# Patient Record
Sex: FEMALE | Race: BLACK OR AFRICAN AMERICAN | NOT HISPANIC OR LATINO | Employment: FULL TIME | URBAN - METROPOLITAN AREA
[De-identification: names, ages, dates, MRNs, and addresses within clinical notes are randomized per-mention and may not be internally consistent; named-entity substitution may affect disease eponyms.]

---

## 2017-01-17 ENCOUNTER — PATIENT MESSAGE (OUTPATIENT)
Dept: PODIATRY | Facility: CLINIC | Age: 37
End: 2017-01-17

## 2017-01-18 ENCOUNTER — TELEPHONE (OUTPATIENT)
Dept: PODIATRY | Facility: CLINIC | Age: 37
End: 2017-01-18

## 2017-01-18 ENCOUNTER — PATIENT MESSAGE (OUTPATIENT)
Dept: PODIATRY | Facility: CLINIC | Age: 37
End: 2017-01-18

## 2017-02-13 ENCOUNTER — PATIENT MESSAGE (OUTPATIENT)
Dept: INTERNAL MEDICINE | Facility: CLINIC | Age: 37
End: 2017-02-13

## 2017-02-13 DIAGNOSIS — Z82.49 FAMILY HISTORY OF CORONARY ARTERY DISEASE: Primary | ICD-10-CM

## 2017-02-15 ENCOUNTER — HOSPITAL ENCOUNTER (OUTPATIENT)
Dept: CARDIOLOGY | Facility: CLINIC | Age: 37
Discharge: HOME | End: 2017-02-15
Payer: COMMERCIAL

## 2017-02-15 ENCOUNTER — PATIENT MESSAGE (OUTPATIENT)
Dept: CARDIOLOGY | Facility: CLINIC | Age: 37
End: 2017-02-15

## 2017-02-15 ENCOUNTER — OFFICE VISIT (OUTPATIENT)
Dept: CARDIOLOGY | Facility: CLINIC | Age: 37
End: 2017-02-15
Payer: COMMERCIAL

## 2017-02-15 VITALS
HEIGHT: 64 IN | BODY MASS INDEX: 33.12 KG/M2 | WEIGHT: 194 LBS | HEART RATE: 79 BPM | DIASTOLIC BLOOD PRESSURE: 60 MMHG | SYSTOLIC BLOOD PRESSURE: 115 MMHG

## 2017-02-15 DIAGNOSIS — Z82.49 FAMILY HISTORY OF CORONARY ARTERY DISEASE: ICD-10-CM

## 2017-02-15 DIAGNOSIS — R07.9 CHEST PAIN AT REST: Primary | ICD-10-CM

## 2017-02-15 DIAGNOSIS — R07.9 CHEST PAIN AT REST: ICD-10-CM

## 2017-02-15 LAB — DIASTOLIC DYSFUNCTION: NO

## 2017-02-15 PROCEDURE — 99204 OFFICE O/P NEW MOD 45 MIN: CPT | Mod: S$GLB,,, | Performed by: INTERNAL MEDICINE

## 2017-02-15 PROCEDURE — 93015 CV STRESS TEST SUPVJ I&R: CPT | Mod: S$GLB,,, | Performed by: INTERNAL MEDICINE

## 2017-02-15 PROCEDURE — 99999 PR PBB SHADOW E&M-EST. PATIENT-LVL III: CPT | Mod: PBBFAC,,, | Performed by: INTERNAL MEDICINE

## 2017-02-15 RX ORDER — VITAMIN E 268 MG
400 CAPSULE ORAL DAILY
COMMUNITY

## 2017-02-15 NOTE — MR AVS SNAPSHOT
[facility]  [address]  [facility] [address]  Phone: [phone]                  Yanna Kwong   2/15/2017 10:30 AM   Office Visit    Description:  Female : 1980   Provider:  Andrea Vega MD   Department:  [facility]           Reason for Visit     Family history of coronary artery disease     Chest Pain           Diagnoses this Visit        Comments    Chest pain at rest    -  Primary     Family history of coronary artery disease                To Do List           Future Appointments        Provider Department Dept Phone    2/15/2017 1:30 PM TREADMILL, NUCLEAR [facility] [phone]      Goals (5 Years of Data)     None      [facility]     [facility] On Call Nurse Care Line -  Assistance  Registered nurses in the [facility] On Call Center provide clinical advisement, health education, appointment booking, and other advisory services.  Call for this free service at [phone].             Medications           Message regarding Medications     Verify the changes and/or additions to your medication regime listed below are the same as discussed with your clinician today.  If any of these changes or additions are incorrect, please notify your healthcare provider.             Verify that the below list of medications is an accurate representation of the medications you are currently taking.  If none reported, the list may be blank. If incorrect, please contact your healthcare provider. Carry this list with you in case of emergency.           Current Medications     ascorbic acid (VITAMIN C) 1000 MG tablet Take 1,000 mg by mouth once daily.    b complex vitamins capsule Take 1 capsule by mouth once daily.    BIOTIN ORAL Take 1 tablet by mouth once daily.    fish oil-omega-3 fatty acids 300-1,000 mg capsule Take 1 g by mouth once daily.    folic acid (FOLVITE) 400 MCG tablet Take 400 mcg by mouth once daily.    ketorolac (TORADOL) 10 mg  "tablet Take 1 tablet (10 mg total) by mouth every 8 (eight) hours. Use only 3 days per month    meloxicam (MOBIC) 15 MG tablet Take 1 tablet (15 mg total) by mouth once daily.    multivitamin (ONE DAILY MULTIVITAMIN) per tablet Take 1 tablet by mouth once daily.    vitamin E 400 UNIT capsule Take 400 Units by mouth once daily.           Clinical Reference Information           Your Vitals Were     BP Pulse Height Weight BMI    115/60 (BP Location: Left arm, Patient Position: Sitting, BP Method: Automatic) 79 5' 4" (1.626 m) 88 kg (194 lb 0.1 oz) 33.3 kg/m2      Blood Pressure          Most Recent Value    Right Arm BP - Sitting  120/59    Left Arm BP - Sitting  115/60    BP  115/60      Allergies as of 2/15/2017     Vicodin [Hydrocodone-acetaminophen]    Erythromycin    Ilosone      Immunizations Administered on Date of Encounter - 2/15/2017     None      Orders Placed During Today's Visit     Future Labs/Procedures Expected by Expires    Cardiac treadmill stress test  As directed 2/15/2018      Instructions    I strongly encourage you to adopt a Plant Based, whole food (unprocessed) dietary strategy.  I recommend that you watch the movie "[employer] Over [employer]" as well as visit www.[employer] for additional resources.  Also, I advise you to read the book "Prevent and Reverse Heart Disease" by Dr. Escamilla for specific instructions and recipes.        Language Assistance Services     ATTENTION: Language assistance services are available, free of charge. Please call [phone].      ATENCIÓN: Si habla keyla, tiene a jacobo disposición servicios gratuitos de asistencia lingüística. Llame al [phone].     CHÚ Ý: N?u b?n nói Ti?ng Vi?t, có các d?ch v? h? tr? ngôn ng? mi?n phí dành cho b?n. G?i s? [phone].         [facility] complies with applicable Federal civil rights laws and does not discriminate on the basis of race, color, national origin, age, disability, or sex.        "

## 2017-02-15 NOTE — PROGRESS NOTES
"Subjective:   Patient ID:  Yanna Kwong is a 36 y.o. female who presents for follow-up of Family history of coronary artery disease and Chest Pain (discomfort x 2 days ago)    HPI:  Pt is here with c/o of an episode of chest discomfort that occurred a few days ago after she woke up.  Has trouble articulating the character of pain however it occurred at rest and ultimately went away on its own.  Recent had her 1st cousin with SCD.  Post mortem "100% blockage of an artery".  Cousin likely had HTN and possibly had mantle radiation for hodgkins many years ago.  Her mother had HPL and CAD.  She has no known risk factors.  No tobacco.    Vitals:    02/15/17 1034   BP: 115/60   BP Location: Left arm   Patient Position: Sitting   BP Method: Automatic   Pulse: 79   Weight: 88 kg (194 lb 0.1 oz)   Height: 5' 4" (1.626 m)     Body mass index is 33.3 kg/(m^2).  CrCl cannot be calculated (Patient has no serum creatinine result on file.).    Lab Results   Component Value Date     07/16/2016    K 4.4 07/16/2016     07/16/2016    CO2 22 (L) 07/16/2016    BUN 8 07/16/2016    CREATININE 0.9 07/16/2016    GLU 93 07/16/2016    AST 21 07/16/2016    ALT 19 07/16/2016    ALBUMIN 3.8 07/16/2016    PROT 7.3 07/16/2016    BILITOT 0.7 07/16/2016    WBC 5.76 07/16/2016    HGB 12.4 07/16/2016    HCT 36.4 (L) 07/16/2016    MCV 87 07/16/2016     (H) 07/16/2016    TSH 0.776 07/16/2016    CHOL 190 07/16/2016    HDL 58 07/16/2016    LDLCALC 123.4 07/16/2016    TRIG 43 07/16/2016       Current Outpatient Prescriptions   Medication Sig    ascorbic acid (VITAMIN C) 1000 MG tablet Take 1,000 mg by mouth once daily.    b complex vitamins capsule Take 1 capsule by mouth once daily.    BIOTIN ORAL Take 1 tablet by mouth once daily.    fish oil-omega-3 fatty acids 300-1,000 mg capsule Take 1 g by mouth once daily.    folic acid (FOLVITE) 400 MCG tablet Take 400 mcg by mouth once daily.    ketorolac (TORADOL) 10 mg tablet " Take 1 tablet (10 mg total) by mouth every 8 (eight) hours. Use only 3 days per month    meloxicam (MOBIC) 15 MG tablet Take 1 tablet (15 mg total) by mouth once daily.    multivitamin (ONE DAILY MULTIVITAMIN) per tablet Take 1 tablet by mouth once daily.    vitamin E 400 UNIT capsule Take 400 Units by mouth once daily.     No current facility-administered medications for this visit.      Review of Systems   Constitution: Negative for malaise/fatigue.   Eyes: Negative for visual disturbance.   Cardiovascular: Positive for chest pain. Negative for claudication, dyspnea on exertion, irregular heartbeat, near-syncope, orthopnea and palpitations.   Respiratory: Negative for shortness of breath and sleep disturbances due to breathing.    Musculoskeletal: Negative for muscle cramps, muscle weakness and myalgias.   Gastrointestinal: Negative for abdominal pain and heartburn.   Genitourinary: Negative for nocturia.   Neurological: Negative for difficulty with concentration, excessive daytime sleepiness, light-headedness, loss of balance, paresthesias and vertigo.       Objective:   Physical Exam   Constitutional: She is oriented to person, place, and time. She appears well-developed and well-nourished.   HENT:   Head: Normocephalic and atraumatic.   Cardiovascular: Normal rate, regular rhythm and normal heart sounds.  Exam reveals no gallop and no friction rub.    No murmur heard.  Pulmonary/Chest: Effort normal and breath sounds normal.   Neurological: She is alert and oriented to person, place, and time.   Psychiatric: She has a normal mood and affect. Her behavior is normal. Judgment and thought content normal.       Assessment:     1. Chest pain at rest    2. Family history of coronary artery disease        Plan:   Pt is low risk for cardiac events.  Recent sudden death of her cousin likely contributing to her sxs.  Plan of ECG treadmill for assurances.    We had a lengthy discussion about nutrition and diet (>30  minutes). I have suggested a Plant Based, whole food, low fat diet. I have recommended literature and media that explain in detail how one can incorporate this type of lifestyle and diet.

## 2017-02-15 NOTE — PATIENT INSTRUCTIONS
"I strongly encourage you to adopt a Plant Based, whole food (unprocessed) dietary strategy.  I recommend that you watch the movie "[employer] [place]" as well as visit [email or web address] for additional resources.  Also, I advise you to read the book "Prevent and Reverse Heart Disease" by Dr. Escamilla for specific instructions and recipes.   "

## 2017-02-15 NOTE — LETTER
February 15, 2017      Lexie Chisholm MD  [address]  [place] [address]           [facility] Cardiology  [address]  [facility] [address]  Phone: [phone]          Patient: Yanna Kwong   MR Number: [id number]   YOB: 1980   Date of Visit: 2/15/2017       Dear Dr. Lexie Chisholm:    Thank you for referring Yanna Kwong to me for evaluation. Attached you will find relevant portions of my assessment and plan of care.    If you have questions, please do not hesitate to call me. I look forward to following Yanna Kwong along with you.    Sincerely,    Andrea Vega MD    Enclosure  CC:  No Recipients    If you would like to receive this communication electronically, please contact [email or web address] or [phone] to request more information on OtherInbox Link access.    For providers and/or their staff who would like to refer a patient to [facility], please contact us through our one-stop-shop provider referral line, [facility], at [phone].    If you feel you have received this communication in error or would no longer like to receive these types of communications, please e-mail [email or web address]

## 2017-06-12 RX ORDER — KETOROLAC TROMETHAMINE 10 MG/1
TABLET, FILM COATED ORAL
Qty: 27 TABLET | Refills: 0 | OUTPATIENT
Start: 2017-06-12

## 2017-06-13 NOTE — TELEPHONE ENCOUNTER
Last routine GYN exam 5/26/2016, pap WNL.  Annual scheduled 7/17/2017.      Please advise on refill request for Toradol to be sent to [facility] in [place]

## 2017-06-13 NOTE — TELEPHONE ENCOUNTER
----- Message from Megha Moe sent at 6/13/2017  2:46 PM CDT -----  Contact: self  Pt needs a refill on ketorolac (TORADOL) 10 mg tablet.  She uses [facility] in [place].    Pt does have an appointment scheduled but needs a refill before the scheduled appointment. Pt is currently cramping and would like to get it filled today if possible.     Pt can be reached at [phone]

## 2017-06-14 RX ORDER — KETOROLAC TROMETHAMINE 10 MG/1
10 TABLET, FILM COATED ORAL EVERY 8 HOURS
Qty: 18 TABLET | Refills: 0 | Status: SHIPPED | OUTPATIENT
Start: 2017-06-14 | End: 2017-09-05 | Stop reason: SDUPTHER

## 2017-06-15 ENCOUNTER — OFFICE VISIT (OUTPATIENT)
Dept: OPTOMETRY | Facility: CLINIC | Age: 37
End: 2017-06-15
Payer: COMMERCIAL

## 2017-06-15 DIAGNOSIS — H52.223 REGULAR ASTIGMATISM OF BOTH EYES: Primary | ICD-10-CM

## 2017-06-15 PROCEDURE — 92014 COMPRE OPH EXAM EST PT 1/>: CPT | Mod: S$GLB,,, | Performed by: OPTOMETRIST

## 2017-06-15 PROCEDURE — 99999 PR PBB SHADOW E&M-EST. PATIENT-LVL I: CPT | Mod: PBBFAC,,, | Performed by: OPTOMETRIST

## 2017-06-15 PROCEDURE — 92015 DETERMINE REFRACTIVE STATE: CPT | Mod: S$GLB,,, | Performed by: OPTOMETRIST

## 2017-06-15 NOTE — PROGRESS NOTES
HPI     Yanna Kwong is a 37 y.o. Female who returns  for continued eye care.   She reports that she has not noticed any changes in her vision since her   last visit. 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 Yanna   reports that her eyes feel dry sometimes she has blurry vision especially   in the afternoon.     (--)blurred vision  (--)Headaches  (--)diplopia  (--)flashes  (+)floaters  (--)pain  (--)Itching  (--)tearing  (--)burning  (--)Dryness  (--) OTC Drops  (--)Photophobia    Last edited by Sergio Soto, OD on 6/15/2017  4:38 PM. (History)        ROS     Positive for: Allergic/Imm (drug)    Negative for: Constitutional, Gastrointestinal, Neurological, Skin,   Genitourinary, Musculoskeletal, HENT, Endocrine, Cardiovascular, Eyes,   Respiratory, Psychiatric, Heme/Lymph    Last edited by Sergio Soto, OD on 6/15/2017  4:38 PM. (History)        Assessment /Plan     For exam results, see Encounter Report.    1. Regular astigmatism of both eyes  - Spec Rx per final Rx below for use prn  Glasses Prescription (4/23/2015)        Sphere Cylinder Axis    Right +0.50 +0.50 124    Left +0.75 +0.25 075    Type:  [employer]    Expiration Date:  4/23/2016        2. Good ocular Health OU      Patient education; RTC in 1 year, sooner prn

## 2017-06-15 NOTE — PROGRESS NOTES
Subjective:      Patient ID: Yanna Kwong is a 37 y.o. female.    Chief Complaint: No chief complaint on file.    HPI    ROS      Objective:        General: Yanna is well-developed, well-nourished, appears stated age, in no acute distress, alert and oriented to time, place and person.     Ortho/SPM Exam            Assessment:       No diagnosis found.       Plan:                Follow-up: No Follow-up on file. If there are any questions prior to this, the patient was instructed to contact the office.

## 2017-06-16 ENCOUNTER — HOSPITAL ENCOUNTER (OUTPATIENT)
Dept: RADIOLOGY | Facility: OTHER | Age: 37
Discharge: HOME | End: 2017-06-16
Attending: PHYSICAL MEDICINE & REHABILITATION
Payer: COMMERCIAL

## 2017-06-16 ENCOUNTER — OFFICE VISIT (OUTPATIENT)
Dept: SPINE | Facility: CLINIC | Age: 37
End: 2017-06-16
Attending: PHYSICAL MEDICINE & REHABILITATION
Payer: COMMERCIAL

## 2017-06-16 VITALS
WEIGHT: 199 LBS | HEART RATE: 75 BPM | HEIGHT: 64 IN | SYSTOLIC BLOOD PRESSURE: 121 MMHG | DIASTOLIC BLOOD PRESSURE: 59 MMHG | BODY MASS INDEX: 33.97 KG/M2

## 2017-06-16 DIAGNOSIS — G57.01 PIRIFORMIS SYNDROME, RIGHT: ICD-10-CM

## 2017-06-16 DIAGNOSIS — M54.41 CHRONIC RIGHT-SIDED LOW BACK PAIN WITH RIGHT-SIDED SCIATICA: Primary | ICD-10-CM

## 2017-06-16 DIAGNOSIS — G89.29 CHRONIC RIGHT-SIDED LOW BACK PAIN WITH RIGHT-SIDED SCIATICA: Primary | ICD-10-CM

## 2017-06-16 DIAGNOSIS — G89.29 CHRONIC RIGHT-SIDED LOW BACK PAIN WITH RIGHT-SIDED SCIATICA: ICD-10-CM

## 2017-06-16 DIAGNOSIS — M54.41 CHRONIC RIGHT-SIDED LOW BACK PAIN WITH RIGHT-SIDED SCIATICA: ICD-10-CM

## 2017-06-16 PROCEDURE — 99204 OFFICE O/P NEW MOD 45 MIN: CPT | Mod: S$GLB,,, | Performed by: PHYSICAL MEDICINE & REHABILITATION

## 2017-06-16 PROCEDURE — 72114 X-RAY EXAM L-S SPINE BENDING: CPT | Mod: 26,,, | Performed by: RADIOLOGY

## 2017-06-16 PROCEDURE — 99999 PR PBB SHADOW E&M-EST. PATIENT-LVL III: CPT | Mod: PBBFAC,,, | Performed by: PHYSICAL MEDICINE & REHABILITATION

## 2017-06-16 PROCEDURE — 72114 X-RAY EXAM L-S SPINE BENDING: CPT | Mod: TC

## 2017-06-16 RX ORDER — METHOCARBAMOL 500 MG/1
500-1000 TABLET, FILM COATED ORAL NIGHTLY PRN
Qty: 60 TABLET | Refills: 2 | Status: SHIPPED | OUTPATIENT
Start: 2017-06-16 | End: 2018-10-01

## 2017-06-16 RX ORDER — NAPROXEN SODIUM 550 MG/1
550 TABLET ORAL 2 TIMES DAILY WITH MEALS
Qty: 60 TABLET | Refills: 2 | Status: SHIPPED | OUTPATIENT
Start: 2017-06-16 | End: 2017-09-05

## 2017-06-16 NOTE — PROGRESS NOTES
Subjective:      Patient ID: Yanna Kwong is a 37 y.o. female.    Chief Complaint: Low-back Pain (right leg)    Ms Kwong is a 36 yo female here for evaluation of low back pain.  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  She completed healthy back with relief of her neck pain completing therapy 1/2015.  She has been having back pain off and on for the past 2 years.  She feels like she has had increased stiffness for 2-3 weeks.  She has been having some sharp pain on the right outer hip and the outer calf.  The pain is worse in the back.  She feels like the pain is worse with transition.  Getting in and out of chair and the bed. The pain is not constant.  The pain is worse with sitting for too long, standing for too long, or lying down.  She cannot lie on the right side.  The pain is achy stiffness, the buttock is a sharp shooting in hip and the calf.  There is some tingling in the calf.  Pain is 1/10 now, worst 6/10 getting up from sitting, best 0/10 lying down.  She has not taken anything for the pain.  She will have popping of the neck that comes and goes    Past Medical History:  No date: Thyroid nodule    Past Surgical History:  No date: BREAST BIOPSY  No date: WISDOM TOOTH EXTRACTION    Review of patient's family history indicates:    Heart disease                  Mother                    Cataracts                      Father                    Macular degeneration           Father                    Hypertension                   Paternal Grandfather      Prostate cancer                Paternal Grandfather      Diabetes                       Paternal Grandmother      Heart disease                  Paternal Grandmother      Heart attack                   Paternal Grandmother      Hypertension                   Maternal Grandmother      Hypertension                   Maternal Grandfather      Heart failure                  Maternal Grandfather      Heart  attack                   Maternal Grandfather      Colon cancer                   Maternal Uncle            Stroke                         Maternal Uncle            Atrial fibrillation            Maternal Aunt             Hypertension                   Maternal Aunt             Heart attack                   Cousin                    Amblyopia                      Neg Hx                    Blindness                      Neg Hx                    Glaucoma                       Neg Hx                    Retinal detachment             Neg Hx                    Strabismus                     Neg Hx                    Thyroid disease                Neg Hx                    Breast cancer                  Neg Hx                    Ovarian cancer                 Neg Hx                      Social History    Marital status: Single              Spouse name:                       Years of education:                 Number of children:               Occupational History  Occupation          Employer            Comment                                   [employer]     Social History Main Topics    Smoking status: Never Smoker                                                                Smokeless tobacco: Never Used                        Alcohol use: No              Drug use: No              Sexual activity: No                      Comment: Never sexually active    Social History Narrative    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    Graduated from [employer]    Degree in graphic Design from [employer]        Current Outpatient Prescriptions:  ascorbic acid (VITAMIN C) 1000 MG tablet, Take 1,000 mg by mouth once daily., Disp: , Rfl:   b complex vitamins capsule, Take 1 capsule by mouth once daily., Disp: , Rfl:   BIOTIN ORAL, Take 1 tablet by mouth once daily., Disp: , Rfl:   fish oil-omega-3 fatty acids 300-1,000 mg capsule, Take 1 g by mouth once daily., Disp: , Rfl:   folic acid (FOLVITE) 400 MCG  tablet, Take 400 mcg by mouth once daily., Disp: , Rfl:   ketorolac (TORADOL) 10 mg tablet, Take 1 tablet (10 mg total) by mouth every 8 (eight) hours. Use only 3 days per month, Disp: 18 tablet, Rfl: 0  multivitamin (ONE DAILY MULTIVITAMIN) per tablet, Take 1 tablet by mouth once daily., Disp: , Rfl:   vitamin E 400 UNIT capsule, Take 400 Units by mouth once daily., Disp: , Rfl:     No current facility-administered medications for this visit.       Review of patient's allergies indicates:   -- Vicodin (hydrocodone-acetaminophen) -- Other (See Comments)    --  tongue   -- Erythromycin -- Itching and Rash   -- Ilosone -- Rash          Review of Systems   Constitution: Negative for weight gain and weight loss.   Cardiovascular: Negative for chest pain.   Respiratory: Negative for shortness of breath.    Musculoskeletal: Positive for back pain, joint pain and neck pain (comes and goes). Negative for joint swelling.   Gastrointestinal: Negative for abdominal pain and bowel incontinence.   Genitourinary: Negative for bladder incontinence.   Neurological: Negative for numbness.         Objective:        General: Yanna is well-developed, well-nourished, appears stated age, in no acute distress, alert and oriented to time, place and person.     General    Vitals reviewed.  Constitutional: She is oriented to person, place, and time. She appears well-developed and well-nourished.   HENT:   Head: Normocephalic and atraumatic.   Pulmonary/Chest: Effort normal.   Neurological: She is alert and oriented to person, place, and time.   Psychiatric: She has a normal mood and affect. Her behavior is normal. Judgment and thought content normal.     General Musculoskeletal Exam   Gait: normal     Right Ankle/Foot Exam     Tests   Heel Walk: able to perform  Tiptoe Walk: able to perform    Left Ankle/Foot Exam     Tests   Heel Walk: able to perform  Tiptoe Walk: able to perform  Back (L-Spine & T-Spine) / Neck (C-Spine) Exam      Tenderness Right paramedian tenderness of the Sacrum.     Back (L-Spine & T-Spine) Range of Motion   Extension: 20   Flexion: 70   Lateral Bend Right: 20   Lateral Bend Left: 20   Rotation Right: 40   Rotation Left: 40     Spinal Sensation   Right Side Sensation  C-Spine Level: normal   L-Spine Level: normal  S-Spine Level: normal  Left Side Sensation  C-Spine Level: normal  L-Spine Level: normal  S-Spine Level: normal    Back (L-Spine & T-Spine) Tests   Right Side Tests  Straight leg raise:      Sitting SLR: > 70 degrees      Left Side Tests  Straight leg raise:     Sitting SLR: > 70 degrees          Other She has no scoliosis .  Spinal Kyphosis:  Absent      Muscle Strength   Right Upper Extremity   Biceps: 5/5/5   Deltoid:  5/5  Triceps:  5/5  Wrist Extension: 5/5/5   Finger Flexors:  5/5  Left Upper Extremity  Biceps: 5/5/5   Deltoid:  5/5  Triceps:  5/5  Wrist Extension: 5/5/5   Finger Flexors:  5/5  Right Lower Extremity   Hip Flexion: 5/5   Quadriceps:  5/5   Anterior tibial:  5/5/5  EHL:  5/5  Left Lower Extremity   Hip Flexion: 5/5   Quadriceps:  5/5   Anterior tibial:  5/5/5   EHL:  5/5    Reflexes     Left Side  Biceps:  2+  Triceps:  2+  Brachioradialis:  2+  Quadriceps:  2+  Achilles:  2+  Left Mcduffie's Sign:  Absent  Babinski Sign:  absent    Right Side   Biceps:  2+  Triceps:  2+  Brachioradialis:  2+  Quadriceps:  2+  Achilles:  2+  Right Mcduffie's Sign:  absent  Babinski Sign:  absent    Vascular Exam     Right Pulses        Carotid:                  2+    Left Pulses        Carotid:                  2+              Assessment:       1. Chronic right-sided low back pain with right-sided sciatica    2. Piriformis syndrome, right           Plan:       Orders Placed This Encounter    X-Ray Lumbar Complete With Flex And Ext    Ambulatory Referral to Physical/Occupational Therapy    naproxen sodium (ANAPROX) 550 MG tablet    methocarbamol (ROBAXIN) 500 MG Tab     More than 50% of the total time  of 45 minutes was spent in counseling on diagnosis and treatment options. We discussed back pain and the nature of back pain.  We discussed that it will likely improve and that it is not one thing that causes the pain but an accumulation of multiple things that we do.  We discussed posture sitting and the importance of trying to sit better.  We discussed the benefits of therapy and exercise and continuing to move.  She has gone through healthy back for her angel and it was very helpful.  She has not continued the strengthening but does do the exercises.  She still does have some neck pain and popping, we discussed posture in work chair  1.  PT for progressive resistance exercises pattern 1 lumbar, she has already done the employee benefit so will regular insurance  2.  X-ray of the lumbar spine  3.  Naproxen 550mg po BID  4.  We discussed continuing to work on posture for the neck and the retractions  5.  Robaxin 500-1000 at night  6.  rtc 3 months      Follow-up: Return in about 3 months (around 9/16/2017). If there are any questions prior to this, the patient was instructed to contact the office.

## 2017-07-13 ENCOUNTER — CLINICAL SUPPORT (OUTPATIENT)
Dept: REHABILITATION | Facility: OTHER | Age: 37
End: 2017-07-13
Attending: PHYSICAL MEDICINE & REHABILITATION
Payer: COMMERCIAL

## 2017-07-13 DIAGNOSIS — M51.36 DDD (DEGENERATIVE DISC DISEASE), LUMBAR: Primary | ICD-10-CM

## 2017-07-13 PROCEDURE — 97110 THERAPEUTIC EXERCISES: CPT

## 2017-07-13 PROCEDURE — 97161 PT EVAL LOW COMPLEX 20 MIN: CPT

## 2017-07-13 NOTE — PROGRESS NOTES
[facility] - PHYSICAL THERAPY EVALUATION       Pt has completed previous Employee benefit, charge as commercial insurance.    Name: Yanna Kwong  Clinic Number: [id number]    Yanna is a 37 y.o. female evaluated on 07/13/2017.   Time In: 4:35  Time out: 6:00    Diagnosis:   Encounter Diagnosis   Name Primary?    DDD (degenerative disc disease), lumbar Yes     Physician: Shirin Krishna, *  Treatment Orders: PT Eval and Treat    Past Medical History:   Diagnosis Date    Thyroid nodule      Current Outpatient Prescriptions   Medication Sig    ascorbic acid (VITAMIN C) 1000 MG tablet Take 1,000 mg by mouth once daily.    b complex vitamins capsule Take 1 capsule by mouth once daily.    BIOTIN ORAL Take 1 tablet by mouth once daily.    fish oil-omega-3 fatty acids 300-1,000 mg capsule Take 1 g by mouth once daily.    folic acid (FOLVITE) 400 MCG tablet Take 400 mcg by mouth once daily.    ketorolac (TORADOL) 10 mg tablet Take 1 tablet (10 mg total) by mouth every 8 (eight) hours. Use only 3 days per month    methocarbamol (ROBAXIN) 500 MG Tab Take 1-2 tablets (500-1,000 mg total) by mouth nightly as needed.    multivitamin (ONE DAILY MULTIVITAMIN) per tablet Take 1 tablet by mouth once daily.    naproxen sodium (ANAPROX) 550 MG tablet Take 1 tablet (550 mg total) by mouth 2 (two) times daily with meals.    vitamin E 400 UNIT capsule Take 400 Units by mouth once daily.     No current facility-administered medications for this visit.      Review of patient's allergies indicates:   Allergen Reactions    Vicodin [hydrocodone-acetaminophen] Other (See Comments)     tongue    Erythromycin Itching and Rash    Ilosone Rash     Precautions: Standard     Visit # authorized: 20  Authorization period: 12/31/17  Plan of care Expiration: Sent 7/16/17      HISTORY   History of Present Illness: Right sided low back pain. Started as low back stiffness and worsened over the last several weeks.  "Describes as shooting stabbing in right glut and right calf. 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 Last flare up has unclear onset.   Has been through [facility] for neck which improved symptoms overall.     Diagnostic Tests: From [facility] Unremarkable.     Pain Scale: Yanna rates pain on a scale of 0-10 to be 9 at worst; 0 currently; 0 at best using VAS.   Pain location: Low back, right glut    Aggravating factors: Prolonged Sitting in chair at work (5-10 minutes), transitioning from sit<>stand, prolonged standing (5-10), prolonged laying position in right side, getting in and out of car, bending forward (such as brushing teeth), twisting   Easing Factors: Walking, motion in general, ice, massage, stretching   Disturbed Sleep: Yes, but has pain with prolonged laying. Usually sleeps in sidelying without pillow. Pt educated on using pillow between legs while sleeping to improve pelvic alignment and potentially reduce morning symptoms      Pattern of pain questions:  1.  Where is your pain the worst? Low back   2.  Is your pain constant or intermittent? Intermittent   3.  Does bending forward make your typical pain worse? Yes  4.  Since the start of your back pain, has there been a change in your bowel or bladder?  No   5.  What can't you do now that you use to be able to do? Not limited, but when irritated leaning down, simly sitting down an rising.     Prior Treatment: No previous treatment for current symptoms  Prior functional status: Independent  DME owned/used: none  Occupation:  Patient coordinator  (Trains staff at main campus, light duty, sitting, occasionally going up and down stairs)    Leisure: Bowl, walking for exercise, Does "[employer]"                      Pts goals:  Reduce pain, learn how to manage your symptom     Red Flag Screening:   Cough  Sneeze  Strain: Yes,   Bladder/ bowel: No  Falls: No  General health: Ok  Night " pain: No  Unexplained weight loss: No     OBJECTIVE     POSTURE  Posture Alignment :slouched posture  Postural examination/scapula alignment: Rounded shoulder, Head forward and Lateral weight shift of hips  Joint integrity: Hypermobile as seen through spring testing  Skin integrity: WNL   Edema: Not significant   Sitting: Poor  Standing: Poor, moderate lordosis   Correction of posture: Added slimline roll, Improved posture in sitting.     SLS >10 sec, Left sided trendelenburg +     MOVEMENT LOSS    ROM Loss   Flexion Minimal loss, fair curve reversal   Extension minimal loss   Side bending Right minimal loss   Side bending Left minimal loss   Rotation Right minimal loss   Rotation Left minimal loss     Hip Flexiblity:   Supine Flexion: Mild limitation bilaterally  Supine Internal rotation: Mild limitation bilaterally, increase hip pain with left IR  Hamstrings 90/90: WFL  Prone Quadriceps: Moderate limitation bilaterally, Bilateral femeral tension postive  Prone Extension: Moderate limitation bilaterally     Lower Extremity Strength  Right LE  Left LE    Hip flexion: 4/5 Hip flexion: 4/5   Hip extension:  4/5 Hip extension: 4/5   Hip abduction: 4/5 Hip abduction: 5/5   Hip adduction:  5/5 Hip adduction:  5/5   Hip Internal rotation   4/5 Hip Internal rotation 4/5   Knee Flexion 5/5 Knee Flexion 5/5   Knee Extension 5/5 Knee Extension 5/5   Ankle dorsiflexion: 5/5 Ankle dorsiflexion: 5/5   Ankle plantarflexion: 5/5 Ankle plantarflexion: 5/5       GAIT:  Assistive Device used: None  Level of Assistance: Independent  Patient displays the following gait deviations: Not significant      Special Tests:   Test Name  Test Result   Prone Instability Test (--)   SI Joint Provocation Test (+), (+) spring, all other negative   Straight Leg Raise (--)   Neural Tension Test (+), slump and prone femoral stress test   Crossed Straight Leg Raise (--)   Walking on toes (--)   Walking on heels  (--)       NEUROLOGICAL SCREENING      Sensory deficit: LE intact to light touch    Reflexes:    Left Right   Patella Tendon 2+ 2+   Achilles Tendon 2+ +   Babinski NT NT   Clonus - -     REPEATED TEST MOVEMENTS:  Repeated Flexion in Standing no effect   Repeated Extension in Standing worse, slightly, improved ROM   Repeated Flexion in lying better    Repeated Extension in lying  worse       STATIC TESTS   Sitting slouched  no effect   Sitting erect better   Standing slouched no effect   Standing erect  better   Lying prone in extension  no effect   Long sitting   no effect       Baseline Isometric Testing on Med X equipment: Testing administered by PT    Baseline IM Testing Results:   Date of testin2017  ROM 48-0 deg   Max Peak Torque 82    Min Peak Torque 34    Flex/Ext Ratio 2.4   % below normative data -69       FOTO: Focus on Therapeutic Outcomes   Category: lumbar   % Impaired: FOTO Not completed at time of eval  Current Score  = CJ = at least 20% but < 40% impaired, limited or restricted  Goal at Discharge Score = CJ = at least 20% but < 40% impaired, limited or restricted    Score interpretation is as follows:     TEST SCORE  Modifier  Impairment Limitation Restriction    0/50  CH  0 % impaired, limited or restricted   1-9/50  CI  @ least 1% but less than 20% impaired, limited or restricted   10-19/50  CJ  @ least 20%<40% impaired, limited or restricted   20-29/50  CK  @ least 40%<60% impaired, limited or restricted   30-39/50  CL  @ least 60% <80% impaired, limited or restricted   40-49/50  CM  @ least 80%<100% impaired limited or restricted   50/50  CN  100% impaired, limited or restricted       Treatment   Time In: 5:20  Time Out: 6:00    PT Evaluation Completed? Yes  Discussed Plan of Care with patient: Yes      Written Home Exercises Provided:   Handouts were given to the patient. Pt demo good understanding of the education provided. Yanna demonstrated good return demonstration of activities.     - Patient received education  regarding proper posture and body mechanics.    - Martin roll tried, recommended, and purchase information was provided.  - Pt also informed of availability to meet with  for recommendations for stress management, diet, and developing positive habit changes.    - Patient received a handout regarding anticipated muscular soreness following the isometric test and strategies for management were reviewed with patient including stretching, using ice and scheduled rest.     HEP as follows     Sidelying clamshells 15x, 3-4x weekly   Flexion in lying 10x, 3x/day  Supine pelvic tilts 5-10 s/h 5x, 2x daily   Z-lie 10-20 min, 2x daily    Pt was instructed in and performed the following:   Cardiovascular exercise and therapeutic exercise to improve posture, lumbar/cervical ROM, strength, and muscular endurance as follows:     Yanna received therapeutic exercises to develop/improve posture, lumbar/cervical ROM, strength and muscular endurance for 30 minutes including the following exercises: med-x lumbar machine testing      Yanna received the following manual therapy techniques: None  Assessment   This is a 37 y.o. female referred to [facility] and presents with a medical diagnosis of   Encounter Diagnosis   Name Primary?    DDD (degenerative disc disease), lumbar Yes    and demonstrates limitations as described below in the problem list. Pt rehab potential is Good. Pt presents with lumbar dysfunction, decreased lumbar strength and ROM compared to norms, decreased LE ROM, decreased LE strength, decreased hip flexibility, poor postural alignment, poor body mechanics, positive neural tension and hypermobility of lumbar spine.         Pain Pattern: 1 PEN       Patient received education on the [facility] program, purpose of the isometric test, progression of back strengthening as well as wellness approach and systemic strengthening.  Details of the program were discussed.  Reviewed that patient  should feel support/pressure from med ex restraints but no pain or discomfort and patient expressed understanding.    Based on the above history and physical examination an active physical therapy program is recommended.  Pt will continue to benefit from skilled outpatient physical therapy to address the deficits listed below in the chart, provide pt/family education and to maximize pt's level of independence in the home and community environment. .     No environmental, cultural, spiritual, developmental or education needs expressed or noted    Medical necessity is demonstrated by the following problem list.    Pt presents with the following impairments:   History  Co-morbidities and personal factors that may impact the plan of care  Examination  Body Structures and Functions, activity limitations and participation restrictions that may impact the plan of care Clinical Presentation   Decision Making/ Complexity Score   Co-morbidities:   Standard        Personal Factors:   lifestyle  character  Body Regions:   back  lower extremities    Body Systems:   gross symmetry  ROM  strength  gross coordinated movement  transitions    Activity limitations:   Learning and applying knowledge  no deficits    General Tasks and Commands  no deficits    Communication  no deficits    Mobility  lifting and carrying objects  walking  driving (bike, car, motorcycle)  Standing    Self care  washing oneself (bathing, drying, washing hands)  caring for body parts (brushing teeth, shaving, grooming)  dressing    Domestic Life  doing house work (cleaning house, washing dishes, laundry)    Interactions/Relationships  no deficits    Life Areas  employment    Community and Social Life  community life  recreation and leisure    Participation Restrictions:   Increased pain with prolonged sitting needed for work. Also limited ability to perform exercise.    stable and uncomplicated   Low         GOALS: Pt is in agreement with the following  "goals.    Short term goals:  6 weeks or 10 visits   1.  Pt will demonstrate increased lumbar ROM by at least 3 degrees from the initial ROM value with improvements noted in functional ROM and ability to perform ADLs  2.  Pt will demonstrate increased maximum isometric torque value by 10% when compared to the initial value resulting in improved ability to perform bending, lifting, and carrying activities safely, confidently.  3.  Patient report a reduction in worst pain score by 1-2 points for improved tolerance during work and recreational activities  4.  Pt able to perform HEP correctly with minimal cueing or supervision for therapist    Long term goals: 13 weeks or 20 visits   1. Pt will demonstrate increased lumbar ROM by at least 6 degrees from initial ROM value, resulting in improved ability to perform functional fwd bending while standing and sitting.   2. Pt will demonstrate increased maximum isometric torque value by 20% when compared to the initial value resulting in improved ability to perform bending, lifting, and carrying activities safely, confidently.  3. Pt to demonstrate ability to independently control and reduce their pain through posture positioning and mechanical movements throughout a typical day.  4.  Patient will demonstrate improved overall function per FOTO Survey to CJ = at least 20% but < 40% impaired, limited or restricted score or less.      Plan   Outpatient physical therapy 2x week for 13 weeks or 20 visits to include the following:   - Patient education  - Therapeutic exercise  - Manual therapy  - Performance testing   - Neuromuscular Re-education  - Therapeutic activity   - Modalities    Pt may be seen by PTA as part of the rehabilitation team.     Therapist: Vidhya Mcconnell, PT  7/13/2017    "I certify the need for these services furnished under this plan of treatment and while under my care."    ____________________________________  Physician/Referring " Practitioner    _______________  Date of Signature

## 2017-07-16 NOTE — PLAN OF CARE
[facility] - PHYSICAL THERAPY EVALUATION     Name: Yanna Kwong  Clinic Number: [id number]    Yanna is a 37 y.o. female evaluated on 07/13/2017.   Time In: 4:35  Time out: 6:00    Diagnosis:   Encounter Diagnosis   Name Primary?    DDD (degenerative disc disease), lumbar Yes     Physician: Shirin Krishna, *  Treatment Orders: PT Eval and Treat    Past Medical History:   Diagnosis Date    Thyroid nodule      Current Outpatient Prescriptions   Medication Sig    ascorbic acid (VITAMIN C) 1000 MG tablet Take 1,000 mg by mouth once daily.    b complex vitamins capsule Take 1 capsule by mouth once daily.    BIOTIN ORAL Take 1 tablet by mouth once daily.    fish oil-omega-3 fatty acids 300-1,000 mg capsule Take 1 g by mouth once daily.    folic acid (FOLVITE) 400 MCG tablet Take 400 mcg by mouth once daily.    ketorolac (TORADOL) 10 mg tablet Take 1 tablet (10 mg total) by mouth every 8 (eight) hours. Use only 3 days per month    methocarbamol (ROBAXIN) 500 MG Tab Take 1-2 tablets (500-1,000 mg total) by mouth nightly as needed.    multivitamin (ONE DAILY MULTIVITAMIN) per tablet Take 1 tablet by mouth once daily.    naproxen sodium (ANAPROX) 550 MG tablet Take 1 tablet (550 mg total) by mouth 2 (two) times daily with meals.    vitamin E 400 UNIT capsule Take 400 Units by mouth once daily.     No current facility-administered medications for this visit.      Review of patient's allergies indicates:   Allergen Reactions    Vicodin [hydrocodone-acetaminophen] Other (See Comments)     tongue    Erythromycin Itching and Rash    Ilosone Rash     Precautions: Standard     Visit # authorized: 20  Authorization period: 12/31/17  Plan of care Expiration: Sent 7/16/17      HISTORY   History of Present Illness: Right sided low back pain. Started as low back stiffness and worsened over the last several weeks. Describes as shooting stabbing in right glut and right calf. 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 Last flare up has unclear onset.   Has been through [facility] for neck which improved symptoms overall.     Diagnostic Tests: From EPIC X-Ray Unremarkable.     Pain Scale: Yanna rates pain on a scale of 0-10 to be 9 at worst; 0 currently; 0 at best using VAS.   Pain location: Low back, right glut    Aggravating factors: Prolonged Sitting in chair at work (5-10 minutes), transitioning from sit<>stand, prolonged standing (5-10), prolonged laying position in right side, getting in and out of car, bending forward (such as brushing teeth), twisting   Easing Factors: Walking, motion in general, ice, massage, stretching   Disturbed Sleep: Yes, but has pain with prolonged laying. Usually sleeps in sidelying without pillow. Pt educated on using pillow between legs while sleeping to improve pelvic alignment and potentially reduce morning symptoms      Pattern of pain questions:  1.  Where is your pain the worst? Low back   2.  Is your pain constant or intermittent? Intermittent   3.  Does bending forward make your typical pain worse? Yes  4.  Since the start of your back pain, has there been a change in your bowel or bladder?  No   5.  What can't you do now that you use to be able to do? Not limited, but when irritated leaning down, simly sitting down an rising.     Prior Treatment: No previous treatment for current symptoms  Prior functional status: Independent  DME owned/used: none  Occupation:  Patient coordinator  (Trains staff at main campus, light duty, sitting, occasionally going up and down stairs)    Leisure: Bowl, walking for exercise, Does "[employer]"                      Pts goals:  Reduce pain, learn how to manage your symptom     Red Flag Screening:   Cough  Sneeze  Strain: Yes,   Bladder/ bowel: No  Falls: No  General health: Ok  Night pain: No  Unexplained weight loss: No     OBJECTIVE     POSTURE  Posture Alignment " :slouched posture  Postural examination/scapula alignment: Rounded shoulder, Head forward and Lateral weight shift of hips  Joint integrity: Hypermobile as seen through spring testing  Skin integrity: WNL   Edema: Not significant   Sitting: Poor  Standing: Poor, moderate lordosis   Correction of posture: Added slimline roll, Improved posture in sitting.     SLS >10 sec, Left sided trendelenburg +     MOVEMENT LOSS    ROM Loss   Flexion Minimal loss, fair curve reversal   Extension minimal loss   Side bending Right minimal loss   Side bending Left minimal loss   Rotation Right minimal loss   Rotation Left minimal loss     Hip Flexiblity:   Supine Flexion: Mild limitation bilaterally  Supine Internal rotation: Mild limitation bilaterally, increase hip pain with left IR  Hamstrings 90/90: WFL  Prone Quadriceps: Moderate limitation bilaterally, Bilateral femeral tension postive  Prone Extension: Moderate limitation bilaterally     Lower Extremity Strength  Right LE  Left LE    Hip flexion: 4/5 Hip flexion: 4/5   Hip extension:  4/5 Hip extension: 4/5   Hip abduction: 4/5 Hip abduction: 5/5   Hip adduction:  5/5 Hip adduction:  5/5   Hip Internal rotation   4/5 Hip Internal rotation 4/5   Knee Flexion 5/5 Knee Flexion 5/5   Knee Extension 5/5 Knee Extension 5/5   Ankle dorsiflexion: 5/5 Ankle dorsiflexion: 5/5   Ankle plantarflexion: 5/5 Ankle plantarflexion: 5/5       GAIT:  Assistive Device used: None  Level of Assistance: Independent  Patient displays the following gait deviations: Not significant      Special Tests:   Test Name  Test Result   Prone Instability Test (--)   SI Joint Provocation Test (+), (+) spring, all other negative   Straight Leg Raise (--)   Neural Tension Test (+), slump and prone femoral stress test   Crossed Straight Leg Raise (--)   Walking on toes (--)   Walking on heels  (--)       NEUROLOGICAL SCREENING     Sensory deficit: LE intact to light touch    Reflexes:    Left Right   Patella Tendon  2+ 2+   Achilles Tendon 2+ +   Babinski NT NT   Clonus - -     REPEATED TEST MOVEMENTS:  Repeated Flexion in Standing no effect   Repeated Extension in Standing worse, slightly, improved ROM   Repeated Flexion in lying better    Repeated Extension in lying  worse       STATIC TESTS   Sitting slouched  no effect   Sitting erect better   Standing slouched no effect   Standing erect  better   Lying prone in extension  no effect   Long sitting   no effect       Baseline Isometric Testing on Med X equipment: Testing administered by PT    Baseline IM Testing Results:   Date of testin2017  ROM 48-0 deg   Max Peak Torque 82    Min Peak Torque 34    Flex/Ext Ratio 2.4   % below normative data -69       FOTO: Focus on Therapeutic Outcomes   Category: lumbar   % Impaired: FOTO Not completed at time of eval  Current Score  = CJ = at least 20% but < 40% impaired, limited or restricted  Goal at Discharge Score = CJ = at least 20% but < 40% impaired, limited or restricted    Score interpretation is as follows:     TEST SCORE  Modifier  Impairment Limitation Restriction    0/50  CH  0 % impaired, limited or restricted   1-9/50  CI  @ least 1% but less than 20% impaired, limited or restricted   10-19/50  CJ  @ least 20%<40% impaired, limited or restricted   20-29/50  CK  @ least 40%<60% impaired, limited or restricted   30-39/50  CL  @ least 60% <80% impaired, limited or restricted   40-49/50  CM  @ least 80%<100% impaired limited or restricted   50/50  CN  100% impaired, limited or restricted       Treatment   Time In: 5:20  Time Out: 6:00    PT Evaluation Completed? Yes  Discussed Plan of Care with patient: Yes      Written Home Exercises Provided:   Handouts were given to the patient. Pt demo good understanding of the education provided. Yanna demonstrated good return demonstration of activities.     - Patient received education regarding proper posture and body mechanics.    - Martin fontenot tried, recommended, and  purchase information was provided.  - Pt also informed of availability to meet with  for recommendations for stress management, diet, and developing positive habit changes.    - Patient received a handout regarding anticipated muscular soreness following the isometric test and strategies for management were reviewed with patient including stretching, using ice and scheduled rest.     HEP as follows     Sidelying clamshells 15x, 3-4x weekly   Flexion in lying 10x, 3x/day  Supine pelvic tilts 5-10 s/h 5x, 2x daily   Z-lie 10-20 min, 2x daily    Pt was instructed in and performed the following:   Cardiovascular exercise and therapeutic exercise to improve posture, lumbar/cervical ROM, strength, and muscular endurance as follows:     Yanna received therapeutic exercises to develop/improve posture, lumbar/cervical ROM, strength and muscular endurance for 30 minutes including the following exercises: med-x lumbar machine testing      Yanna received the following manual therapy techniques: None  Assessment   This is a 37 y.o. female referred to [facility] and presents with a medical diagnosis of   Encounter Diagnosis   Name Primary?    DDD (degenerative disc disease), lumbar Yes    and demonstrates limitations as described below in the problem list. Pt rehab potential is Good. Pt presents with lumbar dysfunction, decreased lumbar strength and ROM compared to norms, decreased LE ROM, decreased LE strength, decreased hip flexibility, poor postural alignment, poor body mechanics, positive neural tension and hypermobility of lumbar spine.         Pain Pattern: 1 PEN       Patient received education on the [facility] program, purpose of the isometric test, progression of back strengthening as well as wellness approach and systemic strengthening.  Details of the program were discussed.  Reviewed that patient should feel support/pressure from med ex restraints but no pain or discomfort and patient  expressed understanding.    Based on the above history and physical examination an active physical therapy program is recommended.  Pt will continue to benefit from skilled outpatient physical therapy to address the deficits listed below in the chart, provide pt/family education and to maximize pt's level of independence in the home and community environment. .     No environmental, cultural, spiritual, developmental or education needs expressed or noted    Medical necessity is demonstrated by the following problem list.    Pt presents with the following impairments:   History  Co-morbidities and personal factors that may impact the plan of care  Examination  Body Structures and Functions, activity limitations and participation restrictions that may impact the plan of care Clinical Presentation   Decision Making/ Complexity Score   Co-morbidities:   Standard        Personal Factors:   lifestyle  character  Body Regions:   back  lower extremities    Body Systems:   gross symmetry  ROM  strength  gross coordinated movement  transitions    Activity limitations:   Learning and applying knowledge  no deficits    General Tasks and Commands  no deficits    Communication  no deficits    Mobility  lifting and carrying objects  walking  driving (bike, car, motorcycle)  Standing    Self care  washing oneself (bathing, drying, washing hands)  caring for body parts (brushing teeth, shaving, grooming)  dressing    Domestic Life  doing house work (cleaning house, washing dishes, laundry)    Interactions/Relationships  no deficits    Life Areas  employment    Community and Social Life  community life  recreation and leisure    Participation Restrictions:   Increased pain with prolonged sitting needed for work. Also limited ability to perform exercise.    stable and uncomplicated   Low         GOALS: Pt is in agreement with the following goals.    Short term goals:  6 weeks or 10 visits   1.  Pt will demonstrate increased lumbar ROM  "by at least 3 degrees from the initial ROM value with improvements noted in functional ROM and ability to perform ADLs  2.  Pt will demonstrate increased maximum isometric torque value by 10% when compared to the initial value resulting in improved ability to perform bending, lifting, and carrying activities safely, confidently.  3.  Patient report a reduction in worst pain score by 1-2 points for improved tolerance during work and recreational activities  4.  Pt able to perform HEP correctly with minimal cueing or supervision for therapist    Long term goals: 13 weeks or 20 visits   1. Pt will demonstrate increased lumbar ROM by at least 6 degrees from initial ROM value, resulting in improved ability to perform functional fwd bending while standing and sitting.   2. Pt will demonstrate increased maximum isometric torque value by 20% when compared to the initial value resulting in improved ability to perform bending, lifting, and carrying activities safely, confidently.  3. Pt to demonstrate ability to independently control and reduce their pain through posture positioning and mechanical movements throughout a typical day.  4.  Patient will demonstrate improved overall function per FOTO Survey to CJ = at least 20% but < 40% impaired, limited or restricted score or less.      Plan   Outpatient physical therapy 2x week for 13 weeks or 20 visits to include the following:   - Patient education  - Therapeutic exercise  - Manual therapy  - Performance testing   - Neuromuscular Re-education  - Therapeutic activity   - Modalities    Pt may be seen by PTA as part of the rehabilitation team.     Therapist: Vidhya Mcconnell, PT  7/13/2017    "I certify the need for these services furnished under this plan of treatment and while under my care."    ____________________________________  Physician/Referring Practitioner    _______________  Date of Signature                "

## 2017-07-17 ENCOUNTER — OFFICE VISIT (OUTPATIENT)
Dept: OBSTETRICS AND GYNECOLOGY | Facility: CLINIC | Age: 37
End: 2017-07-17
Payer: COMMERCIAL

## 2017-07-17 ENCOUNTER — OFFICE VISIT (OUTPATIENT)
Dept: PODIATRY | Facility: CLINIC | Age: 37
End: 2017-07-17
Payer: COMMERCIAL

## 2017-07-17 ENCOUNTER — OFFICE VISIT (OUTPATIENT)
Dept: ENDOCRINOLOGY | Facility: CLINIC | Age: 37
End: 2017-07-17
Payer: COMMERCIAL

## 2017-07-17 ENCOUNTER — LAB VISIT (OUTPATIENT)
Dept: LAB | Facility: HOSPITAL | Age: 37
End: 2017-07-17
Payer: COMMERCIAL

## 2017-07-17 VITALS
DIASTOLIC BLOOD PRESSURE: 62 MMHG | SYSTOLIC BLOOD PRESSURE: 100 MMHG | WEIGHT: 197.56 LBS | BODY MASS INDEX: 35 KG/M2 | HEIGHT: 63 IN

## 2017-07-17 VITALS
SYSTOLIC BLOOD PRESSURE: 100 MMHG | DIASTOLIC BLOOD PRESSURE: 65 MMHG | HEIGHT: 63 IN | BODY MASS INDEX: 34.55 KG/M2 | HEART RATE: 62 BPM | WEIGHT: 195 LBS

## 2017-07-17 VITALS
DIASTOLIC BLOOD PRESSURE: 79 MMHG | HEIGHT: 63 IN | BODY MASS INDEX: 35 KG/M2 | RESPIRATION RATE: 16 BRPM | WEIGHT: 197.56 LBS | HEART RATE: 69 BPM | SYSTOLIC BLOOD PRESSURE: 112 MMHG

## 2017-07-17 DIAGNOSIS — M79.672 FOOT PAIN, LEFT: ICD-10-CM

## 2017-07-17 DIAGNOSIS — M77.52 CAPSULITIS OF TOE OF LEFT FOOT: Primary | ICD-10-CM

## 2017-07-17 DIAGNOSIS — E66.9 OBESITY (BMI 30-39.9): ICD-10-CM

## 2017-07-17 DIAGNOSIS — Z12.4 SCREENING FOR MALIGNANT NEOPLASM OF THE CERVIX: ICD-10-CM

## 2017-07-17 DIAGNOSIS — Z01.419 ROUTINE GYNECOLOGICAL EXAMINATION: Primary | ICD-10-CM

## 2017-07-17 DIAGNOSIS — E04.1 THYROID NODULE: ICD-10-CM

## 2017-07-17 LAB
THYROPEROXIDASE IGG SERPL-ACNC: <6 IU/ML
TSH SERPL DL<=0.005 MIU/L-ACNC: 1.51 UIU/ML

## 2017-07-17 PROCEDURE — 83036 HEMOGLOBIN GLYCOSYLATED A1C: CPT

## 2017-07-17 PROCEDURE — 99395 PREV VISIT EST AGE 18-39: CPT | Mod: S$GLB,,, | Performed by: OBSTETRICS & GYNECOLOGY

## 2017-07-17 PROCEDURE — 36415 COLL VENOUS BLD VENIPUNCTURE: CPT

## 2017-07-17 PROCEDURE — 99213 OFFICE O/P EST LOW 20 MIN: CPT | Mod: S$GLB,,, | Performed by: PODIATRIST

## 2017-07-17 PROCEDURE — 99999 PR PBB SHADOW E&M-EST. PATIENT-LVL II: CPT | Mod: PBBFAC,,, | Performed by: OBSTETRICS & GYNECOLOGY

## 2017-07-17 PROCEDURE — 99204 OFFICE O/P NEW MOD 45 MIN: CPT | Mod: S$GLB,,, | Performed by: INTERNAL MEDICINE

## 2017-07-17 PROCEDURE — 99999 PR PBB SHADOW E&M-EST. PATIENT-LVL III: CPT | Mod: PBBFAC,,, | Performed by: PODIATRIST

## 2017-07-17 PROCEDURE — 84443 ASSAY THYROID STIM HORMONE: CPT

## 2017-07-17 PROCEDURE — 88175 CYTOPATH C/V AUTO FLUID REDO: CPT

## 2017-07-17 PROCEDURE — 86376 MICROSOMAL ANTIBODY EACH: CPT

## 2017-07-17 PROCEDURE — 99999 PR PBB SHADOW E&M-EST. PATIENT-LVL III: CPT | Mod: PBBFAC,,, | Performed by: INTERNAL MEDICINE

## 2017-07-17 RX ORDER — METHYLPREDNISOLONE 4 MG/1
4 TABLET ORAL DAILY
Qty: 1 TABLET | Refills: 0 | Status: SHIPPED | OUTPATIENT
Start: 2017-07-17 | End: 2017-08-10

## 2017-07-17 NOTE — PROGRESS NOTES
Chief Complaint: Thyroid Nodule      HPI:  Yanna Kwong is 37 y.o. female here today for evaluation of MNG     The patient initially was found to have thyroid nodules on thyroid ultrasound in 2014    Repeat study in 2015 showed a 4mm nodule on the right and a 5mm nodule on the left     No recent study available for comparison     She endorses occasional dysphagia   Denies shortness of breath in the recumbent position   Denies voice changes or hoarseness of voice     The patient has noticed an increase in neck size   She endorses thyroid tenderness     Denies neck pain or pressure     She denies family history of thyroid disease or thyroid cancer     Denies personal history of radiation exposure to her head, face or neck     Denies amenorrhea   States LMP : 7/07/2017   Not currently taking OCP's     She endorses fatigue, heat intolerance, hair loss and anxiety     Denies dry skin, constipation , palpitations or tremors     Review of Systems   Constitutional: Positive for fatigue.   HENT: Positive for trouble swallowing (occasional ). Negative for sore throat and voice change.    Eyes: Negative for visual disturbance.   Respiratory: Negative for shortness of breath.    Cardiovascular: Negative for chest pain and palpitations.   Gastrointestinal: Negative for abdominal pain, constipation, diarrhea, nausea and vomiting.   Endocrine: Positive for heat intolerance.   Genitourinary: Negative for menstrual problem.   Musculoskeletal: Negative for myalgias.   Skin: Negative for rash.        Hair loss   Allergic/Immunologic: Negative for immunocompromised state.   Neurological: Negative for tremors and headaches.   Hematological: Does not bruise/bleed easily.   Psychiatric/Behavioral: Negative for confusion. The patient is nervous/anxious.        Physical Exam   Constitutional: She is oriented to person, place, and time. She appears well-developed. No distress.   HENT:   Right Ear: External ear normal.   Left Ear:  External ear normal.   Nose: Nose normal.   Hearing normal  Dentition good    Neck: Normal range of motion. Neck supple. No tracheal deviation present. Thyromegaly present.   Cardiovascular: Normal rate and regular rhythm.    No murmur heard.  Pulmonary/Chest: Effort normal and breath sounds normal.   Abdominal: Soft. There is no tenderness. No hernia.   Musculoskeletal: She exhibits no edema or deformity.   Gait normal  No clubbing or cyanosis noted   Lymphadenopathy:     She has no cervical adenopathy.   Neurological: She is alert and oriented to person, place, and time. She displays normal reflexes. No cranial nerve deficit.   Skin: Skin is warm and dry. No rash noted.   No nodules       Psychiatric: She has a normal mood and affect. Judgment normal.   Nursing note and vitals reviewed.      Labs:  Lab Results   Component Value Date    TSH 0.776 07/16/2016        Assessment and Plan:  1. Thyroid nodule  - reviewed management options    - recommend repeat thyroid ultrasound study   - discussed indications for FNA: size, suspicious characteristics, compressive symptoms   - will determine if FNA biopsy is warranted, based on Thyroid USS results   -     TSH; Future; Expected date: 07/17/2017  -     US Soft Tissue Head Neck Thyroid; Future; Expected date: 07/17/2017  -     Thyroid peroxidase antibody; Future; Expected date: 07/17/2017    2. Obesity (BMI 30-39.9)  - encouraged dietary and lifestyle modifications   - recommend periodic A1c   - encouraged to resume exercise as tolerated   -     Hemoglobin A1c; Future; Expected date: 07/17/2017

## 2017-07-17 NOTE — PROGRESS NOTES
"OBSTETRIC HISTORY:   OB History      Para Term  AB Living    0              SAB TAB Ectopic Multiple Live Births                      COMPREHENSIVE GYN HISTORY:  PAP History: Denies abnormal Paps.  Infection History: Denies STDs. Denies PID.  Benign History: Denies uterine fibroids. Denies ovarian cysts. Denies endometriosis.   Cancer History: Denies cervical cancer. Denies uterine cancer or hyperplasia. Denies ovarian cancer. Denies vulvar cancer or pre-cancer. Denies vaginal cancer or pre-cancer. Denies breast cancer. Denies colon cancer.  Sexual Activity History:   reports that she does not engage in sexual activity.   Menstrual History: Every 21-23 days, flows for 5 days. Moderate to heavy flow.  Dysmenorrhea History: Reports severe dysmenorrhea. (takes Excedrin)  Contraception: None         HPI:   37 y.o.  Patient's last menstrual period was 2017.   Patient is  here for her annual gynecologic exam.  She has no complaints. She denies bladder, breast and bowel complaints.    ROS:  GENERAL: Denies weight gain or weight loss. Feeling well overall.   SKIN: Denies rash or lesions.   HEAD: Denies headache.   NODES: Denies enlarged lymph nodes.   CHEST: Denies shortness of breath.   ABDOMEN: No abdominal pain, constipation, diarrhea, nausea, vomiting or rectal bleeding.   URINARY: No frequency, dysuria, hematuria, or burning on urination.  REPRODUCTIVE: See HPI.   BREASTS: The patient denies pain, lumps, or nipple discharge.   HEMATOLOGIC: No easy bruisability.   MUSCULOSKELETAL: Denies joint pain or back pain.   NEUROLOGIC: Denies weakness.   PSYCHIATRIC: Denies depression, anxiety or mood swings.    PE:   /62   Ht 5' 3" (1.6 m)   Wt 89.6 kg (197 lb 8.5 oz)   LMP 2017   BMI 34.99 kg/m²   APPEARANCE: Well nourished, well developed, in no acute distress.  NECK: Neck symmetric without  thyromegaly.  NODES: No inguinal, cervical lymph node enlargement.  CHEST: Lungs clear to " auscultation.  HEART: Regular rate and rhythm, no murmurs, rubs or gallops.  ABDOMEN: Soft. No tenderness or masses. No hernias.  BREASTS: Symmetrical, no skin changes or visible lesions. No palpable masses, nipple discharge or adenopathy bilaterally.  PELVIC:   VULVA: No lesions. Normal female genitalia.  URETHRAL MEATUS: Normal size and location, no lesions, no prolapse.  URETHRA: No masses, tenderness, prolapse or scarring.  VAGINA: Moist and well rugated, no discharge, no significant cystocele or rectocele.  CERVIX: No lesions and discharge.  UTERUS: Normal size, regular shape, mobile, non-tender, bladder base nontender.  ADNEXA: No masses or tenderness.    PROCEDURES:  Pap smear  - brush only with speculum    Assessment:  Normal Gynecologic Exam    Plan:  Mammogram and Colonoscopy if indicated by current recommendations.  Return to clinic in one year or for any problems or complaints.    Counseling:  Patient was counseled today on A.C.S. Pap guidelines and recommendations for yearly pelvic exams and monthly self breast exams; to see her PCP for other health maintenance. Regular exercise and healthy diet.

## 2017-07-17 NOTE — PROGRESS NOTES
"Subjective:      Patient ID: Yanna Kwong is a 37 y.o. female.    Chief Complaint: Foot Pain (ball of rthe foot of the left foot)    Yanna is a 37 y.o. female who presents to the podiatry clinic  with complaint of  left foot pain. Onset of the symptoms was several weeks ago. Precipitating event: none known. Current symptoms include: ability to bear weight, but with some pain. Aggravating factors: any weight bearing. Symptoms have waxed and waned but are better overall. Patient has had no prior foot problems. Evaluation to date: none. Treatment to date: prescription NSAIDS which are somewhat effective.       Review of Systems   Constitution: Negative for chills, decreased appetite and fever.   Cardiovascular: Negative for leg swelling.   Musculoskeletal: Negative for arthritis, joint pain, joint swelling and myalgias.   Gastrointestinal: Negative for nausea and vomiting.   Neurological: Negative for loss of balance, numbness and paresthesias.           Objective:       Vitals:    07/17/17 1342   BP: 100/65   Pulse: 62   Weight: 88.5 kg (195 lb)   Height: 5' 3" (1.6 m)   PainSc:   2        Physical Exam   Constitutional: She is oriented to person, place, and time. She appears well-developed and well-nourished.   Cardiovascular:   Pulses:       Dorsalis pedis pulses are 2+ on the right side, and 2+ on the left side.        Posterior tibial pulses are 2+ on the right side, and 2+ on the left side.   Musculoskeletal: She exhibits no edema or tenderness.        Right ankle: Normal.        Left ankle: Normal.        Right foot: There is no swelling, no crepitus and no deformity.        Left foot: There is no swelling, no crepitus and no deformity.   Adequate joint range of motion without pain, limitation, nor crepitation Bilateral feet and ankle joints. Muscle strength is 5/5 in all groups bilaterally.      Tenderness on palpation of the L 2-3 metatarsal phalangeal joint. Mild tenderness with palpation of the " adjacent intermetatarsal space with a negative diane's click      Lymphadenopathy:   No palpable lymph nodes   Neurological: She is alert and oriented to person, place, and time. She has normal strength.   Skin: Skin is warm, dry and intact. No rash noted. No erythema. Nails show no clubbing.   Psychiatric: She has a normal mood and affect. Her behavior is normal.             Assessment:       Encounter Diagnoses   Name Primary?    Capsulitis of toe of left foot Yes    Foot pain, left          Plan:       Yanna was seen today for foot pain.    Diagnoses and all orders for this visit:    Capsulitis of toe of left foot    Foot pain, left    Other orders  -     methylPREDNISolone (MEDROL DOSEPACK) 4 mg tablet; Take 1 tablet (4 mg total) by mouth once daily. Use as instructed on dose pack      I counseled the patient on her conditions, their implications and medical management.    Patient instructed on adequate icing techniques. Patient should ice the affected area at least once per day x 10 minutes for 10 days . I advised the  patient that extra icing would also be beneficial to ensure adequate anti inflammatory effect     Medrol dose [place] prescribed, advised patient to take meds as directed. Patient should complete medication. If problems occur notify the clinic

## 2017-07-18 LAB
ESTIMATED AVG GLUCOSE: 100 MG/DL
HBA1C MFR BLD HPLC: 5.1 %

## 2017-07-20 ENCOUNTER — HOSPITAL ENCOUNTER (OUTPATIENT)
Dept: ENDOCRINOLOGY | Facility: CLINIC | Age: 37
Discharge: HOME | End: 2017-07-20
Attending: INTERNAL MEDICINE
Payer: COMMERCIAL

## 2017-07-20 DIAGNOSIS — E04.1 THYROID NODULE: ICD-10-CM

## 2017-07-20 PROCEDURE — 76536 US EXAM OF HEAD AND NECK: CPT | Mod: S$GLB,,, | Performed by: INTERNAL MEDICINE

## 2017-08-10 ENCOUNTER — PATIENT MESSAGE (OUTPATIENT)
Dept: INTERNAL MEDICINE | Facility: CLINIC | Age: 37
End: 2017-08-10

## 2017-08-10 ENCOUNTER — LAB VISIT (OUTPATIENT)
Dept: LAB | Facility: HOSPITAL | Age: 37
End: 2017-08-10
Attending: INTERNAL MEDICINE
Payer: COMMERCIAL

## 2017-08-10 ENCOUNTER — OFFICE VISIT (OUTPATIENT)
Dept: INTERNAL MEDICINE | Facility: CLINIC | Age: 37
End: 2017-08-10
Payer: COMMERCIAL

## 2017-08-10 VITALS
BODY MASS INDEX: 33.27 KG/M2 | SYSTOLIC BLOOD PRESSURE: 104 MMHG | WEIGHT: 194.88 LBS | HEART RATE: 78 BPM | DIASTOLIC BLOOD PRESSURE: 78 MMHG | HEIGHT: 64 IN

## 2017-08-10 DIAGNOSIS — E66.9 OBESITY, UNSPECIFIED OBESITY SEVERITY, UNSPECIFIED OBESITY TYPE: ICD-10-CM

## 2017-08-10 DIAGNOSIS — Z00.00 ROUTINE GENERAL MEDICAL EXAMINATION AT A HEALTH CARE FACILITY: Primary | ICD-10-CM

## 2017-08-10 DIAGNOSIS — Z00.00 ROUTINE GENERAL MEDICAL EXAMINATION AT A HEALTH CARE FACILITY: ICD-10-CM

## 2017-08-10 LAB
ALBUMIN SERPL BCP-MCNC: 3.7 G/DL
ALP SERPL-CCNC: 45 U/L
ALT SERPL W/O P-5'-P-CCNC: 17 U/L
ANION GAP SERPL CALC-SCNC: 8 MMOL/L
AST SERPL-CCNC: 19 U/L
BASOPHILS # BLD AUTO: 0.04 K/UL
BASOPHILS NFR BLD: 0.7 %
BILIRUB SERPL-MCNC: 0.6 MG/DL
BUN SERPL-MCNC: 10 MG/DL
CALCIUM SERPL-MCNC: 9.2 MG/DL
CHLORIDE SERPL-SCNC: 106 MMOL/L
CHOLEST/HDLC SERPL: 3.3 {RATIO}
CO2 SERPL-SCNC: 26 MMOL/L
CREAT SERPL-MCNC: 0.9 MG/DL
DIFFERENTIAL METHOD: ABNORMAL
EOSINOPHIL # BLD AUTO: 0 K/UL
EOSINOPHIL NFR BLD: 0.7 %
ERYTHROCYTE [DISTWIDTH] IN BLOOD BY AUTOMATED COUNT: 13.6 %
EST. GFR  (AFRICAN AMERICAN): >60 ML/MIN/1.73 M^2
EST. GFR  (NON AFRICAN AMERICAN): >60 ML/MIN/1.73 M^2
GLUCOSE SERPL-MCNC: 87 MG/DL
HCT VFR BLD AUTO: 35.8 %
HDL/CHOLESTEROL RATIO: 29.9 %
HDLC SERPL-MCNC: 174 MG/DL
HDLC SERPL-MCNC: 52 MG/DL
HGB BLD-MCNC: 12.3 G/DL
LDLC SERPL CALC-MCNC: 99 MG/DL
LYMPHOCYTES # BLD AUTO: 1.9 K/UL
LYMPHOCYTES NFR BLD: 32.6 %
MCH RBC QN AUTO: 29.5 PG
MCHC RBC AUTO-ENTMCNC: 34.4 G/DL
MCV RBC AUTO: 86 FL
MONOCYTES # BLD AUTO: 0.3 K/UL
MONOCYTES NFR BLD: 4.5 %
NEUTROPHILS # BLD AUTO: 3.7 K/UL
NEUTROPHILS NFR BLD: 61.5 %
NONHDLC SERPL-MCNC: 122 MG/DL
PLATELET # BLD AUTO: 337 K/UL
PMV BLD AUTO: 10.3 FL
POTASSIUM SERPL-SCNC: 4.3 MMOL/L
PROT SERPL-MCNC: 7.3 G/DL
RBC # BLD AUTO: 4.17 M/UL
SODIUM SERPL-SCNC: 140 MMOL/L
TRIGL SERPL-MCNC: 115 MG/DL
WBC # BLD AUTO: 5.95 K/UL

## 2017-08-10 PROCEDURE — 80053 COMPREHEN METABOLIC PANEL: CPT

## 2017-08-10 PROCEDURE — 85025 COMPLETE CBC W/AUTO DIFF WBC: CPT

## 2017-08-10 PROCEDURE — 99999 PR PBB SHADOW E&M-EST. PATIENT-LVL III: CPT | Mod: PBBFAC,,, | Performed by: INTERNAL MEDICINE

## 2017-08-10 PROCEDURE — 80061 LIPID PANEL: CPT

## 2017-08-10 PROCEDURE — 99395 PREV VISIT EST AGE 18-39: CPT | Mod: S$GLB,,, | Performed by: INTERNAL MEDICINE

## 2017-08-10 PROCEDURE — 36415 COLL VENOUS BLD VENIPUNCTURE: CPT

## 2017-08-10 NOTE — PROGRESS NOTES
"Chief Complaint: Annual exam     HPI: this is a 37 year old woman who presents for an annual exam. She generally feels well.     She had a benign breast biopsy 6/2016 on the right the breast. Repeat MMG 12/16 fine    Sinus congestion comes and goes. NO fever, chills, ear pain, cough, shortness of breath, nausea, vomiting, constipation, diarreha. She takes Fioricet as needed for severe headache which helps.     SHe is in the healthy back program and her back is better.              Past Medical History     Diagnosis   Date        Thyroid nodule                  Past Surgical History     Procedure   Date        [place] tooth extraction          Meds and allergies: updated on EPIC     Social History and Family History:updated on epic     Review of Systems:  General: No fever, chills, night sweats, weight loss, fatigue  HEENT: No visual changes, diplopia, hearing loss, sinus congestion, sore throat, post nasal drip  Resp: No cough, shortness of breath, dyspnea on exertion  Cardiovascular: No chest pain, palpitations, orthopnea, PND, edema  GI: No nausea, vomiting, constipation, diarrhea, melana, bloody stools, heartburn  : No dysuria, hematuria, incontinence, incomplete void, dribbling, nocturia.  Musculoskeletal: No joint pain, muscle pain  Neuro: No headaches, seizures, numbness  Endocrine: No polydipsia, polyuria, hot or cold intolerance.  Heme: No anemia, easy bruising.  Skin: No rashes, hairloss.  Psych: No anxiety, depression, or insomnia  Breasts: No abnormalities  GYN: Menstrual cramping is better with toradol. Lasts 5 days. Once a month     Physical exam:   /78 (BP Location: Right arm, Patient Position: Sitting, BP Method: Large (Manual))   Pulse 78   Ht 5' 4" (1.626 m)   Wt 88.4 kg (194 lb 14.2 oz)   LMP 07/07/2017   BMI 33.45 kg/m²     General: alert, oriented x 3, no apparent distress. Affect normal  HEENT: Conjunctivae: anicteric, PERRL, EOMI, TM red fluid, Oralpharynx clear  Neck: supple, no " thyroid enlargement, no cervical lymphadenopathy  Resp: effort normal, lungs clear bilaterally  CV: Regular rate and rhythm without murmurs, gallops or rubs, no lower extremity edema  ABDOMEN: soft, non distended, non tender, bowel sounds present, no hepatosplenomgaly       GYN 5/16  Eye exam - 4/2015     Assessment:/plan  Annual exam  Migraine - continue Fioricet as needed  Thyroid nodule - ultrasound in 2017 unrmarkable  Continue healthy back  Fasting labs   Safety and diet discussed  MEdical fitness referral  Return in one year for physical, sooner if issues.     Answers for HPI/ROS submitted by the patient on 8/7/2017   activity change: No  unexpected weight change: No  neck pain: No  hearing loss: No  rhinorrhea: No  trouble swallowing: No  eye discharge: No  visual disturbance: No  chest tightness: No  wheezing: No  chest pain: No  palpitations: No  blood in stool: No  constipation: No  vomiting: No  diarrhea: No  polydipsia: No  polyuria: No  difficulty urinating: No  hematuria: No  menstrual problem: No  dysuria: No  joint swelling: No  arthralgias: No  headaches: No  weakness: No  confusion: No  dysphoric mood: No

## 2017-08-23 ENCOUNTER — CLINICAL SUPPORT (OUTPATIENT)
Dept: REHABILITATION | Facility: OTHER | Age: 37
End: 2017-08-23
Attending: PHYSICAL MEDICINE & REHABILITATION
Payer: COMMERCIAL

## 2017-08-23 DIAGNOSIS — M51.36 DDD (DEGENERATIVE DISC DISEASE), LUMBAR: Primary | ICD-10-CM

## 2017-08-23 PROCEDURE — 97110 THERAPEUTIC EXERCISES: CPT

## 2017-08-23 NOTE — PROGRESS NOTES
"[facility]      Name: Yanna Kwong  Clinic Number: [id number]  Date of Treatment: 2017   Diagnosis:   Encounter Diagnosis   Name Primary?    DDD (degenerative disc disease), lumbar Yes     Physician: Shirin Krishna, *    Pain pattern determined: 1 PEN  Plan of care signed: 2017   Time in: 5:23 (patient late)  Time Out: 6:00  Total Treatment time: 28  Precautions: Standard  Visit #: 2    POC due:10/14/2017  Reassessment due:2017, done 2017    Subjective   Yanna reports improvement of symptoms.  She feels much better than she did last visit.  She has not been doing her exercises regularly but states that she is trying to do them more often.     Patient reports their pain to be 1/10 on a 0-10 scale with 0 being no pain and 10 being the worst pain imaginable.    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, light duty, sitting, occasionally going up and down stairs)    Leisure: Bowl, walking for exercise, Does "[employer]"                      Pts goals:  Reduce pain, learn how to manage your symptom     Objective     Baseline IM Testing Results:   Date of testin2017  ROM 48-0 deg   Max Peak Torque 82    Min Peak Torque 34    Flex/Ext Ratio 2.4   % below normative data -69      OUTCOMES:   From Initial Evaluation  FOTO: Focus on Therapeutic Outcomes   Category: lumbar   % Impaired: FOTO Not completed at time of eval  Current Score  = CJ = at least 20% but < 40% impaired, limited or restricted  Goal at Discharge Score = CJ = at least 20% but < 40% impaired, limited or restricted    Score interpretation is as follows:    TEST SCORE  Modifier  Impairment Limitation Restriction    0/50  CH  0 % impaired, limited or restricted   1-  CI  @ least 1% but less than 20% impaired, limited or restricted   10-  CJ  @ least 20%<40% impaired, limited or restricted "   20-29/50  CK  @ least 40%<60% impaired, limited or restricted   30-39/50  CL  @ least 60% <80% impaired, limited or restricted   40-49/50  CM  @ least 80%<100% impaired limited or restricted   50/50  CN  100% impaired, limited or restricted       Treatment    Pt was instructed in and performed the following:     Yanna received therapeutic exercises to develop/improved posture, cardiovascular endurance, muscular endurance, lumbar/cervical ROM, strength and muscular endurance for 28 minutes including the following exercises:     [facility] 8/23/2017   Visit Number 2   VAS Pain Rating 1   Flexion in Lying 10   Lumbar Weight 41   Repetitions 15   Rating of Perceived Exertion 3   Ice - Z Lie (in min.) 10       Peripheral muscle strengthening which included 1 set of 15-20 repetitions at a slow, controlled 7 second per rep pace focused on strengthening supporting musculature for improved body mechanics and functional mobility.  Pt and therapist focused on proper form during treatment to ensure optimal strengthening of each targeted muscle group.  Machines were utilized including torso rotation, leg extension, leg curl, chest press, upright row. Tricep extension, bicep curl, leg press, and hip abduction added on third visit.     Home Exercise Program as follows:   Sidelying clamshells 15x, 3-4x weekly   Flexion in lying 10x, 3x/day  Supine pelvic tilts 5-10 s/h 5x, 2x daily   Z-lie 10-20 min, 2x daily  Modified Piriformis stretch 10s/h 3x/day  Handouts were given to the patient. Pt demo good understanding of the education provided. Yanna demonstrated good return demonstration of activities.     Lumbar roll use compliance: no  Additional exercises taught this treatment session: Modified Piriformis stretch     Assessment     Patient tolerated tx well with no increases in symptoms throughout.  Patient was able to tolerate new weight on lumbar med-x machine for 15 reps with an RPE of 3/10.  Review HEP with  patient.  Patient can perform same weight next visit depending on pain level.  Patient was able to perform exercises with moderate vc's for recall.  Patient performed all peripheral resistance machines with no increases in symptoms.  Patient finished with ice in z-lie position.  Patient reported no symptoms upon leaving.     Patient is making good progress towards established goals.  Pt will continue to benefit from skilled outpatient physical therapy to address the deficits stated in the impairment chart, provide pt/family education and to maximize pt's level of independence in the home and community environment.       Pt's spiritual, cultural and educational needs considered and pt agreeable to plan of care and goals as stated below:     Medical necessity is demonstrated by the following IMPAIRMENTS/PROBLEMS:    Pt presents with the following impairments:   History  Co-morbidities and personal factors that may impact the plan of care   Examination  Body Structures and Functions, activity limitations and participation restrictions that may impact the plan of care Clinical Presentation Decision Making/ Complexity Score   Co-morbidities:   Standard           Personal Factors:   lifestyle  character   Body Regions:   back  lower extremities     Body Systems:   gross symmetry  ROM  strength  gross coordinated movement  transitions     Activity limitations:   Learning and applying knowledge  no deficits     General Tasks and Commands  no deficits     Communication  no deficits     Mobility  lifting and carrying objects  walking  driving (bike, car, motorcycle)  Standing     Self care  washing oneself (bathing, drying, washing hands)  caring for body parts (brushing teeth, shaving, grooming)  dressing     Domestic Life  doing house work (cleaning house, washing dishes, laundry)     Interactions/Relationships  no deficits     Life Areas  employment     Community and Social Life  community life  recreation and  leisure     Participation Restrictions:   Increased pain with prolonged sitting needed for work. Also limited ability to perform exercise.     stable and uncomplicated    Low      Goals:   Short term goals:  6 weeks or 10 visits   1.  Pt will demonstrate increased lumbar ROM by at least 3 degrees from the initial ROM value with improvements noted in functional ROM and ability to perform ADLs. (progressing, not met)  2.  Pt will demonstrate increased maximum isometric torque value by 10% when compared to the initial value resulting in improved ability to perform bending, lifting, and carrying activities safely, confidently.  (progressing, not met)  3.  Patient report a reduction in worst pain score by 1-2 points for improved tolerance during work and recreational activities.  (progressing, not met)  4.  Pt able to perform HEP correctly with minimal cueing or supervision for therapist.  (progressing, not met)     Long term goals: 13 weeks or 20 visits   1. Pt will demonstrate increased lumbar ROM by at least 6 degrees from initial ROM value, resulting in improved ability to perform functional fwd bending while standing and sitting.  (progressing, not met)  2. Pt will demonstrate increased maximum isometric torque value by 20% when compared to the initial value resulting in improved ability to perform bending, lifting, and carrying activities safely, confidently.  (progressing, not met)  3. Pt to demonstrate ability to independently control and reduce their pain through posture positioning and mechanical movements throughout a typical day.  (progressing, not met)  4.  Patient will demonstrate improved overall function per FOTO Survey to CJ = at least 20% but < 40% impaired, limited or restricted score or less.  (progressing, not met)         Plan   Continue with established Plan of Care towards established PT goals.

## 2017-08-29 ENCOUNTER — CLINICAL SUPPORT (OUTPATIENT)
Dept: REHABILITATION | Facility: OTHER | Age: 37
End: 2017-08-29
Attending: PHYSICAL MEDICINE & REHABILITATION
Payer: COMMERCIAL

## 2017-08-29 DIAGNOSIS — M54.41 CHRONIC RIGHT-SIDED LOW BACK PAIN WITH RIGHT-SIDED SCIATICA: ICD-10-CM

## 2017-08-29 DIAGNOSIS — G89.29 CHRONIC RIGHT-SIDED LOW BACK PAIN WITH RIGHT-SIDED SCIATICA: ICD-10-CM

## 2017-08-29 PROBLEM — M54.9 BACK PAIN: Status: ACTIVE | Noted: 2017-08-29

## 2017-08-29 PROCEDURE — 97110 THERAPEUTIC EXERCISES: CPT

## 2017-08-29 NOTE — PROGRESS NOTES
"[facility] Treatment      Name: Yanna Kwong  Clinic Number: [id number]  Date of Treatment: 2017   Diagnosis:   Encounter Diagnosis   Name Primary?    Chronic right-sided low back pain with right-sided sciatica      Physician: Shirin Krishna, *    Pain pattern determined: 1 PEN  Plan of care signed: 2017   Time in: 5:00  Time Out: 6:00  Total Treatment time: 45  Precautions: Standard  Visit #: 3    POC due:10/14/2017  Reassessment due:2017, done 2017  Next reassessment due: 17    Pt has completed previous PT with [facility] and is now charged as commercial insurance.       Subjective   Yanna reports improvement of symptoms.  She feels much better than she did last visit.  She has not been doing her exercises regularly but states that she is trying to do them more often. She notes mild increase in "twinging" of low back after last session. She doesn't report any significant change in symptoms throughout session.     Patient reports their pain to be 1/10 on a 0-10 scale with 0 being no pain and 10 being the worst pain imaginable.    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, light duty, sitting, occasionally going up and down stairs)    Leisure: Bowl, walking for exercise, Does "[employer]"                      Pts goals:  Reduce pain, learn how to manage your symptom     Objective     Baseline IM Testing Results:   Date of testin2017  ROM 48-0 deg   Max Peak Torque 82    Min Peak Torque 34    Flex/Ext Ratio 2.4   % below normative data -69      OUTCOMES:   From Initial Evaluation  FOTO: Focus on Therapeutic Outcomes   Category: lumbar   % Impaired: FOTO Not completed at time of eval  Current Score  = CJ = at least 20% but < 40% impaired, limited or restricted  Goal at Discharge Score = CJ = at least 20% but < 40% impaired, limited or restricted    Score " interpretation is as follows:    TEST SCORE  Modifier  Impairment Limitation Restriction    0/50  CH  0 % impaired, limited or restricted   1-9/50  CI  @ least 1% but less than 20% impaired, limited or restricted   10-19/50  CJ  @ least 20%<40% impaired, limited or restricted   20-29/50  CK  @ least 40%<60% impaired, limited or restricted   30-39/50  CL  @ least 60% <80% impaired, limited or restricted   40-49/50  CM  @ least 80%<100% impaired limited or restricted   50/50  CN  100% impaired, limited or restricted       Treatment    Pt was instructed in and performed the following:     Yanna received therapeutic exercises to develop/improved posture, cardiovascular endurance, muscular endurance, lumbar/cervical ROM, strength and muscular endurance for 28 minutes including the following exercises:     [facility] 8/29/2017   Visit Number 3   VAS Pain Rating 1   Treadmill Time (in min.) 5   Speed 2.5   Flexion in Lying 10   Lumbar Weight 41   Repetitions 20   Rating of Perceived Exertion 5   Ice - Z Lie (in min.) 10         Peripheral muscle strengthening which included 1 set of 15-20 repetitions at a slow, controlled 7 second per rep pace focused on strengthening supporting musculature for improved body mechanics and functional mobility.  Pt and therapist focused on proper form during treatment to ensure optimal strengthening of each targeted muscle group.  Machines were utilized including torso rotation, leg extension, leg curl, chest press, upright row. Tricep extension, bicep curl, leg press, and hip abduction added on third visit.     Home Exercise Program as follows:   Sidelying clamshells 15x, 3-4x weekly   Flexion in lying 10x, 3x/day  Supine pelvic tilts 5-10 s/h 5x, 2x daily   Z-lie 10-20 min, 2x daily  Modified Piriformis stretch 10s/h 3x/day  Handouts were given to the patient. Pt demo good understanding of the education provided. Yanna demonstrated good return demonstration of activities.      Lumbar roll use compliance: no  Additional exercises taught this treatment session:  HEP review     Assessment     Patient tolerated tx well with no increases in symptoms throughout.  Patient was able to tolerate same weight on lumbar med-x machine with an RPE of 5/10. Plan to increased weight 5% as tolerated. Reviewed HEP with patient. Patient was able to perform exercises with mild vc's for recall. She required mild v/c to hold contraction of PPT for up to 10 seconds. Patient performed all peripheral resistance machines with no increases in symptoms.  Patient finished with ice in z-lie position.  Patient reported no change in symptoms upon leaving.     Patient is making good progress towards established goals.  Pt will continue to benefit from skilled outpatient physical therapy to address the deficits stated in the impairment chart, provide pt/family education and to maximize pt's level of independence in the home and community environment.       Pt's spiritual, cultural and educational needs considered and pt agreeable to plan of care and goals as stated below:     Medical necessity is demonstrated by the following IMPAIRMENTS/PROBLEMS:    Pt presents with the following impairments:   History  Co-morbidities and personal factors that may impact the plan of care   Examination  Body Structures and Functions, activity limitations and participation restrictions that may impact the plan of care Clinical Presentation Decision Making/ Complexity Score   Co-morbidities:   Standard           Personal Factors:   lifestyle  character   Body Regions:   back  lower extremities     Body Systems:   gross symmetry  ROM  strength  gross coordinated movement  transitions     Activity limitations:   Learning and applying knowledge  no deficits     General Tasks and Commands  no deficits     Communication  no deficits     Mobility  lifting and carrying objects  walking  driving (bike, car, motorcycle)  Standing     Self  care  washing oneself (bathing, drying, washing hands)  caring for body parts (brushing teeth, shaving, grooming)  dressing     Domestic Life  doing house work (cleaning house, washing dishes, laundry)     Interactions/Relationships  no deficits     Life Areas  employment     Community and Social Life  community life  recreation and leisure     Participation Restrictions:   Increased pain with prolonged sitting needed for work. Also limited ability to perform exercise.     stable and uncomplicated    Low      Goals:   Short term goals:  6 weeks or 10 visits   1.  Pt will demonstrate increased lumbar ROM by at least 3 degrees from the initial ROM value with improvements noted in functional ROM and ability to perform ADLs. (progressing, not met)  2.  Pt will demonstrate increased maximum isometric torque value by 10% when compared to the initial value resulting in improved ability to perform bending, lifting, and carrying activities safely, confidently.  (progressing, not met)  3.  Patient report a reduction in worst pain score by 1-2 points for improved tolerance during work and recreational activities.  (progressing, not met)  4.  Pt able to perform HEP correctly with minimal cueing or supervision for therapist.  (progressing, not met)     Long term goals: 13 weeks or 20 visits   1. Pt will demonstrate increased lumbar ROM by at least 6 degrees from initial ROM value, resulting in improved ability to perform functional fwd bending while standing and sitting.  (progressing, not met)  2. Pt will demonstrate increased maximum isometric torque value by 20% when compared to the initial value resulting in improved ability to perform bending, lifting, and carrying activities safely, confidently.  (progressing, not met)  3. Pt to demonstrate ability to independently control and reduce their pain through posture positioning and mechanical movements throughout a typical day.  (progressing, not met)  4.  Patient will  demonstrate improved overall function per FOTO Survey to CJ = at least 20% but < 40% impaired, limited or restricted score or less.  (progressing, not met)         Plan   Continue with established Plan of Care towards established PT goals.

## 2017-08-31 ENCOUNTER — CLINICAL SUPPORT (OUTPATIENT)
Dept: REHABILITATION | Facility: OTHER | Age: 37
End: 2017-08-31
Attending: PHYSICAL MEDICINE & REHABILITATION
Payer: COMMERCIAL

## 2017-08-31 DIAGNOSIS — M50.30 DDD (DEGENERATIVE DISC DISEASE), CERVICAL: Primary | ICD-10-CM

## 2017-08-31 NOTE — PROGRESS NOTES
"[facility] Treatment      Name: Yanna Kwong  Clinic Number: [id number]  Date of Treatment: 2017   Diagnosis:   No diagnosis found.  Physician: Shirin Krishna, *    Pain pattern determined: 1 PEN  Plan of care signed: 2017   Time in: 5:00  Time Out: 6:00  Total Treatment time: 45  Precautions: Standard  Visit #: 3    POC due:10/14/2017  Reassessment due:2017, done 2017  Next reassessment due: 17    Pt has completed previous PT with [facility] and is now charged as commercial insurance.       Subjective   Yanna reports improvement of symptoms.  She feels much better than she did last visit.  She has not been doing her exercises regularly but states that she is trying to do them more often. She notes mild increase in "twinging" of low back after last session. She doesn't report any significant change in symptoms throughout session.     Patient reports their pain to be 1/10 on a 0-10 scale with 0 being no pain and 10 being the worst pain imaginable.    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, light duty, sitting, occasionally going up and down stairs)    Leisure: Bowl, walking for exercise, Does "[employer]"                      Pts goals:  Reduce pain, learn how to manage your symptom     Objective     Baseline IM Testing Results:   Date of testin2017  ROM 48-0 deg   Max Peak Torque 82    Min Peak Torque 34    Flex/Ext Ratio 2.4   % below normative data -69      OUTCOMES:   From Initial Evaluation  FOTO: Focus on Therapeutic Outcomes   Category: lumbar   % Impaired: FOTO Not completed at time of eval  Current Score  = CJ = at least 20% but < 40% impaired, limited or restricted  Goal at Discharge Score = CJ = at least 20% but < 40% impaired, limited or restricted    Score interpretation is as follows:    TEST SCORE  Modifier  Impairment Limitation Restriction "    0/50  CH  0 % impaired, limited or restricted   1-9/50  CI  @ least 1% but less than 20% impaired, limited or restricted   10-19/50  CJ  @ least 20%<40% impaired, limited or restricted   20-29/50  CK  @ least 40%<60% impaired, limited or restricted   30-39/50  CL  @ least 60% <80% impaired, limited or restricted   40-49/50  CM  @ least 80%<100% impaired limited or restricted   50/50  CN  100% impaired, limited or restricted       Treatment    Pt was instructed in and performed the following:     Yanna received therapeutic exercises to develop/improved posture, cardiovascular endurance, muscular endurance, lumbar/cervical ROM, strength and muscular endurance for 28 minutes including the following exercises:     [facility] 8/31/2017   Visit Number 4   VAS Pain Rating 0.5   Treadmill Time (in min.) 5   Speed 2.5   Flexion in Lying 10   Lumbar Weight 45   Repetitions 20   Rating of Perceived Exertion 3.5   Ice - Z Lie (in min.) 10       Peripheral muscle strengthening which included 1 set of 15-20 repetitions at a slow, controlled 7 second per rep pace focused on strengthening supporting musculature for improved body mechanics and functional mobility.  Pt and therapist focused on proper form during treatment to ensure optimal strengthening of each targeted muscle group.  Machines were utilized including torso rotation, leg extension, leg curl, chest press, upright row. Tricep extension, bicep curl, leg press, and hip abduction added on third visit.     Home Exercise Program as follows:   Sidelying clamshells 15x, 3-4x weekly   Flexion in lying 10x, 3x/day  Supine pelvic tilts 5-10 s/h 5x, 2x daily   Z-lie 10-20 min, 2x daily  Modified Piriformis stretch 10s/h 3x/day  Handouts were given to the patient. Pt demo good understanding of the education provided. Yanna demonstrated good return demonstration of activities.     Lumbar roll use compliance: no  Additional exercises taught this treatment  session:  HEP review     Assessment     Patient tolerated tx well with no increases in symptoms throughout.  Patient was able to tolerate increased weight on lumbar med-x machine with an RPE of 3.5/10. Plan to increased weight 5% as tolerated. Reviewed HEP with patient. Patient was able to perform exercises with mild vc's for recall. She required mild v/c to hold contraction of PPT for up to 10 seconds. Patient performed all peripheral resistance machines with no increases in symptoms.  Patient finished with ice in z-lie position.  Patient reported no change in symptoms upon leaving.     Patient is making good progress towards established goals.  Pt will continue to benefit from skilled outpatient physical therapy to address the deficits stated in the impairment chart, provide pt/family education and to maximize pt's level of independence in the home and community environment.       Pt's spiritual, cultural and educational needs considered and pt agreeable to plan of care and goals as stated below:     Medical necessity is demonstrated by the following IMPAIRMENTS/PROBLEMS:    Pt presents with the following impairments:   History  Co-morbidities and personal factors that may impact the plan of care   Examination  Body Structures and Functions, activity limitations and participation restrictions that may impact the plan of care Clinical Presentation Decision Making/ Complexity Score   Co-morbidities:   Standard           Personal Factors:   lifestyle  character   Body Regions:   back  lower extremities     Body Systems:   gross symmetry  ROM  strength  gross coordinated movement  transitions     Activity limitations:   Learning and applying knowledge  no deficits     General Tasks and Commands  no deficits     Communication  no deficits     Mobility  lifting and carrying objects  walking  driving (bike, car, motorcycle)  Standing     Self care  washing oneself (bathing, drying, washing hands)  caring for body parts  (brushing teeth, shaving, grooming)  dressing     Domestic Life  doing house work (cleaning house, washing dishes, laundry)     Interactions/Relationships  no deficits     Life Areas  employment     Community and Social Life  community life  recreation and leisure     Participation Restrictions:   Increased pain with prolonged sitting needed for work. Also limited ability to perform exercise.     stable and uncomplicated    Low      Goals:   Short term goals:  6 weeks or 10 visits   1.  Pt will demonstrate increased lumbar ROM by at least 3 degrees from the initial ROM value with improvements noted in functional ROM and ability to perform ADLs. (progressing, not met)  2.  Pt will demonstrate increased maximum isometric torque value by 10% when compared to the initial value resulting in improved ability to perform bending, lifting, and carrying activities safely, confidently.  (progressing, not met)  3.  Patient report a reduction in worst pain score by 1-2 points for improved tolerance during work and recreational activities.  (progressing, not met)  4.  Pt able to perform HEP correctly with minimal cueing or supervision for therapist.  (progressing, not met)     Long term goals: 13 weeks or 20 visits   1. Pt will demonstrate increased lumbar ROM by at least 6 degrees from initial ROM value, resulting in improved ability to perform functional fwd bending while standing and sitting.  (progressing, not met)  2. Pt will demonstrate increased maximum isometric torque value by 20% when compared to the initial value resulting in improved ability to perform bending, lifting, and carrying activities safely, confidently.  (progressing, not met)  3. Pt to demonstrate ability to independently control and reduce their pain through posture positioning and mechanical movements throughout a typical day.  (progressing, not met)  4.  Patient will demonstrate improved overall function per FOTO Survey to CJ = at least 20% but < 40%  impaired, limited or restricted score or less.  (progressing, not met)         Plan   Continue with established Plan of Care towards established PT goals.

## 2017-09-05 ENCOUNTER — TELEPHONE (OUTPATIENT)
Dept: OBSTETRICS AND GYNECOLOGY | Facility: CLINIC | Age: 37
End: 2017-09-05

## 2017-09-05 RX ORDER — KETOROLAC TROMETHAMINE 10 MG/1
10 TABLET, FILM COATED ORAL EVERY 8 HOURS
Qty: 18 TABLET | Refills: 2 | Status: SHIPPED | OUTPATIENT
Start: 2017-09-05 | End: 2018-07-17 | Stop reason: SDUPTHER

## 2017-09-05 NOTE — TELEPHONE ENCOUNTER
Patient notified refill sent to the pharmacy and she states she is aware she is not to take the Toradol and Anaprox during the same time

## 2017-09-05 NOTE — TELEPHONE ENCOUNTER
----- Message from Brittanie Wright sent at 9/5/2017 12:47 PM CDT -----  Contact: [phone]  Patient is returning your call, she will will call the pharm. To  meds

## 2017-09-08 ENCOUNTER — OFFICE VISIT (OUTPATIENT)
Dept: SPINE | Facility: CLINIC | Age: 37
End: 2017-09-08
Attending: PHYSICAL MEDICINE & REHABILITATION
Payer: COMMERCIAL

## 2017-09-08 VITALS
BODY MASS INDEX: 32.61 KG/M2 | WEIGHT: 191 LBS | HEIGHT: 64 IN | HEART RATE: 66 BPM | DIASTOLIC BLOOD PRESSURE: 61 MMHG | SYSTOLIC BLOOD PRESSURE: 108 MMHG

## 2017-09-08 DIAGNOSIS — M54.41 CHRONIC RIGHT-SIDED LOW BACK PAIN WITH RIGHT-SIDED SCIATICA: Primary | ICD-10-CM

## 2017-09-08 DIAGNOSIS — G57.01 PIRIFORMIS SYNDROME, RIGHT: ICD-10-CM

## 2017-09-08 DIAGNOSIS — G89.29 CHRONIC RIGHT-SIDED LOW BACK PAIN WITH RIGHT-SIDED SCIATICA: Primary | ICD-10-CM

## 2017-09-08 PROCEDURE — 3008F BODY MASS INDEX DOCD: CPT | Mod: S$GLB,,, | Performed by: PHYSICAL MEDICINE & REHABILITATION

## 2017-09-08 PROCEDURE — 99999 PR PBB SHADOW E&M-EST. PATIENT-LVL III: CPT | Mod: PBBFAC,,, | Performed by: PHYSICAL MEDICINE & REHABILITATION

## 2017-09-08 PROCEDURE — 99213 OFFICE O/P EST LOW 20 MIN: CPT | Mod: S$GLB,,, | Performed by: PHYSICAL MEDICINE & REHABILITATION

## 2017-09-08 NOTE — PROGRESS NOTES
Subjective:      Patient ID: Yanna Kwong is a 37 y.o. female.    Chief Complaint: Low-back Pain (right down leg)    Ms Kwong is a 38 yo female here for follow up of her low back pain.  She has had back pain since early June.  She was last seen by me on 6/16/2017 and [facility] and has been to 4 visits.  She completed [facility] with relief of her neck pain completing therapy 1/2015.  Today, she is doing well.  She feels like the medicine and therapy has been helpful.  She feels like it is doing better.  She is going to therapy, has only been to a few visits.  She is much better than before.  She has been able to garden.  She can get out of bed and out of the chair.  The pain is not constant.  It ranges from 0-2.5/10.  The pain is on the right side.      Past Medical History:  No date: Thyroid nodule    Past Surgical History:  No date: BREAST BIOPSY  No date: WISDOM TOOTH EXTRACTION    Review of patient's family history indicates:    Heart disease                  Mother                    Cataracts                      Father                    Macular degeneration           Father                    Hypertension                   Paternal Grandfather      Prostate cancer                Paternal Grandfather      Diabetes                       Paternal Grandmother      Heart disease                  Paternal Grandmother      Heart attack                   Paternal Grandmother      Hypertension                   Maternal Grandmother      Hypertension                   Maternal Grandfather      Heart failure                  Maternal Grandfather      Heart attack                   Maternal Grandfather      Colon cancer                   Maternal Uncle            Stroke                         Maternal Uncle            Atrial fibrillation            Maternal Aunt             Hypertension                   Maternal Aunt             Heart attack                   Cousin                     Amblyopia                      Neg Hx                    Blindness                      Neg Hx                    Glaucoma                       Neg Hx                    Retinal detachment             Neg Hx                    Strabismus                     Neg Hx                    Thyroid disease                Neg Hx                    Breast cancer                  Neg Hx                    Ovarian cancer                 Neg Hx                      Social History    Marital status: Single              Spouse name:                       Years of education:                 Number of children:               Occupational History  Occupation          Employer            Comment                                   [employer]     Social History Main Topics    Smoking status: Never Smoker                                                                Smokeless tobacco: Never Used                        Alcohol use: No              Drug use: No              Sexual activity: No                      Comment: Never sexually active    Social History Narrative    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     Graduated from [employer] ([place])    Degree in graphic Design from [employer]        Current Outpatient Prescriptions:  ascorbic acid (VITAMIN C) 1000 MG tablet, Take 1,000 mg by mouth once daily., Disp: , Rfl:   b complex vitamins capsule, Take 1 capsule by mouth once daily., Disp: , Rfl:   BIOTIN ORAL, Take 1 tablet by mouth once daily., Disp: , Rfl:   fish oil-omega-3 fatty acids 300-1,000 mg capsule, Take 1 g by mouth once daily., Disp: , Rfl:   folic acid (FOLVITE) 400 MCG tablet, Take 400 mcg by mouth once daily., Disp: , Rfl:   ketorolac (TORADOL) 10 mg tablet, Take 1 tablet (10 mg total) by mouth every 8 (eight) hours. Use only 3 days per month, Disp: 18 tablet, Rfl: 2  methocarbamol (ROBAXIN) 500 MG Tab, Take 1-2 tablets (500-1,000 mg total) by mouth nightly as needed., Disp: 60 tablet, Rfl: 2  multivitamin  (ONE DAILY MULTIVITAMIN) per tablet, Take 1 tablet by mouth once daily., Disp: , Rfl:   vitamin E 400 UNIT capsule, Take 400 Units by mouth once daily., Disp: , Rfl:     No current facility-administered medications for this visit.       Review of patient's allergies indicates:   -- Vicodin (hydrocodone-acetaminophen) -- Other (See Comments)    --  tongue   -- Erythromycin -- Itching and Rash   -- Ilosone -- Rash          Review of Systems   Constitution: Negative for weight gain and weight loss.   Cardiovascular: Negative for chest pain.   Respiratory: Negative for shortness of breath.    Musculoskeletal: Positive for back pain, joint pain and neck pain (comes and goes). Negative for joint swelling.   Gastrointestinal: Negative for abdominal pain and bowel incontinence.   Genitourinary: Negative for bladder incontinence.   Neurological: Negative for numbness.         Objective:        General: Yanna is well-developed, well-nourished, appears stated age, in no acute distress, alert and oriented to time, place and person.     General    Vitals reviewed.  Constitutional: She is oriented to person, place, and time. She appears well-developed and well-nourished.   HENT:   Head: Normocephalic and atraumatic.   Pulmonary/Chest: Effort normal.   Neurological: She is alert and oriented to person, place, and time.   Psychiatric: She has a normal mood and affect. Her behavior is normal. Judgment and thought content normal.     General Musculoskeletal Exam   Gait: normal     Right Ankle/Foot Exam     Tests   Heel Walk: able to perform  Tiptoe Walk: able to perform    Left Ankle/Foot Exam     Tests   Heel Walk: able to perform  Tiptoe Walk: able to perform  Back (L-Spine & T-Spine) / Neck (C-Spine) Exam     Back (L-Spine & T-Spine) Range of Motion   Extension: 30   Flexion: 90   Lateral Bend Right: 20   Lateral Bend Left: 20   Rotation Right: 40   Rotation Left: 40     Spinal Sensation   Right Side Sensation  C-Spine Level:  normal   L-Spine Level: normal  S-Spine Level: normal  Left Side Sensation  C-Spine Level: normal  L-Spine Level: normal  S-Spine Level: normal    Back (L-Spine & T-Spine) Tests   Right Side Tests  Straight leg raise:      Sitting SLR: > 70 degrees      Left Side Tests  Straight leg raise:     Sitting SLR: > 70 degrees          Other She has no scoliosis .  Spinal Kyphosis:  Absent      Muscle Strength   Right Lower Extremity   Hip Flexion: 5/5   Quadriceps:  5/5   Anterior tibial:  5/5/5  EHL:  5/5  Left Lower Extremity   Hip Flexion: 5/5   Quadriceps:  5/5   Anterior tibial:  5/5/5   EHL:  5/5    Reflexes     Left Side  Biceps:  2+  Triceps:  2+  Brachioradialis:  2+  Quadriceps:  2+  Achilles:  2+  Left Mcduffie's Sign:  Absent  Babinski Sign:  absent    Right Side   Biceps:  2+  Triceps:  2+  Brachioradialis:  2+  Quadriceps:  2+  Achilles:  2+  Right Mcduffie's Sign:  absent  Babinski Sign:  absent    Vascular Exam     Right Pulses        Carotid:                  2+    Left Pulses        Carotid:                  2+              Assessment:       1. Chronic right-sided low back pain with right-sided sciatica    2. Piriformis syndrome, right           Plan:          1.  Continue PT for progressive resistance exercises pattern 1 lumbar  2.  Continue activity, and stretches for neck and back  3.  Naproxen 550mg as needed  4.   Robaxin 500-1000 at night as needed:  She does not need the medicine often  6.  rtc 6 months      Follow-up: Return in about 6 months (around 3/8/2018). If there are any questions prior to this, the patient was instructed to contact the office.

## 2017-09-11 ENCOUNTER — CLINICAL SUPPORT (OUTPATIENT)
Dept: REHABILITATION | Facility: OTHER | Age: 37
End: 2017-09-11
Attending: PHYSICAL MEDICINE & REHABILITATION
Payer: COMMERCIAL

## 2017-09-11 DIAGNOSIS — M54.41 CHRONIC RIGHT-SIDED LOW BACK PAIN WITH RIGHT-SIDED SCIATICA: ICD-10-CM

## 2017-09-11 DIAGNOSIS — G89.29 CHRONIC RIGHT-SIDED LOW BACK PAIN WITH RIGHT-SIDED SCIATICA: ICD-10-CM

## 2017-09-11 PROCEDURE — 97110 THERAPEUTIC EXERCISES: CPT | Performed by: PHYSICAL MEDICINE & REHABILITATION

## 2017-09-11 NOTE — PROGRESS NOTES
"[facility] Treatment      Name: Yanna Kwong  Clinic Number: [id number]  Date of Treatment: 2017   Diagnosis:   Encounter Diagnosis   Name Primary?    Chronic right-sided low back pain with right-sided sciatica      Physician: Shirin Krishna, *    Pain pattern determined: 1 PEN  Plan of care signed: 2017   Time in: 4:20 pm  Time Out: 5:20 pm  Total Treatment time: 45  Precautions: Standard  Visit #: 5    POC due:10/14/2017  Reassessment due:2017, done 2017  Next reassessment due: 17    Pt has completed previous PT with [facility] and is now charged as commercial insurance.       Subjective   Yanna reports improvement of symptoms.  She feels much better than she did last visit.  She has not been doing her exercises regularly but states that she is trying to do them more often.  Patient reports their pain to be 1/10 on a 0-10 scale with 0 being no pain and 10 being the worst pain imaginable.  No pain after visit.    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, light duty, sitting, occasionally going up and down stairs)    Leisure: Bowl, walking for exercise, Does "[employer]"                      Pts goals:  Reduce pain, learn how to manage your symptom     Objective     Baseline IM Testing Results:   Date of testin2017  ROM 48-0 deg   Max Peak Torque 82    Min Peak Torque 34    Flex/Ext Ratio 2.4   % below normative data -69      OUTCOMES:   From Initial Evaluation  FOTO: Focus on Therapeutic Outcomes   Category: lumbar   % Impaired: FOTO Not completed at time of eval  Current Score  = CJ = at least 20% but < 40% impaired, limited or restricted  Goal at Discharge Score = CJ = at least 20% but < 40% impaired, limited or restricted    Score interpretation is as follows:    TEST SCORE  Modifier  Impairment Limitation Restriction    0/50  CH  0 % impaired, limited " or restricted   1-9/50  CI  @ least 1% but less than 20% impaired, limited or restricted   10-19/50  CJ  @ least 20%<40% impaired, limited or restricted   20-29/50  CK  @ least 40%<60% impaired, limited or restricted   30-39/50  CL  @ least 60% <80% impaired, limited or restricted   40-49/50  CM  @ least 80%<100% impaired limited or restricted   50/50  CN  100% impaired, limited or restricted       Treatment    Pt was instructed in and performed the following:     Yanna received therapeutic exercises to develop/improved posture, cardiovascular endurance, muscular endurance, lumbar/cervical ROM, strength and muscular endurance for 28 minutes including the following exercises:     Sidelying clamshells 15x cuing to watch pelvic and engage core  Flexion in lying 10x,   Supine pelvic tilts 10   Modified Piriformis stretch 10 cuing for positioning hip    [facility] 9/11/2017   Visit Number 5   VAS Pain Rating 1   Treadmill Time (in min.) 10   Speed 2.6   Flexion in Lying 10   Lumbar Weight 49   Repetitions 15   Rating of Perceived Exertion 3   Ice - Z Lie (in min.) 10             Peripheral muscle strengthening which included 1 set of 15-20 repetitions at a slow, controlled 7 second per rep pace focused on strengthening supporting musculature for improved body mechanics and functional mobility.  Pt and therapist focused on proper form during treatment to ensure optimal strengthening of each targeted muscle group.  Machines were utilized including torso rotation, leg extension, leg curl, chest press, upright row. Tricep extension, bicep curl, leg press, and hip abduction added on third visit.     Home Exercise Program as follows:   Sidelying clamshells 15x, 3-4x weekly   Flexion in lying 10x, 3x/day  Supine pelvic tilts 5-10 s/h 5x, 2x daily   Z-lie 10-20 min, 2x daily  Modified Piriformis stretch 10s/h 3x/day  Handouts were given to the patient. Pt demo good understanding of the education provided. Yanna  demonstrated good return demonstration of activities.     Lumbar roll use compliance: no  Additional exercises taught this treatment session:  HEP review     Assessment     Patient tolerated tx well with no increases in symptoms throughout. Mild symptoms pre visit and 0/10 post visit.     Tolerating stretches well with min cuing needed.   Reviewed HEP with patient. Patient was able to perform exercises with mild vc's for recall. She required mild v/c to hold contraction of PPT for up to 10 seconds. Patient performed lumbar med ex and all peripheral resistance machines with no increases in symptoms.  Patient finished with ice in z-lie position.  Patient reported no symptoms upon leaving.     Patient is making good progress towards established goals.  Pt will continue to benefit from skilled outpatient physical therapy to address the deficits stated in the impairment chart, provide pt/family education and to maximize pt's level of independence in the home and community environment.       Pt's spiritual, cultural and educational needs considered and pt agreeable to plan of care and goals as stated below:     Medical necessity is demonstrated by the following IMPAIRMENTS/PROBLEMS:    Pt presents with the following impairments:   History  Co-morbidities and personal factors that may impact the plan of care   Examination  Body Structures and Functions, activity limitations and participation restrictions that may impact the plan of care Clinical Presentation Decision Making/ Complexity Score   Co-morbidities:   Standard           Personal Factors:   lifestyle  character   Body Regions:   back  lower extremities     Body Systems:   gross symmetry  ROM  strength  gross coordinated movement  transitions     Activity limitations:   Learning and applying knowledge  no deficits     General Tasks and Commands  no deficits     Communication  no deficits     Mobility  lifting and carrying objects  walking  driving (bike, car,  motorcycle)  Standing     Self care  washing oneself (bathing, drying, washing hands)  caring for body parts (brushing teeth, shaving, grooming)  dressing     Domestic Life  doing house work (cleaning house, washing dishes, laundry)     Interactions/Relationships  no deficits     Life Areas  employment     Community and Social Life  community life  recreation and leisure     Participation Restrictions:   Increased pain with prolonged sitting needed for work. Also limited ability to perform exercise.     stable and uncomplicated    Low      Goals:   Short term goals:  6 weeks or 10 visits   1.  Pt will demonstrate increased lumbar ROM by at least 3 degrees from the initial ROM value with improvements noted in functional ROM and ability to perform ADLs. (progressing, not met)  2.  Pt will demonstrate increased maximum isometric torque value by 10% when compared to the initial value resulting in improved ability to perform bending, lifting, and carrying activities safely, confidently.  (progressing, not met)  3.  Patient report a reduction in worst pain score by 1-2 points for improved tolerance during work and recreational activities.  (progressing, not met)  4.  Pt able to perform HEP correctly with minimal cueing or supervision for therapist.  (progressing, not met)     Long term goals: 13 weeks or 20 visits   1. Pt will demonstrate increased lumbar ROM by at least 6 degrees from initial ROM value, resulting in improved ability to perform functional fwd bending while standing and sitting.  (progressing, not met)  2. Pt will demonstrate increased maximum isometric torque value by 20% when compared to the initial value resulting in improved ability to perform bending, lifting, and carrying activities safely, confidently.  (progressing, not met)  3. Pt to demonstrate ability to independently control and reduce their pain through posture positioning and mechanical movements throughout a typical day.  (progressing, not  met)  4.  Patient will demonstrate improved overall function per FOTO Survey to CJ = at least 20% but < 40% impaired, limited or restricted score or less.  (progressing, not met)         Plan   Continue with established Plan of Care towards established PT goals.

## 2017-09-12 ENCOUNTER — PATIENT MESSAGE (OUTPATIENT)
Dept: OBSTETRICS AND GYNECOLOGY | Facility: CLINIC | Age: 37
End: 2017-09-12

## 2017-09-13 ENCOUNTER — PATIENT MESSAGE (OUTPATIENT)
Dept: OBSTETRICS AND GYNECOLOGY | Facility: CLINIC | Age: 37
End: 2017-09-13

## 2017-09-13 ENCOUNTER — OFFICE VISIT (OUTPATIENT)
Dept: OBSTETRICS AND GYNECOLOGY | Facility: CLINIC | Age: 37
End: 2017-09-13
Payer: COMMERCIAL

## 2017-09-13 ENCOUNTER — LAB VISIT (OUTPATIENT)
Dept: LAB | Facility: HOSPITAL | Age: 37
End: 2017-09-13
Attending: OBSTETRICS & GYNECOLOGY
Payer: COMMERCIAL

## 2017-09-13 VITALS
SYSTOLIC BLOOD PRESSURE: 126 MMHG | BODY MASS INDEX: 32.33 KG/M2 | DIASTOLIC BLOOD PRESSURE: 72 MMHG | WEIGHT: 189.38 LBS | HEIGHT: 64 IN

## 2017-09-13 DIAGNOSIS — Z11.3 SCREENING EXAMINATION FOR STD (SEXUALLY TRANSMITTED DISEASE): Primary | ICD-10-CM

## 2017-09-13 DIAGNOSIS — Z11.3 SCREENING EXAMINATION FOR STD (SEXUALLY TRANSMITTED DISEASE): ICD-10-CM

## 2017-09-13 PROCEDURE — 87480 CANDIDA DNA DIR PROBE: CPT

## 2017-09-13 PROCEDURE — 86803 HEPATITIS C AB TEST: CPT

## 2017-09-13 PROCEDURE — 86592 SYPHILIS TEST NON-TREP QUAL: CPT

## 2017-09-13 PROCEDURE — 86703 HIV-1/HIV-2 1 RESULT ANTBDY: CPT

## 2017-09-13 PROCEDURE — 87660 TRICHOMONAS VAGIN DIR PROBE: CPT

## 2017-09-13 PROCEDURE — 99213 OFFICE O/P EST LOW 20 MIN: CPT | Mod: S$GLB,,, | Performed by: OBSTETRICS & GYNECOLOGY

## 2017-09-13 PROCEDURE — 86695 HERPES SIMPLEX TYPE 1 TEST: CPT

## 2017-09-13 PROCEDURE — 87591 N.GONORRHOEAE DNA AMP PROB: CPT

## 2017-09-13 PROCEDURE — 87340 HEPATITIS B SURFACE AG IA: CPT

## 2017-09-13 PROCEDURE — 36415 COLL VENOUS BLD VENIPUNCTURE: CPT

## 2017-09-13 PROCEDURE — 99999 PR PBB SHADOW E&M-EST. PATIENT-LVL III: CPT | Mod: PBBFAC,,, | Performed by: OBSTETRICS & GYNECOLOGY

## 2017-09-13 PROCEDURE — 3008F BODY MASS INDEX DOCD: CPT | Mod: S$GLB,,, | Performed by: OBSTETRICS & GYNECOLOGY
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
## 2017-09-14 LAB
C TRACH DNA SPEC QL NAA+PROBE: NOT DETECTED
CANDIDA RRNA VAG QL PROBE: NEGATIVE
G VAGINALIS RRNA GENITAL QL PROBE: NEGATIVE
HBV SURFACE AG SERPL QL IA: NEGATIVE
HCV AB SERPL QL IA: NEGATIVE
HIV 1+2 AB+HIV1 P24 AG SERPL QL IA: NEGATIVE
N GONORRHOEA DNA SPEC QL NAA+PROBE: NOT DETECTED
RPR SER QL: NORMAL
T VAGINALIS RRNA GENITAL QL PROBE: NEGATIVE

## 2017-09-15 ENCOUNTER — CLINICAL SUPPORT (OUTPATIENT)
Dept: REHABILITATION | Facility: OTHER | Age: 37
End: 2017-09-15
Attending: PHYSICAL MEDICINE & REHABILITATION
Payer: COMMERCIAL

## 2017-09-15 DIAGNOSIS — G89.29 CHRONIC RIGHT-SIDED LOW BACK PAIN WITH RIGHT-SIDED SCIATICA: ICD-10-CM

## 2017-09-15 DIAGNOSIS — M54.41 CHRONIC RIGHT-SIDED LOW BACK PAIN WITH RIGHT-SIDED SCIATICA: ICD-10-CM

## 2017-09-15 LAB
HSV1 IGG SERPL QL IA: NEGATIVE
HSV2 IGG SERPL QL IA: NEGATIVE

## 2017-09-15 PROCEDURE — 97110 THERAPEUTIC EXERCISES: CPT

## 2017-09-15 NOTE — PROGRESS NOTES
"[facility]      Name: Yanna Kwong  Clinic Number: [id number]  Date of Treatment: 09/15/2017   Diagnosis:   Encounter Diagnosis   Name Primary?    Chronic right-sided low back pain with right-sided sciatica      Physician: Shirin Krishna, *    Pain pattern determined: 1 PEN  Plan of care signed: 2017   Time in: 8:00  Time Out:9:00  Total Treatment time: 45  Precautions: Standard  Visit #: 6    POC due:10/14/2017  Reassessment due:2017, done 2017  Next reassessment due: 17    Pt has completed previous PT with [facility] and is now charged as commercial insurance.       Subjective   Yanna reports improvement of symptoms.  She states that she has no pain today. She is very pleased with her progress so far.  She has been doing her exercises more regularly.     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, light duty, sitting, occasionally going up and down stairs)    Leisure: Bowl, walking for exercise, Does "[employer]"                      Pts goals:  Reduce pain, learn how to manage your symptom     Objective     Baseline IM Testing Results:   Date of testin2017  ROM 48-0 deg   Max Peak Torque 82    Min Peak Torque 34    Flex/Ext Ratio 2.4   % below normative data -69      OUTCOMES:   From Initial Evaluation  FOTO: Focus on Therapeutic Outcomes   Category: lumbar   % Impaired: FOTO Not completed at time of eval  Current Score  = CJ = at least 20% but < 40% impaired, limited or restricted  Goal at Discharge Score = CJ = at least 20% but < 40% impaired, limited or restricted    Score interpretation is as follows:    TEST SCORE  Modifier  Impairment Limitation Restriction    050  CH  0 % impaired, limited or restricted   1-  CI  @ least 1% but less than 20% impaired, limited or restricted   10-  CJ  @ least 20%<40% impaired, limited or restricted "   20-29/50  CK  @ least 40%<60% impaired, limited or restricted   30-39/50  CL  @ least 60% <80% impaired, limited or restricted   40-49/50  CM  @ least 80%<100% impaired limited or restricted   50/50  CN  100% impaired, limited or restricted       Treatment    Pt was instructed in and performed the following:     Yanna received therapeutic exercises to develop/improved posture, cardiovascular endurance, muscular endurance, lumbar/cervical ROM, strength and muscular endurance for 45 minutes including the following exercises:     Sidelying clamshells 15x cuing to watch pelvic and engage core  Flexion in lying 10x,   Supine pelvic tilts 10   Modified Piriformis stretch 10 cuing for positioning hip  Bridges 10x  [facility] 9/15/2017   Visit Number 6   VAS Pain Rating 0   Treadmill Time (in min.) 10   Speed 2.6   Extension in Standing 10   Flexion in Lying 10   Lumbar Weight 49   Repetitions 20   Rating of Perceived Exertion 2   Ice - Z Lie (in min.) 10       Peripheral muscle strengthening which included 1 set of 15-20 repetitions at a slow, controlled 7 second per rep pace focused on strengthening supporting musculature for improved body mechanics and functional mobility.  Pt and therapist focused on proper form during treatment to ensure optimal strengthening of each targeted muscle group.  Machines were utilized including torso rotation, leg extension, leg curl, chest press, upright row. Tricep extension, bicep curl, leg press, and hip abduction added on third visit.     Home Exercise Program as follows:   Sidelying clamshells 15x, 3-4x weekly   Flexion in lying 10x, 3x/day  Supine pelvic tilts 5-10 s/h 5x, 2x daily   Z-lie 10-20 min, 2x daily  Modified Piriformis stretch 10s/h 3x/day  Bridges 10x, 3x/day  Standing extension 10x, 3x/day    Handouts were given to the patient. Pt demo good understanding of the education provided. Yanna demonstrated good return demonstration of activities.     Lumbar  roll use compliance: no  Additional exercises taught this treatment session:  Standing extensions and bridges.     Assessment     Patient tolerated tx well with no increases in symptoms throughout.Introduced standing extension and bridges with good response. Tolerating stretches well with min cuing needed.   Reviewed HEP with patient. Patient performed lumbar med ex and all peripheral resistance machines with no increases in symptoms.  She reported low RPE on med-x lumbar machine so resistance can be increased 10% next visit. Patient finished with ice in z-lie position.  Patient reported no symptoms upon leaving.     Patient is making good progress towards established goals.  Pt will continue to benefit from skilled outpatient physical therapy to address the deficits stated in the impairment chart, provide pt/family education and to maximize pt's level of independence in the home and community environment.       Pt's spiritual, cultural and educational needs considered and pt agreeable to plan of care and goals as stated below:     Medical necessity is demonstrated by the following IMPAIRMENTS/PROBLEMS:    Pt presents with the following impairments:   History  Co-morbidities and personal factors that may impact the plan of care   Examination  Body Structures and Functions, activity limitations and participation restrictions that may impact the plan of care Clinical Presentation Decision Making/ Complexity Score   Co-morbidities:   Standard           Personal Factors:   lifestyle  character   Body Regions:   back  lower extremities     Body Systems:   gross symmetry  ROM  strength  gross coordinated movement  transitions     Activity limitations:   Learning and applying knowledge  no deficits     General Tasks and Commands  no deficits     Communication  no deficits     Mobility  lifting and carrying objects  walking  driving (bike, car, motorcycle)  Standing     Self care  washing oneself (bathing, drying, washing  hands)  caring for body parts (brushing teeth, shaving, grooming)  dressing     Domestic Life  doing house work (cleaning house, washing dishes, laundry)     Interactions/Relationships  no deficits     Life Areas  employment     Community and Social Life  community life  recreation and leisure     Participation Restrictions:   Increased pain with prolonged sitting needed for work. Also limited ability to perform exercise.     stable and uncomplicated    Low      Goals:   Short term goals:  6 weeks or 10 visits   1.  Pt will demonstrate increased lumbar ROM by at least 3 degrees from the initial ROM value with improvements noted in functional ROM and ability to perform ADLs. (progressing, not met)  2.  Pt will demonstrate increased maximum isometric torque value by 10% when compared to the initial value resulting in improved ability to perform bending, lifting, and carrying activities safely, confidently.  (progressing, not met)  3.  Patient report a reduction in worst pain score by 1-2 points for improved tolerance during work and recreational activities.  (progressing, not met)  4.  Pt able to perform HEP correctly with minimal cueing or supervision for therapist.  (progressing, not met)     Long term goals: 13 weeks or 20 visits   1. Pt will demonstrate increased lumbar ROM by at least 6 degrees from initial ROM value, resulting in improved ability to perform functional fwd bending while standing and sitting.  (progressing, not met)  2. Pt will demonstrate increased maximum isometric torque value by 20% when compared to the initial value resulting in improved ability to perform bending, lifting, and carrying activities safely, confidently.  (progressing, not met)  3. Pt to demonstrate ability to independently control and reduce their pain through posture positioning and mechanical movements throughout a typical day.  (progressing, not met)  4.  Patient will demonstrate improved overall function per FOTO Survey to  CJ = at least 20% but < 40% impaired, limited or restricted score or less.  (progressing, not met)         Plan   Continue with established Plan of Care towards established PT goals.

## 2017-09-19 ENCOUNTER — CLINICAL SUPPORT (OUTPATIENT)
Dept: REHABILITATION | Facility: OTHER | Age: 37
End: 2017-09-19
Attending: PHYSICAL MEDICINE & REHABILITATION
Payer: COMMERCIAL

## 2017-09-19 DIAGNOSIS — M51.36 DDD (DEGENERATIVE DISC DISEASE), LUMBAR: Primary | ICD-10-CM

## 2017-09-19 NOTE — PROGRESS NOTES
"[facility]      Name: Yanna Kwong  Clinic Number: [id number]  Date of Treatment: 2017   Diagnosis:   Encounter Diagnosis   Name Primary?    DDD (degenerative disc disease), lumbar Yes     Physician: Shirin Krishna, *    Pain pattern determined: 1 PEN  Plan of care signed: 2017   Time in: 3:10  Time Out:4:10  Total Treatment time: 45  Precautions: Standard  Visit #: 7    POC due:10/14/2017  Reassessment due:2017, done 2017  Next reassessment due: 17    Pt has completed previous PT with [facility] and is now charged as commercial insurance.   Face to Face discussion of patient was done between PT and PTA.       Subjective   Yanna reports improvement of symptoms.  She states that she has no pain LB today, but a little discomfort.  She is very pleased with her progress so far.  She has been doing her exercises more regularly.     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, light duty, sitting, occasionally going up and down stairs)    Leisure: Bowl, walking for exercise, Does "[employer]"                      Pts goals:  Reduce pain, learn how to manage your symptom     Objective     Baseline IM Testing Results:   Date of testin2017  ROM 48-0 deg   Max Peak Torque 82    Min Peak Torque 34    Flex/Ext Ratio 2.4   % below normative data -69      OUTCOMES:   From Initial Evaluation  FOTO: Focus on Therapeutic Outcomes   Category: lumbar   % Impaired: FOTO Not completed at time of eval  Current Score  = CJ = at least 20% but < 40% impaired, limited or restricted  Goal at Discharge Score = CJ = at least 20% but < 40% impaired, limited or restricted    Score interpretation is as follows:    TEST SCORE  Modifier  Impairment Limitation Restriction    0/50  CH  0 % impaired, limited or restricted   1-9/50  CI  @ least 1% but less than 20% impaired, limited or " restricted   10-19/50  CJ  @ least 20%<40% impaired, limited or restricted   20-29/50  CK  @ least 40%<60% impaired, limited or restricted   30-39/50  CL  @ least 60% <80% impaired, limited or restricted   40-49/50  CM  @ least 80%<100% impaired limited or restricted   50/50  CN  100% impaired, limited or restricted       Treatment    Pt was instructed in and performed the following:     Yanna received therapeutic exercises to develop/improved posture, cardiovascular endurance, muscular endurance, lumbar/cervical ROM, strength and muscular endurance for 45 minutes including the following exercises:   [facility] 9/19/2017   Visit Number 7   VAS Pain Rating 0   Treadmill Time (in min.) -No warmup due to pt late   Speed -   Extension in Standing 10   Flexion in Lying 10   Lumbar Weight 52   Repetitions 20   Rating of Perceived Exertion 3   Ice - Z Lie (in min.) 10       Sidelying clamshells 15x cuing to watch pelvic and engage core  Flexion in lying 10x,   Supine pelvic tilts 10   Modified Piriformis stretch 10 cuing for positioning hip  Bridges 10x    Peripheral muscle strengthening which included 1 set of 15-20 repetitions at a slow, controlled 7 second per rep pace focused on strengthening supporting musculature for improved body mechanics and functional mobility.  Pt and therapist focused on proper form during treatment to ensure optimal strengthening of each targeted muscle group.  Machines were utilized including torso rotation, leg extension, leg curl, chest press, upright row. Tricep extension, bicep curl, leg press, and hip abduction added on third visit.     Home Exercise Program as follows:   Sidelying clamshells 15x, 3-4x weekly   Flexion in lying 10x, 3x/day  Supine pelvic tilts 5-10 s/h 5x, 2x daily   Z-lie 10-20 min, 2x daily  Modified Piriformis stretch 10s/h 3x/day  Bridges 10x, 3x/day  Standing extension 10x, 3x/day    Handouts were given to the patient. Pt demo good understanding of the  education provided. Yanna demonstrated good return demonstration of activities.     Lumbar roll use compliance: no  Additional exercises taught this treatment session:  Standing extensions and bridges.     Assessment     Patient tolerated tx well with no increases in symptoms throughout.Tolerating stretches well with min cuing needed.   Reviewed HEP with patient. Patient performed lumbar med ex and all peripheral resistance machines with no increases in symptoms.   Patient finished with ice in z-lie position.  Patient reported no symptoms upon leaving.     Patient is making good progress towards established goals.  Pt will continue to benefit from skilled outpatient physical therapy to address the deficits stated in the impairment chart, provide pt/family education and to maximize pt's level of independence in the home and community environment.       Pt's spiritual, cultural and educational needs considered and pt agreeable to plan of care and goals as stated below:     Medical necessity is demonstrated by the following IMPAIRMENTS/PROBLEMS:    Pt presents with the following impairments:   History  Co-morbidities and personal factors that may impact the plan of care   Examination  Body Structures and Functions, activity limitations and participation restrictions that may impact the plan of care Clinical Presentation Decision Making/ Complexity Score   Co-morbidities:   Standard           Personal Factors:   lifestyle  character   Body Regions:   back  lower extremities     Body Systems:   gross symmetry  ROM  strength  gross coordinated movement  transitions     Activity limitations:   Learning and applying knowledge  no deficits     General Tasks and Commands  no deficits     Communication  no deficits     Mobility  lifting and carrying objects  walking  driving (bike, car, motorcycle)  Standing     Self care  washing oneself (bathing, drying, washing hands)  caring for body parts (brushing teeth, shaving,  grooming)  dressing     Domestic Life  doing house work (cleaning house, washing dishes, laundry)     Interactions/Relationships  no deficits     Life Areas  employment     Community and Social Life  community life  recreation and leisure     Participation Restrictions:   Increased pain with prolonged sitting needed for work. Also limited ability to perform exercise.     stable and uncomplicated    Low      Goals:   Short term goals:  6 weeks or 10 visits   1.  Pt will demonstrate increased lumbar ROM by at least 3 degrees from the initial ROM value with improvements noted in functional ROM and ability to perform ADLs. (progressing, not met)  2.  Pt will demonstrate increased maximum isometric torque value by 10% when compared to the initial value resulting in improved ability to perform bending, lifting, and carrying activities safely, confidently.  (progressing, not met)  3.  Patient report a reduction in worst pain score by 1-2 points for improved tolerance during work and recreational activities.  (progressing, not met)  4.  Pt able to perform HEP correctly with minimal cueing or supervision for therapist.  (progressing, not met)     Long term goals: 13 weeks or 20 visits   1. Pt will demonstrate increased lumbar ROM by at least 6 degrees from initial ROM value, resulting in improved ability to perform functional fwd bending while standing and sitting.  (progressing, not met)  2. Pt will demonstrate increased maximum isometric torque value by 20% when compared to the initial value resulting in improved ability to perform bending, lifting, and carrying activities safely, confidently.  (progressing, not met)  3. Pt to demonstrate ability to independently control and reduce their pain through posture positioning and mechanical movements throughout a typical day.  (progressing, not met)  4.  Patient will demonstrate improved overall function per FOTO Survey to CJ = at least 20% but < 40% impaired, limited or  restricted score or less.  (progressing, not met)         Plan   Continue with established Plan of Care towards established PT goals.

## 2017-09-27 ENCOUNTER — CLINICAL SUPPORT (OUTPATIENT)
Dept: REHABILITATION | Facility: OTHER | Age: 37
End: 2017-09-27
Attending: PHYSICAL MEDICINE & REHABILITATION
Payer: COMMERCIAL

## 2017-09-27 DIAGNOSIS — M54.41 CHRONIC RIGHT-SIDED LOW BACK PAIN WITH RIGHT-SIDED SCIATICA: ICD-10-CM

## 2017-09-27 DIAGNOSIS — G89.29 CHRONIC RIGHT-SIDED LOW BACK PAIN WITH RIGHT-SIDED SCIATICA: ICD-10-CM

## 2017-09-27 PROCEDURE — 97110 THERAPEUTIC EXERCISES: CPT | Performed by: PHYSICAL THERAPIST

## 2017-09-27 NOTE — PROGRESS NOTES
"[facility] Treatment      Name: Yanna Kwong  Clinic Number: [id number]  Date of Treatment: 2017   Diagnosis:   Encounter Diagnosis   Name Primary?    Chronic right-sided low back pain with right-sided sciatica      Physician: Shirin Krishna, *    Pain pattern determined: 1 PEN  Plan of care signed: 2017   Time in: 420 (pt  Late 5 min TEAGUE only)  Time Out:515  Total Treatment time: 45  Precautions: Standard  Visit #: 8    POC due:10/14/2017  Reassessment due:2017, done 2017  Next reassessment due: 17, done 17  Next 10/27/17    Pt has completed previous PT with [facility] and is now charged as commercial insurance.   Face to Face discussion of patient was done between PT and PTA.       Subjective   Yanna reports improvement of symptoms.  Lower back pain at 1/10 today.  She states that she has no pain LB today, but a little discomfort.  She is very pleased with her progress so far.  She has been doing her exercises more regularly.     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, light duty, sitting, occasionally going up and down stairs)    Leisure: Bowl, walking for exercise, Does "[employer]"                      Pts goals:  Reduce pain, learn how to manage your symptom     Objective     Baseline IM Testing Results:   Date of testin2017  ROM 48-0 deg   Max Peak Torque 82    Min Peak Torque 34    Flex/Ext Ratio 2.4   % below normative data -69      OUTCOMES:   From Initial Evaluation  FOTO: Focus on Therapeutic Outcomes   Category: lumbar   % Impaired: FOTO Not completed at time of eval  Current Score  = CJ = at least 20% but < 40% impaired, limited or restricted  Goal at Discharge Score = CJ = at least 20% but < 40% impaired, limited or restricted    Score interpretation is as follows:    TEST SCORE  Modifier  Impairment Limitation Restriction    0/50  CH  " 0 % impaired, limited or restricted   1-9/50  CI  @ least 1% but less than 20% impaired, limited or restricted   10-19/50  CJ  @ least 20%<40% impaired, limited or restricted   20-29/50  CK  @ least 40%<60% impaired, limited or restricted   30-39/50  CL  @ least 60% <80% impaired, limited or restricted   40-49/50  CM  @ least 80%<100% impaired limited or restricted   50/50  CN  100% impaired, limited or restricted       Treatment    Pt was instructed in and performed the following:     Yanna received therapeutic exercises to develop/improved posture, cardiovascular endurance, muscular endurance, lumbar ROM, strength and muscular endurance for 45 minutes including the following exercises:       [facility] 9/27/2017   Visit Number 8   VAS Pain Rating 1   Treadmill Time (in min.) 5   Speed 2.6   Extension in Standing 10   Flexion in Lying 10   Lumbar Weight 55   Repetitions 18   Rating of Perceived Exertion 3   Ice - Z Lie (in min.) 10           Sidelying clamshells 15x cuing to watch pelvic and engage core  Flexion in lying 10x,   Supine pelvic tilts 10   Modified Piriformis stretch 10 cuing for positioning hip  Bridges 10x    Peripheral muscle strengthening which included 1 set of 15-20 repetitions at a slow, controlled 7 second per rep pace focused on strengthening supporting musculature for improved body mechanics and functional mobility.  Pt and therapist focused on proper form during treatment to ensure optimal strengthening of each targeted muscle group.  Machines were utilized including torso rotation, leg extension, leg curl, chest press, upright row. Tricep extension, bicep curl, leg press, and hip abduction.    Home Exercise Program as follows:   Sidelying clamshells 15x, 3-4x weekly   Flexion in lying 10x, 3x/day  Supine pelvic tilts 5-10 s/h 5x, 2x daily   Z-lie 10-20 min, 2x daily  Modified Piriformis stretch 10s/h 3x/day  Bridges 10x, 3x/day  Standing extension 10x, 3x/day    Handouts were  given to the patient. Pt demo good understanding of the education provided. Yanna demonstrated good return demonstration of activities.     Lumbar roll use compliance: no  Additional exercises taught this treatment session:  Standing extensions and bridges.     Assessment     Patient tolerated tx well with no increases in symptoms throughout.Tolerating stretches well with min cuing needed. Reviewed alternative positions for piriformis stretching and flexion in sitting if unable to do lying down.   Reviewed HEP with patient. Patient performed lumbar med ex and all peripheral resistance machines with no increases in symptoms.   Patient finished with ice in z-lie position.  Patient reported no symptoms upon leaving.     Patient is making good progress towards established goals.  Pt will continue to benefit from skilled outpatient physical therapy to address the deficits stated in the impairment chart, provide pt/family education and to maximize pt's level of independence in the home and community environment.       Pt's spiritual, cultural and educational needs considered and pt agreeable to plan of care and goals as stated below:     Medical necessity is demonstrated by the following IMPAIRMENTS/PROBLEMS:    Pt presents with the following impairments:   History  Co-morbidities and personal factors that may impact the plan of care   Examination  Body Structures and Functions, activity limitations and participation restrictions that may impact the plan of care Clinical Presentation Decision Making/ Complexity Score   Co-morbidities:   Standard           Personal Factors:   lifestyle  character   Body Regions:   back  lower extremities     Body Systems:   gross symmetry  ROM  strength  gross coordinated movement  transitions     Activity limitations:   Learning and applying knowledge  no deficits     General Tasks and Commands  no deficits     Communication  no deficits     Mobility  lifting and carrying  objects  walking  driving (bike, car, motorcycle)  Standing     Self care  washing oneself (bathing, drying, washing hands)  caring for body parts (brushing teeth, shaving, grooming)  dressing     Domestic Life  doing house work (cleaning house, washing dishes, laundry)     Interactions/Relationships  no deficits     Life Areas  employment     Community and Social Life  community life  recreation and leisure     Participation Restrictions:   Increased pain with prolonged sitting needed for work. Also limited ability to perform exercise.     stable and uncomplicated    Low      Goals:   Short term goals:  6 weeks or 10 visits   1.  Pt will demonstrate increased lumbar ROM by at least 3 degrees from the initial ROM value with improvements noted in functional ROM and ability to perform ADLs. (progressing, not met)  2.  Pt will demonstrate increased maximum isometric torque value by 10% when compared to the initial value resulting in improved ability to perform bending, lifting, and carrying activities safely, confidently.  (progressing, not met)  3.  Patient report a reduction in worst pain score by 1-2 points for improved tolerance during work and recreational activities.  (progressing, not met)  4.  Pt able to perform HEP correctly with minimal cueing or supervision for therapist.  (progressing, not met)     Long term goals: 13 weeks or 20 visits   1. Pt will demonstrate increased lumbar ROM by at least 6 degrees from initial ROM value, resulting in improved ability to perform functional fwd bending while standing and sitting.  (progressing, not met)  2. Pt will demonstrate increased maximum isometric torque value by 20% when compared to the initial value resulting in improved ability to perform bending, lifting, and carrying activities safely, confidently.  (progressing, not met)  3. Pt to demonstrate ability to independently control and reduce their pain through posture positioning and mechanical movements  throughout a typical day.  (progressing, not met)  4.  Patient will demonstrate improved overall function per FOTO Survey to CJ = at least 20% but < 40% impaired, limited or restricted score or less.  (progressing, not met)         Plan   Continue with established Plan of Care towards established PT goals.

## 2017-10-27 ENCOUNTER — PATIENT MESSAGE (OUTPATIENT)
Dept: OBSTETRICS AND GYNECOLOGY | Facility: CLINIC | Age: 37
End: 2017-10-27

## 2017-10-30 NOTE — TELEPHONE ENCOUNTER
From: Obi Kwong     Sent: 10/27/2017  7:02 PM CDT       To: Elli Irwin MD  Subject: [facility]gent [facility]    Good evening Dr. Irwin,    I recently noticed that I was having a little pain when wiping/ cleaning my vagina.  I looked a frw days ago, thinking maybe I pulled a hair and that was causing the pain. I didn't see anything.     I looked again a moment ago and it looks like there's an ulcer to the left of my clitoris. There's no pain when urinating, but there is some irritation when sitting/standing now and of couse when wiping/cleaning myself.    I'm not sure if I scratched myself while cleaning or bathing,  but it's slightly uncomfortable now.     I'm not sure what to do about this.  Any advice? Do I need to come in?     Please advise as soon as possible.    Thanks so much.    From: Med Assistant Low  Sent: 10/30/17 11:11 AM  To: Obi Kwong  Subject: RE: [facility]gent Medical     is out of the office today for a family  and will be off tomorrow.  Is there any way you can send an attached picture that I would be able to forward to her?    To: NIRMAL WREN STAFF      From: Yanna Kwong      Created: 10/30/2017 11:26 AM        Sure... I'll see if i can get that over asap.

## 2017-10-31 NOTE — TELEPHONE ENCOUNTER
Looks like just a little inflamed area probably from rubbing etc. Put Bacitracin on it x 3-7 days and if not better needs appt

## 2017-12-11 ENCOUNTER — PATIENT MESSAGE (OUTPATIENT)
Dept: OBSTETRICS AND GYNECOLOGY | Facility: CLINIC | Age: 37
End: 2017-12-11

## 2017-12-11 ENCOUNTER — PATIENT MESSAGE (OUTPATIENT)
Dept: INTERNAL MEDICINE | Facility: CLINIC | Age: 37
End: 2017-12-11

## 2017-12-20 ENCOUNTER — DOCUMENTATION ONLY (OUTPATIENT)
Dept: REHABILITATION | Facility: OTHER | Age: 37
End: 2017-12-20

## 2017-12-20 NOTE — PROGRESS NOTES
DC NOTE FOR [facility] PT    Pt is an [facility] Employee Pt who completed 8 visits from 7/13/17 to 9/27/17.  Treatments consisted of stretching and strengthenign exercises for the lower back.  Goals of PT not met.  Pt did not return for any further visits or care.  DC from [facility] PT as pt did not return for remaining 12 visits.

## 2017-12-20 NOTE — PROGRESS NOTES
Pt arrived over 15' late to 3:30p appointment to [facility] for reassessment. Pt has not been to the program since late September and because there was an appointment coming in at 4:00p that is not enough time to complete a proper through reassessment. Pt was informed that she would not be able to be seen today and will be seen at her next scheduled appointment by a PT.

## 2017-12-27 ENCOUNTER — CLINICAL SUPPORT (OUTPATIENT)
Dept: REHABILITATION | Facility: OTHER | Age: 37
End: 2017-12-27
Attending: PHYSICAL MEDICINE & REHABILITATION
Payer: COMMERCIAL

## 2017-12-27 DIAGNOSIS — G89.29 CHRONIC RIGHT-SIDED LOW BACK PAIN WITH RIGHT-SIDED SCIATICA: ICD-10-CM

## 2017-12-27 DIAGNOSIS — M54.41 CHRONIC RIGHT-SIDED LOW BACK PAIN WITH RIGHT-SIDED SCIATICA: ICD-10-CM

## 2017-12-27 PROCEDURE — 97110 THERAPEUTIC EXERCISES: CPT

## 2017-12-27 NOTE — PLAN OF CARE
"[facility]      Name: Yanna Kwong  Clinic Number: [id number]  Date of Treatment: 2017   Diagnosis:   Encounter Diagnosis   Name Primary?    Chronic right-sided low back pain with right-sided sciatica      Physician: Shirin Krishna, *    Pain pattern determined: 1 PEN  Plan of care signed: 2017   Time in: 3:05 (pt  Late 5 min TEAGUE only)  Time Out: 4:00  Total Treatment time: 55  Precautions: Standard  Visit #: 9    POC due: 2018  Reassessment due: 2018    Pt has completed previous PT with [facility] and is now charged as commercial insurance.       Subjective   Yanna has not been to [facility] in several months. States that at this time she has R glute and R calf pain but it does not feel like it is radiating, it feels like it is in distinct spots. She has been keeping up her exercises and feels that her biggest setback is the chair she sits in is uncomfortable even with the lumbar roll.     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[employer], light duty, sitting, occasionally going up and down stairs)    Leisure: Bowl, walking for exercise, Does "[employer]"                      Pts goals:  Reduce pain, learn how to manage your symptom     Objective     MOVEMENT LOSS 17    ROM Loss   Flexion Moderate loss, compensates by bending knees   Extension minimal loss   Side bending Right WNL   Side bending Left WNL   Rotation Right minimal loss   Rotation Left minimal loss        Baseline IM Testing Results:   Date of testin2017  ROM 48-0 deg   Max Peak Torque 82    Min Peak Torque 34    Flex/Ext Ratio 2.4   % below normative data -69      OUTCOMES:   From Initial Evaluation  FOTO: Focus on Therapeutic Outcomes   Category: lumbar   % Impaired: FOTO Not completed at time of eval  Current Score  = CJ = at least 20% but < 40% impaired, limited or restricted  Goal at " "Discharge Score = CJ = at least 20% but < 40% impaired, limited or restricted    Treatment    Pt was instructed in and performed the following:     Yanna received therapeutic exercises to develop/improved posture, cardiovascular endurance, muscular endurance, lumbar ROM, strength and muscular endurance for 45 minutes including the following exercises:       [facility] 12/27/2017   Visit Number 9   VAS Pain Rating 1   Treadmill Time (in min.) 5   Speed 2   Extension in Standing 10   Flexion in Lying 10   Manual Therapy 5   Lumbar Flexion 60   Lumbar Extension 0   Lumbar Weight 55   Repetitions 15   Rating of Perceived Exertion 4   Ice - Z Lie (in min.) 10         Sidelying clamshells 15x cuing to watch pelvic and engage core  Flexion in lying 10x,   Supine pelvic tilts 10   Modified Piriformis stretch 10 cuing for positioning hip  Bridges 10x  Piriformis Foam Roll 1'    Manual therapy 5" lumbar gapping bilateral with Grade IV with cavitation in L side lying    Peripheral muscle strengthening which included 1 set of 15-20 repetitions at a slow, controlled 7 second per rep pace focused on strengthening supporting musculature for improved body mechanics and functional mobility.  Pt and therapist focused on proper form during treatment to ensure optimal strengthening of each targeted muscle group.  Machines were utilized including torso rotation, leg extension, leg curl, chest press, upright row. Tricep extension, bicep curl, leg press, and hip abduction.    Home Exercise Program as follows:   Sidelying clamshells 15x, 3-4x weekly   Flexion in lying 10x, 3x/day  Supine pelvic tilts 5-10 s/h 5x, 2x daily   Z-lie 10-20 min, 2x daily  Modified Piriformis stretch 10s/h 3x/day  Bridges 10x, 3x/day  Standing extension 10x, 3x/day    Handouts were given to the patient. Pt demo good understanding of the education provided. Yanna demonstrated good return demonstration of activities.     Lumbar roll use compliance: " no  Additional exercises taught this treatment session:  Standing extensions and bridges.     Assessment     Patient tolerated tx well with no increases in symptoms throughout. Pt reports some relief in glute discomfort with foal roll to glute/piriformis however she continue to c/o calf pain. Pt was able to remain at the same weight on the Lumbar MedX and able to increase Rom. Pt will be tested next visit.    Patient is making good progress towards established goals.  Pt will continue to benefit from skilled outpatient physical therapy to address the deficits stated in the impairment chart, provide pt/family education and to maximize pt's level of independence in the home and community environment.       Pt's spiritual, cultural and educational needs considered and pt agreeable to plan of care and goals as stated below:     Medical necessity is demonstrated by the following IMPAIRMENTS/PROBLEMS:    Pt presents with the following impairments:   History  Co-morbidities and personal factors that may impact the plan of care   Examination  Body Structures and Functions, activity limitations and participation restrictions that may impact the plan of care Clinical Presentation Decision Making/ Complexity Score   Co-morbidities:   Standard           Personal Factors:   lifestyle  character   Body Regions:   back  lower extremities     Body Systems:   gross symmetry  ROM  strength  gross coordinated movement  transitions     Activity limitations:   Learning and applying knowledge  no deficits     General Tasks and Commands  no deficits     Communication  no deficits     Mobility  lifting and carrying objects  walking  driving (bike, car, motorcycle)  Standing     Self care  washing oneself (bathing, drying, washing hands)  caring for body parts (brushing teeth, shaving, grooming)  dressing     Domestic Life  doing house work (cleaning house, washing dishes, laundry)     Interactions/Relationships  no deficits     Life  Areas  employment     Community and Social Life  community life  recreation and leisure     Participation Restrictions:   Increased pain with prolonged sitting needed for work. Also limited ability to perform exercise.     stable and uncomplicated    Low      Goals:   Short term goals:  6 weeks or 10 visits   1.  Pt will demonstrate increased lumbar ROM by at least 3 degrees from the initial ROM value with improvements noted in functional ROM and ability to perform ADLs. (progressing, not met)  2.  Pt will demonstrate increased maximum isometric torque value by 10% when compared to the initial value resulting in improved ability to perform bending, lifting, and carrying activities safely, confidently.  (progressing, not met)  3.  Patient report a reduction in worst pain score by 1-2 points for improved tolerance during work and recreational activities.  (progressing, not met)  4.  Pt able to perform HEP correctly with minimal cueing or supervision for therapist.  (progressing, not met)     Long term goals: 13 weeks or 20 visits   1. Pt will demonstrate increased lumbar ROM by at least 6 degrees from initial ROM value, resulting in improved ability to perform functional fwd bending while standing and sitting.  (progressing, not met)  2. Pt will demonstrate increased maximum isometric torque value by 20% when compared to the initial value resulting in improved ability to perform bending, lifting, and carrying activities safely, confidently.  (progressing, not met)  3. Pt to demonstrate ability to independently control and reduce their pain through posture positioning and mechanical movements throughout a typical day.  (progressing, not met)  4.  Patient will demonstrate improved overall function per FOTO Survey to CJ = at least 20% but < 40% impaired, limited or restricted score or less.  (progressing, not met)         Plan   Continue with established Plan of Care towards established PT goals.

## 2018-01-04 ENCOUNTER — CLINICAL SUPPORT (OUTPATIENT)
Dept: REHABILITATION | Facility: OTHER | Age: 38
End: 2018-01-04
Attending: PHYSICAL MEDICINE & REHABILITATION
Payer: COMMERCIAL

## 2018-01-04 DIAGNOSIS — M54.41 CHRONIC RIGHT-SIDED LOW BACK PAIN WITH RIGHT-SIDED SCIATICA: ICD-10-CM

## 2018-01-04 DIAGNOSIS — G89.29 CHRONIC RIGHT-SIDED LOW BACK PAIN WITH RIGHT-SIDED SCIATICA: ICD-10-CM

## 2018-01-04 PROCEDURE — 97110 THERAPEUTIC EXERCISES: CPT

## 2018-01-04 NOTE — PROGRESS NOTES
"[facility] Treatment      Name: Yanna Kwong  Clinic Number: [id number]  Date of Treatment: 2018   Diagnosis:   Encounter Diagnosis   Name Primary?    Chronic right-sided low back pain with right-sided sciatica      Physician: Shirin Krishna, *    Pain pattern determined: 1 PEN  Plan of care signed: 2017   Time in: 8:10am  Time Out:920am  Total Treatment time: 70 min  Precautions: Standard  Visit #: 10    POC due:10/14/2017  Reassessment due:2017, done 2017  Next reassessment due: 17, done 17, done 17(POC sent to MD)  Next 18    Pt has completed previous PT with [facility] and is now charged as commercial insurance.   Face to Face discussion of patient was done between PT and PTA.       Subjective   Yanna reports improvement of symptoms.  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, light duty, sitting, occasionally going up and down stairs)    Leisure: Bowl, walking for exercise, Does "[employer]"                      Pts goals:  Reduce pain, learn how to manage your symptom     Objective     Baseline IM Testing Results:   Date of testin2017  ROM 48-0 deg   Max Peak Torque 82    Min Peak Torque 34    Flex/Ext Ratio 2.4   % below normative data -69      Date of testin2017  ROM 60-0 deg   Max Peak Torque 125   Min Peak Torque 56   Flex/Ext Ratio 2.3   % below normative data --46      OUTCOMES:   From Initial Evaluation  FOTO: Focus on Therapeutic Outcomes   Category: lumbar   % Impaired: FOTO Not completed at time of eval  Current Score  = CJ = at least 20% but < 40% impaired, limited or restricted  Goal at Discharge Score = CJ = at least 20% but < 40% impaired, limited or restricted    Score interpretation is as follows:    TEST SCORE  Modifier  Impairment " Limitation Restriction    0/50  CH  0 % impaired, limited or restricted   1-9/50  CI  @ least 1% but less than 20% impaired, limited or restricted   10-19/50  CJ  @ least 20%<40% impaired, limited or restricted   20-29/50  CK  @ least 40%<60% impaired, limited or restricted   30-39/50  CL  @ least 60% <80% impaired, limited or restricted   40-49/50  CM  @ least 80%<100% impaired limited or restricted   50/50  CN  100% impaired, limited or restricted       Treatment    Pt was instructed in and performed the following:     Yanna received therapeutic exercises to develop/improved posture, cardiovascular endurance, muscular endurance, lumbar ROM, strength and muscular endurance for 50 minutes including the following exercises:   [facility] 1/4/2018   Visit Number 10   VAS Pain Rating 2   Treadmill Time (in min.) 10   Speed 2   Extension in Standing 10   Flexion in Lying 10   Manual Therapy 5   Lumbar Flexion 60   Lumbar Extension 0   Lumbar Peak Torque 125   Min Torque 56   Lumbar Weight 55   Repetitions 20   Rating of Perceived Exertion 3.5   Ice - Z Lie (in min.) 10       Manual: prone piriformis release 5min    Sidelying clamshells 15x cuing to watch pelvic and engage core  Flexion in lying 10x,   Supine pelvic tilts 10   Modified Piriformis stretch 10 cuing for positioning hip  Bridges 10x    Peripheral muscle strengthening which included 1 set of 15-20 repetitions at a slow, controlled 7 second per rep pace focused on strengthening supporting musculature for improved body mechanics and functional mobility.  Pt and therapist focused on proper form during treatment to ensure optimal strengthening of each targeted muscle group.  Machines were utilized including torso rotation, leg extension, leg curl, chest press, upright row. Tricep extension, bicep curl, leg press, and hip abduction.    Home Exercise Program as follows:   Sidelying clamshells 15x, 3-4x weekly   Flexion in lying 10x, 3x/day  Supine pelvic  tilts 5-10 s/h 5x, 2x daily   Z-lie 10-20 min, 2x daily  Modified Piriformis stretch 10s/h 3x/day  Bridges 10x, 3x/day  Standing extension 10x, 3x/day    Handouts were given to the patient. Pt demo good understanding of the education provided. Yanna demonstrated good return demonstration of activities.     Lumbar roll use compliance: no  Additional exercises taught this treatment session:  Added figure four stretches    Assessment   Patient has attended 10 visits at [facility] which included MD evaluation, PT evaluation with isometric testing, and physical therapy treatment including HEP instruction, education, aerobic work, dynamic strengthening on med ex equipment for the spine, and whole body strengthening on med ex equipment with increasing weight loads.  Patient  is demonstrating increased ability to reduce symptoms, improved posture, improved   ROM, and improved   strength on med ex test by 34%   Average. Max peak torque increased from  82 to 125 ft/lbs and pt improved to -46% below norm. Pt is making good progress with treatment.    Pt will continue to benefit from skilled outpatient physical therapy to address the deficits stated in the impairment chart, provide pt/family education and to maximize pt's level of independence in the home and community environment.       Pt's spiritual, cultural and educational needs considered and pt agreeable to plan of care and goals as stated below:     Medical necessity is demonstrated by the following IMPAIRMENTS/PROBLEMS:    Pt presents with the following impairments:   History  Co-morbidities and personal factors that may impact the plan of care   Examination  Body Structures and Functions, activity limitations and participation restrictions that may impact the plan of care Clinical Presentation Decision Making/ Complexity Score   Co-morbidities:   Standard           Personal Factors:   lifestyle  character   Body Regions:   back  lower extremities     Body  Systems:   gross symmetry  ROM  strength  gross coordinated movement  transitions     Activity limitations:   Learning and applying knowledge  no deficits     General Tasks and Commands  no deficits     Communication  no deficits     Mobility  lifting and carrying objects  walking  driving (bike, car, motorcycle)  Standing     Self care  washing oneself (bathing, drying, washing hands)  caring for body parts (brushing teeth, shaving, grooming)  dressing     Domestic Life  doing house work (cleaning house, washing dishes, laundry)     Interactions/Relationships  no deficits     Life Areas  employment     Community and Social Life  community life  recreation and leisure     Participation Restrictions:   Increased pain with prolonged sitting needed for work. Also limited ability to perform exercise.     stable and uncomplicated    Low      Goals:   Short term goals:  6 weeks or 10 visits   1.  Pt will demonstrate increased lumbar ROM by at least 3 degrees from the initial ROM value with improvements noted in functional ROM and ability to perform ADLs.MET  2.  Pt will demonstrate increased maximum isometric torque value by 10% when compared to the initial value resulting in improved ability to perform bending, lifting, and carrying activities safely, confidently.  (MET)  3.  Patient report a reduction in worst pain score by 1-2 points for improved tolerance during work and recreational activities.  (progressing, not met)  4.  Pt able to perform HEP correctly with minimal cueing or supervision for therapist.  (MET)     Long term goals: 13 weeks or 20 visits   1. Pt will demonstrate increased lumbar ROM by at least 6 degrees from initial ROM value, resulting in improved ability to perform functional fwd bending while standing and sitting.  (progressing, not met)  2. Pt will demonstrate increased maximum isometric torque value by 20% when compared to the initial value resulting in improved ability to perform bending,  lifting, and carrying activities safely, confidently.  (progressing, not met)  3. Pt to demonstrate ability to independently control and reduce their pain through posture positioning and mechanical movements throughout a typical day.  (progressing, not met)  4.  Patient will demonstrate improved overall function per FOTO Survey to CJ = at least 20% but < 40% impaired, limited or restricted score or less.  (progressing, not met)         Plan   Continue with POC

## 2018-01-08 ENCOUNTER — CLINICAL SUPPORT (OUTPATIENT)
Dept: REHABILITATION | Facility: OTHER | Age: 38
End: 2018-01-08
Attending: PHYSICAL MEDICINE & REHABILITATION
Payer: COMMERCIAL

## 2018-01-08 DIAGNOSIS — M54.41 CHRONIC RIGHT-SIDED LOW BACK PAIN WITH RIGHT-SIDED SCIATICA: ICD-10-CM

## 2018-01-08 DIAGNOSIS — G89.29 CHRONIC RIGHT-SIDED LOW BACK PAIN WITH RIGHT-SIDED SCIATICA: ICD-10-CM

## 2018-01-08 PROCEDURE — 97110 THERAPEUTIC EXERCISES: CPT

## 2018-01-08 NOTE — PROGRESS NOTES
"[facility] Treatment      Name: Yanna Kwong  Clinic Number: [id number]  Date of Treatment: 2018   Diagnosis:   Encounter Diagnosis   Name Primary?    Chronic right-sided low back pain with right-sided sciatica      Physician: Shirin Krishna, *    Pain pattern determined: 1 PEN  Plan of care signed: 2017   Time in: 8:10am  Time Out:920am  Total Treatment time: 70 min  Precautions: Standard  Visit #: 12    POC due: 18  Reassessment due: 18    Pt has completed previous PT with [facility] and is now charged as commercial insurance.   Face to Face discussion of patient was done between PT and PTA.       Subjective   Yanna reports that there has been an increase in pain over the weekend to the point where she is having difficulty walking, initiating walking, and weight bearing on the R side after standing.    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, light duty, sitting, occasionally going up and down stairs)    Leisure: Bowl, walking for exercise, Does "[employer]"                      Pts goals:  Reduce pain, learn how to manage your symptom     Objective     Baseline IM Testing Results:   Date of testin2017  ROM 48-0 deg   Max Peak Torque 82    Min Peak Torque 34    Flex/Ext Ratio 2.4   % below normative data -69      Date of testin2017  ROM 60-0 deg   Max Peak Torque 125   Min Peak Torque 56   Flex/Ext Ratio 2.3   % below normative data --46      OUTCOMES:   From Initial Evaluation  FOTO: Focus on Therapeutic Outcomes   Category: lumbar   % Impaired: FOTO Not completed at time of eval  Current Score  = CJ = at least 20% but < 40% impaired, limited or restricted  Goal at Discharge Score = CJ = at least 20% but < 40% impaired, limited or restricted    Treatment    Pt was instructed in and performed the following:     Yanna received " therapeutic exercises to develop/improved posture, cardiovascular endurance, muscular endurance, lumbar ROM, strength and muscular endurance for 50 minutes including the following exercises:       [facility] 1/8/2018   Visit Number 11   VAS Pain Rating 5   Treadmill Time (in min.) 10   Speed 2   Extension in Standing -   Flexion in Lying -   Manual Therapy 15   Lumbar Flexion -   Lumbar Extension -   Lumbar Peak Torque -   Min Torque -   Lumbar Weight 58   Repetitions 20   Rating of Perceived Exertion 4.5   Ice - Z Lie (in min.) 10         Manual: prone piriformis release, R hip distraction, SI METS 15min    Sidelying clamshells 15x cuing to watch pelvic and engage core  Flexion in lying 10x,   Supine pelvic tilts 10   Modified Piriformis stretch 10 cuing for positioning hip  Bridges 10x    Peripheral muscle strengthening which included 1 set of 15-20 repetitions at a slow, controlled 7 second per rep pace focused on strengthening supporting musculature for improved body mechanics and functional mobility.  Pt and therapist focused on proper form during treatment to ensure optimal strengthening of each targeted muscle group.  Machines were utilized including torso rotation, leg extension, leg curl, chest press, upright row. Tricep extension, bicep curl, leg press, and hip abduction.    Home Exercise Program as follows:   Sidelying clamshells 15x, 3-4x weekly   Flexion in lying 10x, 3x/day  Supine pelvic tilts 5-10 s/h 5x, 2x daily   Z-lie 10-20 min, 2x daily  Modified Piriformis stretch 10s/h 3x/day  Bridges 10x, 3x/day  Standing extension 10x, 3x/day    Handouts were given to the patient. Pt demo good understanding of the education provided. Yanna demonstrated good return demonstration of activities.     Lumbar roll use compliance: no  Additional exercises taught this treatment session:  Added figure four stretches    Assessment     Patient was able to complete therapy with a decrease in R buttock pain.  Pt felt better post manuals and only reported minimal numbness feeling but she was able to walk and weight bear without increased pain. Pt was able to increase weight on lumbar med ex and required minimal cues for pacing. Will increase 5% next visit.  Pt is making good progress with treatment.    Pt will continue to benefit from skilled outpatient physical therapy to address the deficits stated in the impairment chart, provide pt/family education and to maximize pt's level of independence in the home and community environment.       Pt's spiritual, cultural and educational needs considered and pt agreeable to plan of care and goals as stated below:     Medical necessity is demonstrated by the following IMPAIRMENTS/PROBLEMS:    Pt presents with the following impairments:   History  Co-morbidities and personal factors that may impact the plan of care   Examination  Body Structures and Functions, activity limitations and participation restrictions that may impact the plan of care Clinical Presentation Decision Making/ Complexity Score   Co-morbidities:   Standard           Personal Factors:   lifestyle  character   Body Regions:   back  lower extremities     Body Systems:   gross symmetry  ROM  strength  gross coordinated movement  transitions     Activity limitations:   Learning and applying knowledge  no deficits     General Tasks and Commands  no deficits     Communication  no deficits     Mobility  lifting and carrying objects  walking  driving (bike, car, motorcycle)  Standing     Self care  washing oneself (bathing, drying, washing hands)  caring for body parts (brushing teeth, shaving, grooming)  dressing     Domestic Life  doing house work (cleaning house, washing dishes, laundry)     Interactions/Relationships  no deficits     Life Areas  employment     Community and Social Life  community life  recreation and leisure     Participation Restrictions:   Increased pain with prolonged sitting needed for work. Also  limited ability to perform exercise.     stable and uncomplicated    Low      Goals:   Short term goals:  6 weeks or 10 visits   1.  Pt will demonstrate increased lumbar ROM by at least 3 degrees from the initial ROM value with improvements noted in functional ROM and ability to perform ADLs.MET  2.  Pt will demonstrate increased maximum isometric torque value by 10% when compared to the initial value resulting in improved ability to perform bending, lifting, and carrying activities safely, confidently.  (MET)  3.  Patient report a reduction in worst pain score by 1-2 points for improved tolerance during work and recreational activities.  (progressing, not met)  4.  Pt able to perform HEP correctly with minimal cueing or supervision for therapist.  (MET)     Long term goals: 13 weeks or 20 visits   1. Pt will demonstrate increased lumbar ROM by at least 6 degrees from initial ROM value, resulting in improved ability to perform functional fwd bending while standing and sitting.  (progressing, not met)  2. Pt will demonstrate increased maximum isometric torque value by 20% when compared to the initial value resulting in improved ability to perform bending, lifting, and carrying activities safely, confidently.  (progressing, not met)  3. Pt to demonstrate ability to independently control and reduce their pain through posture positioning and mechanical movements throughout a typical day.  (progressing, not met)  4.  Patient will demonstrate improved overall function per FOTO Survey to CJ = at least 20% but < 40% impaired, limited or restricted score or less.  (progressing, not met)         Plan   Continue with POC

## 2018-01-16 ENCOUNTER — CLINICAL SUPPORT (OUTPATIENT)
Dept: REHABILITATION | Facility: OTHER | Age: 38
End: 2018-01-16
Attending: PHYSICAL MEDICINE & REHABILITATION
Payer: COMMERCIAL

## 2018-01-16 DIAGNOSIS — G89.29 CHRONIC RIGHT-SIDED LOW BACK PAIN WITH RIGHT-SIDED SCIATICA: ICD-10-CM

## 2018-01-16 DIAGNOSIS — M54.41 CHRONIC RIGHT-SIDED LOW BACK PAIN WITH RIGHT-SIDED SCIATICA: ICD-10-CM

## 2018-01-16 NOTE — PROGRESS NOTES
"[facility] Treatment      Name: Yanna Kwong  Clinic Number: [id number]  Date of Treatment: 2018   Diagnosis:   Encounter Diagnosis   Name Primary?    Chronic right-sided low back pain with right-sided sciatica      Physician: Shirin Krishna, *    Pain pattern determined: 1 PEN  Plan of care signed: 2017   Time in: 5:00  Time Out:6:00  Total Treatment time: 70 min  Precautions: Standard  Visit #: 13    POC due: 18  Reassessment due: 18    Pt has completed previous PT with [facility] and is now charged as commercial insurance.   Face to Face discussion of patient was done between PT and PTA.       Subjective   Yanna reports feeling better today compared to last session. She is compliant with her HEP. She does feel better from the program, but her pain does come back at times.     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, light duty, sitting, occasionally going up and down stairs)    Leisure: Bowl, walking for exercise, Does "[employer]"                      Pts goals:  Reduce pain, learn how to manage your symptom     Objective     Baseline IM Testing Results:   Date of testin2017  ROM 48-0 deg   Max Peak Torque 82    Min Peak Torque 34    Flex/Ext Ratio 2.4   % below normative data -69      Date of testin2017  ROM 60-0 deg   Max Peak Torque 125   Min Peak Torque 56   Flex/Ext Ratio 2.3   % below normative data --46      OUTCOMES:   From Initial Evaluation  FOTO: Focus on Therapeutic Outcomes   Category: lumbar   % Impaired: FOTO Not completed at time of eval  Current Score  = CJ = at least 20% but < 40% impaired, limited or restricted  Goal at Discharge Score = CJ = at least 20% but < 40% impaired, limited or restricted    Treatment    Pt was instructed in and performed the following:     Yanna received therapeutic exercises to " develop/improved posture, cardiovascular endurance, muscular endurance, lumbar ROM, strength and muscular endurance for 50 minutes including the following exercises:             Manual: none  [facility] 1/16/2018   Visit Number 12   VAS Pain Rating -   Treadmill Time (in min.) 10   Speed 2   Extension in Lying 10   Extension in Standing -   Flexion in Lying 10   Manual Therapy -   Lumbar Flexion -   Lumbar Extension -   Lumbar Peak Torque -   Min Torque -   Lumbar Weight 61   Repetitions 15   Rating of Perceived Exertion 4   Ice - Z Lie (in min.) 10       Sidelying clamshells 15x cuing to watch pelvic and engage core  Flexion in lying 10x,   Supine pelvic tilts 10   Modified Piriformis stretch 10 cuing for positioning hip  Bridges 10x    Peripheral muscle strengthening which included 1 set of 15-20 repetitions at a slow, controlled 7 second per rep pace focused on strengthening supporting musculature for improved body mechanics and functional mobility.  Pt and therapist focused on proper form during treatment to ensure optimal strengthening of each targeted muscle group.  Machines were utilized including torso rotation, leg extension, leg curl, chest press, upright row. Tricep extension, bicep curl, leg press, and hip abduction.    Home Exercise Program as follows:   Sidelying clamshells 15x, 3-4x weekly   Flexion in lying 10x, 3x/day  Supine pelvic tilts 5-10 s/h 5x, 2x daily   Z-lie 10-20 min, 2x daily  Modified Piriformis stretch 10s/h 3x/day  Bridges 10x, 3x/day  Standing extension 10x, 3x/day    Handouts were given to the patient. Pt demo good understanding of the education provided. Yanna demonstrated good return demonstration of activities.     Lumbar roll use compliance: no  Additional exercises taught this treatment session:  Added figure four stretches    Assessment     Patient was able to complete therapy with a decrease in R buttock pain. She tolerated lumbar machine With a 5% increase well  with no c/o LBP. Pt tolerated all the peripheral machines with no c/o pain. Demo HEP well with min vc for slow down.   Pt is making good progress with treatment.    Pt will continue to benefit from skilled outpatient physical therapy to address the deficits stated in the impairment chart, provide pt/family education and to maximize pt's level of independence in the home and community environment.       Pt's spiritual, cultural and educational needs considered and pt agreeable to plan of care and goals as stated below:     Medical necessity is demonstrated by the following IMPAIRMENTS/PROBLEMS:    Pt presents with the following impairments:   History  Co-morbidities and personal factors that may impact the plan of care   Examination  Body Structures and Functions, activity limitations and participation restrictions that may impact the plan of care Clinical Presentation Decision Making/ Complexity Score   Co-morbidities:   Standard           Personal Factors:   lifestyle  character   Body Regions:   back  lower extremities     Body Systems:   gross symmetry  ROM  strength  gross coordinated movement  transitions     Activity limitations:   Learning and applying knowledge  no deficits     General Tasks and Commands  no deficits     Communication  no deficits     Mobility  lifting and carrying objects  walking  driving (bike, car, motorcycle)  Standing     Self care  washing oneself (bathing, drying, washing hands)  caring for body parts (brushing teeth, shaving, grooming)  dressing     Domestic Life  doing house work (cleaning house, washing dishes, laundry)     Interactions/Relationships  no deficits     Life Areas  employment     Community and Social Life  community life  recreation and leisure     Participation Restrictions:   Increased pain with prolonged sitting needed for work. Also limited ability to perform exercise.     stable and uncomplicated    Low      Goals:   Short term goals:  6 weeks or 10 visits   1.   Pt will demonstrate increased lumbar ROM by at least 3 degrees from the initial ROM value with improvements noted in functional ROM and ability to perform ADLs.MET  2.  Pt will demonstrate increased maximum isometric torque value by 10% when compared to the initial value resulting in improved ability to perform bending, lifting, and carrying activities safely, confidently.  (MET)  3.  Patient report a reduction in worst pain score by 1-2 points for improved tolerance during work and recreational activities.  (progressing, not met)  4.  Pt able to perform HEP correctly with minimal cueing or supervision for therapist.  (MET)     Long term goals: 13 weeks or 20 visits   1. Pt will demonstrate increased lumbar ROM by at least 6 degrees from initial ROM value, resulting in improved ability to perform functional fwd bending while standing and sitting.  (progressing, not met)  2. Pt will demonstrate increased maximum isometric torque value by 20% when compared to the initial value resulting in improved ability to perform bending, lifting, and carrying activities safely, confidently.  (progressing, not met)  3. Pt to demonstrate ability to independently control and reduce their pain through posture positioning and mechanical movements throughout a typical day.  (progressing, not met)  4.  Patient will demonstrate improved overall function per FOTO Survey to CJ = at least 20% but < 40% impaired, limited or restricted score or less.  (progressing, not met)         Plan   Continue with POC

## 2018-01-18 ENCOUNTER — CLINICAL SUPPORT (OUTPATIENT)
Dept: REHABILITATION | Facility: OTHER | Age: 38
End: 2018-01-18
Attending: PHYSICAL MEDICINE & REHABILITATION
Payer: COMMERCIAL

## 2018-01-18 DIAGNOSIS — G89.29 CHRONIC RIGHT-SIDED LOW BACK PAIN WITH RIGHT-SIDED SCIATICA: ICD-10-CM

## 2018-01-18 DIAGNOSIS — M54.41 CHRONIC RIGHT-SIDED LOW BACK PAIN WITH RIGHT-SIDED SCIATICA: ICD-10-CM

## 2018-01-18 NOTE — PROGRESS NOTES
"[facility]      Name: Yanna Kwong  Clinic Number: [id number]  Date of Treatment: 2018   Diagnosis:   Encounter Diagnosis   Name Primary?    Chronic right-sided low back pain with right-sided sciatica      Physician: Shirin Krishna, *    Pain pattern determined: 1 PEN  Plan of care signed: 2017   Time in: 5:00  Time Out:6:00  Total Treatment time: 70 min  Precautions: Standard  Visit #: 13    POC due: 18  Reassessment due: 18    Pt has completed previous PT with [facility] and is now charged as commercial insurance.     Face to Face discussion of patient was done between PT and PTA.       Subjective   Yanna reports feeling ok today.  She is compliant with her HEP. She does feel better from the program, but her pain does come back at times.     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, light duty, sitting, occasionally going up and down stairs)    Leisure: Bowl, walking for exercise, Does "[employer]"                      Pts goals:  Reduce pain, learn how to manage your symptom     Objective     Baseline IM Testing Results:   Date of testin2017  ROM 48-0 deg   Max Peak Torque 82    Min Peak Torque 34    Flex/Ext Ratio 2.4   % below normative data -69      Date of testin2017  ROM 60-0 deg   Max Peak Torque 125   Min Peak Torque 56   Flex/Ext Ratio 2.3   % below normative data --46      OUTCOMES:   From Initial Evaluation  FOTO: Focus on Therapeutic Outcomes   Category: lumbar   % Impaired: FOTO Not completed at time of eval  Current Score  = CJ = at least 20% but < 40% impaired, limited or restricted  Goal at Discharge Score = CJ = at least 20% but < 40% impaired, limited or restricted    Treatment    Pt was instructed in and performed the following:     Yanna received therapeutic exercises to develop/improved posture, " cardiovascular endurance, muscular endurance, lumbar ROM, strength and muscular endurance for 50 minutes including the following exercises:     Manual: none    [facility] 1/18/2018   Visit Number 13   VAS Pain Rating 4   Treadmill Time (in min.) 10   Speed -   Extension in Lying 10   Extension in Standing -   Flexion in Lying 10   Lumbar Weight 61   Repetitions 20   Rating of Perceived Exertion 4   Ice - Z Lie (in min.) 10   Sidelying clamshells 15x cuing to watch pelvic and engage core  Flexion in lying 10x,   Supine pelvic tilts 10   Modified Piriformis stretch 10 cuing for positioning hip  Bridges 10x    Peripheral muscle strengthening which included 1 set of 15-20 repetitions at a slow, controlled 7 second per rep pace focused on strengthening supporting musculature for improved body mechanics and functional mobility.  Pt and therapist focused on proper form during treatment to ensure optimal strengthening of each targeted muscle group.  Machines were utilized including torso rotation, leg extension, leg curl, chest press, upright row. Tricep extension, bicep curl, ( omitted today leg press, and hip abduction.)    Home Exercise Program as follows:   Sidelying clamshells 15x, 3-4x weekly   Flexion in lying 10x, 3x/day  Supine pelvic tilts 5-10 s/h 5x, 2x daily   Z-lie 10-20 min, 2x daily  Modified Piriformis stretch 10s/h 3x/day  Bridges 10x, 3x/day  Standing extension 10x, 3x/day    Handouts were given to the patient. Pt demo good understanding of the education provided. Yanna demonstrated good return demonstration of activities.     Lumbar roll use compliance: no  Additional exercises taught this treatment session:  Added figure four stretches  Tennis ball trigger point release on wall and supine hold for 15 secs.   Assessment     Patient was able to complete therapy with a decrease in LB/ R buttock pain. She tolerated lumbar machine with no c/o LBP.  Pt tolerated all the peripheral machines with no  c/o pain. Demo HEP well with min vc for slow down.  Introduced her to the tennis ball trigger point release on wall and supine. She liked and felt a release in her R glut.Pt worked up to bicep curl due to pt pt late for session.   Pt is making good progress with treatment.    Pt will continue to benefit from skilled outpatient physical therapy to address the deficits stated in the impairment chart, provide pt/family education and to maximize pt's level of independence in the home and community environment.       Pt's spiritual, cultural and educational needs considered and pt agreeable to plan of care and goals as stated below:     Medical necessity is demonstrated by the following IMPAIRMENTS/PROBLEMS:    Pt presents with the following impairments:   History  Co-morbidities and personal factors that may impact the plan of care   Examination  Body Structures and Functions, activity limitations and participation restrictions that may impact the plan of care Clinical Presentation Decision Making/ Complexity Score   Co-morbidities:   Standard           Personal Factors:   lifestyle  character   Body Regions:   back  lower extremities     Body Systems:   gross symmetry  ROM  strength  gross coordinated movement  transitions     Activity limitations:   Learning and applying knowledge  no deficits     General Tasks and Commands  no deficits     Communication  no deficits     Mobility  lifting and carrying objects  walking  driving (bike, car, motorcycle)  Standing     Self care  washing oneself (bathing, drying, washing hands)  caring for body parts (brushing teeth, shaving, grooming)  dressing     Domestic Life  doing house work (cleaning house, washing dishes, laundry)     Interactions/Relationships  no deficits     Life Areas  employment     Community and Social Life  community life  recreation and leisure     Participation Restrictions:   Increased pain with prolonged sitting needed for work. Also limited ability to  perform exercise.     stable and uncomplicated    Low      Goals:   Short term goals:  6 weeks or 10 visits   1.  Pt will demonstrate increased lumbar ROM by at least 3 degrees from the initial ROM value with improvements noted in functional ROM and ability to perform ADLs.MET  2.  Pt will demonstrate increased maximum isometric torque value by 10% when compared to the initial value resulting in improved ability to perform bending, lifting, and carrying activities safely, confidently.  (MET)  3.  Patient report a reduction in worst pain score by 1-2 points for improved tolerance during work and recreational activities.  (progressing, not met)  4.  Pt able to perform HEP correctly with minimal cueing or supervision for therapist.  (MET)     Long term goals: 13 weeks or 20 visits   1. Pt will demonstrate increased lumbar ROM by at least 6 degrees from initial ROM value, resulting in improved ability to perform functional fwd bending while standing and sitting.  (progressing, not met)  2. Pt will demonstrate increased maximum isometric torque value by 20% when compared to the initial value resulting in improved ability to perform bending, lifting, and carrying activities safely, confidently.  (progressing, not met)  3. Pt to demonstrate ability to independently control and reduce their pain through posture positioning and mechanical movements throughout a typical day.  (progressing, not met)  4.  Patient will demonstrate improved overall function per FOTO Survey to CJ = at least 20% but < 40% impaired, limited or restricted score or less.  (progressing, not met)         Plan   Continue with POC

## 2018-01-25 ENCOUNTER — CLINICAL SUPPORT (OUTPATIENT)
Dept: REHABILITATION | Facility: OTHER | Age: 38
End: 2018-01-25
Attending: PHYSICAL MEDICINE & REHABILITATION
Payer: COMMERCIAL

## 2018-01-25 DIAGNOSIS — G89.29 CHRONIC RIGHT-SIDED LOW BACK PAIN WITH RIGHT-SIDED SCIATICA: ICD-10-CM

## 2018-01-25 DIAGNOSIS — M54.41 CHRONIC RIGHT-SIDED LOW BACK PAIN WITH RIGHT-SIDED SCIATICA: ICD-10-CM

## 2018-01-25 PROCEDURE — 97110 THERAPEUTIC EXERCISES: CPT

## 2018-01-30 ENCOUNTER — CLINICAL SUPPORT (OUTPATIENT)
Dept: REHABILITATION | Facility: OTHER | Age: 38
End: 2018-01-30
Attending: PHYSICAL MEDICINE & REHABILITATION
Payer: COMMERCIAL

## 2018-01-30 DIAGNOSIS — G89.29 CHRONIC RIGHT-SIDED LOW BACK PAIN WITH RIGHT-SIDED SCIATICA: ICD-10-CM

## 2018-01-30 DIAGNOSIS — M54.41 CHRONIC RIGHT-SIDED LOW BACK PAIN WITH RIGHT-SIDED SCIATICA: ICD-10-CM

## 2018-01-30 PROCEDURE — 97110 THERAPEUTIC EXERCISES: CPT

## 2018-01-30 NOTE — PATIENT INSTRUCTIONS
MET to Correct a Left Anteriorly Rotated Innominate using a dowel    Begin on your back with the knees and hips bent to 90 degrees.   Use a dowel or broomstick to go through the legs.   Have the dowel behind the left leg and in front of the right.   Stabilize the dowel on ether side with the hands.   Press down with the left leg and up with the right and hold for 6 seconds.   Slowly release back to the starting position. Repeat 10x      Unilateral Isometric Hip Flexion        Tighten stomach and raise right knee to outstretched arm. Push gently, keeping arm straight, trunk rigid. Hold 5 seconds.  Repeat 5 times. Do as needed per day.     https://orth.[employer].us/1098     Copyright © I. All rights reserved.

## 2018-01-30 NOTE — PROGRESS NOTES
"[facility]      Name: Yanna Kwong  Clinic Number: [id number]  Date of Treatment: 2018   Diagnosis:   Encounter Diagnosis   Name Primary?    Chronic right-sided low back pain with right-sided sciatica      Physician: Shirin Krishna, *    Pain pattern determined: 1 PEN  Plan of care signed: 2017   Time in: 3:40  Time Out:4:30  Total Treatment time: 54 min  Precautions: Standard  Visit #: 15    POC due: 18  Reassessment done: 18  Next reassessment due: 18      Pt has completed previous PT with [facility] and is now charged as commercial insurance.     Face to Face discussion of patient was done between PT and PTA.       Subjective   Yanna reports feeling ok today, yesterday she was having a lot pf pain, she was having a hard time walking around the grocery store. 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, light duty, sitting, occasionally going up and down stairs)    Leisure: Bowl, walking for exercise, Does "[employer]"                      Pts goals:  Reduce pain, learn how to manage your symptom     Objective     Baseline IM Testing Results:   Date of testin2017  ROM 48-0 deg   Max Peak Torque 82    Min Peak Torque 34    Flex/Ext Ratio 2.4   % below normative data -69      Date of testin2017  ROM 60-0 deg   Max Peak Torque 125   Min Peak Torque 56   Flex/Ext Ratio 2.3   % below normative data --46      OUTCOMES:   From Initial Evaluation  FOTO: Focus on Therapeutic Outcomes   Category: lumbar   % Impaired: FOTO Not completed at time of eval  Current Score  = CJ = at least 20% but < 40% impaired, limited or restricted  Goal at Discharge Score = CJ = at least 20% but < 40% impaired, limited or restricted    Treatment    Pt was instructed in and performed the " following:     Yanna received therapeutic exercises to develop/improved posture, cardiovascular endurance, muscular endurance, lumbar ROM, strength and muscular endurance for 50 minutes including the following exercises:     [facility] 1/30/2018   Visit Number 15   VAS Pain Rating 2.5   Treadmill Time (in min.) -   Speed -   Extension in Lying -   Extension in Standing -   Flexion in Lying -   Manual Therapy 5   Lumbar Flexion -   Lumbar Extension -   Lumbar Peak Torque -   Min Torque -   Lumbar Weight 66   Repetitions 15   Rating of Perceived Exertion 3   Ice - Z Lie (in min.) 10           Sidelying clamshells 15x cuing to watch pelvic and engage core  Flexion in lying 10x,   Supine pelvic tilts 10   Modified Piriformis stretch 10 cuing for positioning hip  Bridges 10x  Sidelying bridge 10x   Calf raises off step or on wedge 15-20 reps, 3-4x weekly      Peripheral muscle strengthening which included 1 set of 15-20 repetitions at a slow, controlled 7 second per rep pace focused on strengthening supporting musculature for improved body mechanics and functional mobility.  Pt and therapist focused on proper form during treatment to ensure optimal strengthening of each targeted muscle group.  Machines were utilized including torso rotation, leg extension, leg curl, chest press, upright row. Tricep extension, bicep curl, ( omitted today leg press, and hip abduction.)    Manual therapy: 5' correcting anterior rotated R innominate in side lying, supine, and sacral distraction      Home Exercise Program as follows:   Sidelying clamshells 15x, 3-4x weekly   Flexion in lying 10x, 3x/day  Supine pelvic tilts 5-10 s/h 5x, 2x daily   Z-lie 10-20 min, 2x daily  Modified Piriformis stretch 10s/h 3x/day  Bridges 10x, 3x/day  Standing extension 10x, 3x/day  Figure four stretches  Sidelying bridge 10x   Calf raises off step or on wedge 15-20 reps, 3-4x weekly  METS    Tennis ball trigger point release on wall and supine  hold for 15 secs.   Handouts were given to the patient. Pt demo good understanding of the education provided. Yanna demonstrated good return demonstration of activities.     Lumbar roll use compliance: no  Additional exercises taught this treatment session:  METS  Assessment     Patient reported no pain or discomfort post manual therapy, did not complete warm up or stretches due to late arrival. Pt was able to complete 15 reps of increased weight on lumbar med x. Pt performed all resistance exercises without increased symptoms.   Pt is making good progress with treatment.    Pt will continue to benefit from skilled outpatient physical therapy to address the deficits stated in the impairment chart, provide pt/family education and to maximize pt's level of independence in the home and community environment.       Pt's spiritual, cultural and educational needs considered and pt agreeable to plan of care and goals as stated below:     Medical necessity is demonstrated by the following IMPAIRMENTS/PROBLEMS:    Pt presents with the following impairments:   History  Co-morbidities and personal factors that may impact the plan of care   Examination  Body Structures and Functions, activity limitations and participation restrictions that may impact the plan of care Clinical Presentation Decision Making/ Complexity Score   Co-morbidities:   Standard           Personal Factors:   lifestyle  character   Body Regions:   back  lower extremities     Body Systems:   gross symmetry  ROM  strength  gross coordinated movement  transitions     Activity limitations:   Learning and applying knowledge  no deficits     General Tasks and Commands  no deficits     Communication  no deficits     Mobility  lifting and carrying objects  walking  driving (bike, car, motorcycle)  Standing     Self care  washing oneself (bathing, drying, washing hands)  caring for body parts (brushing teeth, shaving, grooming)  dressing     Domestic Life  doing  house work (cleaning house, washing dishes, laundry)     Interactions/Relationships  no deficits     Life Areas  employment     Community and Social Life  community life  recreation and leisure     Participation Restrictions:   Increased pain with prolonged sitting needed for work. Also limited ability to perform exercise.     stable and uncomplicated    Low      Goals:   Short term goals:  6 weeks or 10 visits   1.  Pt will demonstrate increased lumbar ROM by at least 3 degrees from the initial ROM value with improvements noted in functional ROM and ability to perform ADLs.MET  2.  Pt will demonstrate increased maximum isometric torque value by 10% when compared to the initial value resulting in improved ability to perform bending, lifting, and carrying activities safely, confidently.  (MET)  3.  Patient report a reduction in worst pain score by 1-2 points for improved tolerance during work and recreational activities.  (progressing, not met)  4.  Pt able to perform HEP correctly with minimal cueing or supervision for therapist.  (MET)     Long term goals: 13 weeks or 20 visits   1. Pt will demonstrate increased lumbar ROM by at least 6 degrees from initial ROM value, resulting in improved ability to perform functional fwd bending while standing and sitting.  (progressing, not met)  2. Pt will demonstrate increased maximum isometric torque value by 20% when compared to the initial value resulting in improved ability to perform bending, lifting, and carrying activities safely, confidently.  (progressing, not met)  3. Pt to demonstrate ability to independently control and reduce their pain through posture positioning and mechanical movements throughout a typical day.  (progressing, not met)  4.  Patient will demonstrate improved overall function per FOTO Survey to CJ = at least 20% but < 40% impaired, limited or restricted score or less.  (progressing, not met)         Plan   Continue with POC

## 2018-02-01 ENCOUNTER — CLINICAL SUPPORT (OUTPATIENT)
Dept: REHABILITATION | Facility: OTHER | Age: 38
End: 2018-02-01
Attending: PHYSICAL MEDICINE & REHABILITATION
Payer: COMMERCIAL

## 2018-02-01 DIAGNOSIS — M54.41 CHRONIC RIGHT-SIDED LOW BACK PAIN WITH RIGHT-SIDED SCIATICA: ICD-10-CM

## 2018-02-01 DIAGNOSIS — G89.29 CHRONIC RIGHT-SIDED LOW BACK PAIN WITH RIGHT-SIDED SCIATICA: ICD-10-CM

## 2018-02-01 PROCEDURE — 97110 THERAPEUTIC EXERCISES: CPT

## 2018-02-02 NOTE — PROGRESS NOTES
"[facility]      Name: Yanna Kwong  Clinic Number: [id number]  Date of Treatment: 2018   Diagnosis:   Encounter Diagnosis   Name Primary?    Chronic right-sided low back pain with right-sided sciatica      Physician: Shirin Krishna, *    Pain pattern determined: 1 PEN  Plan of care signed: 2017   Time in: 5:00  Time Out:6:05  Total Treatment time: 65 min  Precautions: Standard  Visit #: 16    POC due: 18  Reassessment done: 18  Next reassessment due: 18      Pt has completed previous PT with [facility] and is now charged as commercial insurance.     Face to Face discussion of patient was done between PT and PTA.       Subjective   Yanna reports feeling ok today. She notes she had some significant right LE burning and pain when arriving home (7/10 at worst). She has a hard time identifying aggravating factors. Recommended pt write down what activity she is doing when having a flare up. 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, light duty, sitting, occasionally going up and down stairs)    Leisure: Bowl, walking for exercise, Does "[employer]"                      Pts goals:  Reduce pain, learn how to manage your symptom     Objective     Baseline IM Testing Results:   Date of testin2017  ROM 48-0 deg   Max Peak Torque 82    Min Peak Torque 34    Flex/Ext Ratio 2.4   % below normative data -69      Date of testin2017  ROM 60-0 deg   Max Peak Torque 125   Min Peak Torque 56   Flex/Ext Ratio 2.3   % below normative data --46      OUTCOMES:   From Initial Evaluation  FOTO: Focus on Therapeutic Outcomes   Category: lumbar   % Impaired: FOTO Not completed at time of eval  Current Score  = CJ = at least 20% but < 40% impaired, limited or restricted  Goal at Discharge " Score = CJ = at least 20% but < 40% impaired, limited or restricted    Treatment    Pt was instructed in and performed the following:     Yanna received therapeutic exercises to develop/improved posture, cardiovascular endurance, muscular endurance, lumbar ROM, strength and muscular endurance for 50 minutes including the following exercises:     [facility] 2/1/2018   Visit Number 16   VAS Pain Rating 2   Treadmill Time (in min.) 10   Speed 2.5   Extension in Lying -   Extension in Standing -   Flexion in Lying -   Manual Therapy -   Lumbar Flexion -   Lumbar Extension -   Lumbar Peak Torque -   Min Torque -   Lumbar Weight 66   Repetitions 20   Rating of Perceived Exertion -   Ice - Z Lie (in min.) 10         Sidelying clamshells 15x cuing to watch pelvic and engage core  Flexion in lying 10x,   Supine pelvic tilts 10   Modified Piriformis stretch 10 cuing for positioning hip  Bridges 10x  Sidelying bridge 10x   Calf raises off step or on wedge 15-20 reps, 3-4x weekly      Peripheral muscle strengthening which included 1 set of 15-20 repetitions at a slow, controlled 7 second per rep pace focused on strengthening supporting musculature for improved body mechanics and functional mobility.  Pt and therapist focused on proper form during treatment to ensure optimal strengthening of each targeted muscle group.  Machines were utilized including torso rotation, leg extension, leg curl, chest press, upright row. Tricep extension, bicep curl, ( omitted today leg press, and hip abduction.)    Manual therapy: None      Home Exercise Program as follows:   Sidelying clamshells 15x, 3-4x weekly   Flexion in lying 10x, 3x/day  Supine pelvic tilts 5-10 s/h 5x, 2x daily   Z-lie 10-20 min, 2x daily  Modified Piriformis stretch 10s/h 3x/day  Bridges 10x, 3x/day  Standing extension 10x, 3x/day  Figure four stretches  Sidelying bridge 10x   Calf raises off step or on wedge 15-20 reps, 3-4x weekly    Tennis ball trigger  point release on wall and supine hold for 15 secs.   Handouts were given to the patient. Pt demo good understanding of the education provided. Yanna demonstrated good return demonstration of activities.     Lumbar roll use compliance: no  Additional exercises taught this treatment session:  HEP Review   Assessment     Patient reported mild low back pain which improved throughout treatment session. Pt performs HEP with mild-moderate v/c for technique. Pt was able to complete 20 reps of increased weight on lumbar med x. Pt performed all resistance exercises without increased symptoms.   Pt is making good progress with treatment.    Pt will continue to benefit from skilled outpatient physical therapy to address the deficits stated in the impairment chart, provide pt/family education and to maximize pt's level of independence in the home and community environment.       Pt's spiritual, cultural and educational needs considered and pt agreeable to plan of care and goals as stated below:     Medical necessity is demonstrated by the following IMPAIRMENTS/PROBLEMS:    Pt presents with the following impairments:   History  Co-morbidities and personal factors that may impact the plan of care   Examination  Body Structures and Functions, activity limitations and participation restrictions that may impact the plan of care Clinical Presentation Decision Making/ Complexity Score   Co-morbidities:   Standard           Personal Factors:   lifestyle  character   Body Regions:   back  lower extremities     Body Systems:   gross symmetry  ROM  strength  gross coordinated movement  transitions     Activity limitations:   Learning and applying knowledge  no deficits     General Tasks and Commands  no deficits     Communication  no deficits     Mobility  lifting and carrying objects  walking  driving (bike, car, motorcycle)  Standing     Self care  washing oneself (bathing, drying, washing hands)  caring for body parts (brushing  teeth, shaving, grooming)  dressing     Domestic Life  doing house work (cleaning house, washing dishes, laundry)     Interactions/Relationships  no deficits     Life Areas  employment     Community and Social Life  community life  recreation and leisure     Participation Restrictions:   Increased pain with prolonged sitting needed for work. Also limited ability to perform exercise.     stable and uncomplicated    Low      Goals:   Short term goals:  6 weeks or 10 visits   1.  Pt will demonstrate increased lumbar ROM by at least 3 degrees from the initial ROM value with improvements noted in functional ROM and ability to perform ADLs.MET  2.  Pt will demonstrate increased maximum isometric torque value by 10% when compared to the initial value resulting in improved ability to perform bending, lifting, and carrying activities safely, confidently.  (MET)  3.  Patient report a reduction in worst pain score by 1-2 points for improved tolerance during work and recreational activities.  (progressing, not met)  4.  Pt able to perform HEP correctly with minimal cueing or supervision for therapist.  (MET)     Long term goals: 13 weeks or 20 visits   1. Pt will demonstrate increased lumbar ROM by at least 6 degrees from initial ROM value, resulting in improved ability to perform functional fwd bending while standing and sitting.  (progressing, not met)  2. Pt will demonstrate increased maximum isometric torque value by 20% when compared to the initial value resulting in improved ability to perform bending, lifting, and carrying activities safely, confidently.  (progressing, not met)  3. Pt to demonstrate ability to independently control and reduce their pain through posture positioning and mechanical movements throughout a typical day.  (progressing, not met)  4.  Patient will demonstrate improved overall function per FOTO Survey to CJ = at least 20% but < 40% impaired, limited or restricted score or less.  (progressing, not  met)         Plan   Continue with POC

## 2018-02-09 ENCOUNTER — CLINICAL SUPPORT (OUTPATIENT)
Dept: REHABILITATION | Facility: OTHER | Age: 38
End: 2018-02-09
Attending: PHYSICAL MEDICINE & REHABILITATION
Payer: COMMERCIAL

## 2018-02-09 DIAGNOSIS — M54.41 CHRONIC RIGHT-SIDED LOW BACK PAIN WITH RIGHT-SIDED SCIATICA: ICD-10-CM

## 2018-02-09 DIAGNOSIS — G89.29 CHRONIC RIGHT-SIDED LOW BACK PAIN WITH RIGHT-SIDED SCIATICA: ICD-10-CM

## 2018-02-09 NOTE — PROGRESS NOTES
"[facility] Treatment      Name: Yanna Kwong  Clinic Number: [id number]  Date of Treatment: 2018   Diagnosis:   No diagnosis found.  Physician: Shirin Krishna, *    Pain pattern determined: 1 PEN  Plan of care signed: 2017   Time in: 7:30  Time Out:8:30  Total Treatment time: 65 min  Precautions: Standard  Visit #: 17    POC due: 18  Reassessment done: 18  Next reassessment due: 18      Pt has completed previous PT with [facility] and is now charged as commercial insurance.     Face to Face discussion of patient was done between PT and PTA.       Subjective   Yanna reports feeling ok today with no pain, although she still gets LBP at the end of the day. The pain was centralized to the low back and R buttock.    Pain Location: R side low back 2/10 pain/right calf pain     Occupation:  Patient coordinator  (Trains staff at main campus, light duty, sitting, occasionally going up and down stairs)    Leisure: Bowl, walking for exercise, Does "[employer]"                      Pts goals:  Reduce pain, learn how to manage your symptom     Objective     Baseline IM Testing Results:   Date of testin2017  ROM 48-0 deg   Max Peak Torque 82    Min Peak Torque 34    Flex/Ext Ratio 2.4   % below normative data -69      Date of testin2017  ROM 60-0 deg   Max Peak Torque 125   Min Peak Torque 56   Flex/Ext Ratio 2.3   % below normative data --46      OUTCOMES:   From Initial Evaluation  FOTO: Focus on Therapeutic Outcomes   Category: lumbar   % Impaired: FOTO Not completed at time of eval  Current Score  = CJ = at least 20% but < 40% impaired, limited or restricted  Goal at Discharge Score = CJ = at least 20% but < 40% impaired, limited or restricted    Treatment    Pt was instructed in and performed the following:     Yanna received therapeutic exercises to develop/improved posture, cardiovascular endurance, muscular " endurance, lumbar ROM, strength and muscular endurance for 50 minutes including the following exercises:       [facility] 2/9/2018   Visit Number 17   VAS Pain Rating 2   Treadmill Time (in min.) 10   Speed 2.5   Extension in Lying -   Extension in Standing 10   Flexion in Lying 10   Manual Therapy -   Lumbar Flexion -   Lumbar Extension -   Lumbar Peak Torque -   Min Torque -   Lumbar Weight 69   Repetitions 15   Rating of Perceived Exertion 3   Ice - Z Lie (in min.) 10       Sidelying clamshells 15x cuing to watch pelvic and engage core  Flexion in lying 10x,   Supine pelvic tilts 10   Modified Piriformis stretch 10 cuing for positioning hip  Bridges 10x  Sidelying bridge 10x   Calf raises off step or on wedge 15-20 reps, 3-4x weekly      Peripheral muscle strengthening which included 1 set of 15-20 repetitions at a slow, controlled 7 second per rep pace focused on strengthening supporting musculature for improved body mechanics and functional mobility.  Pt and therapist focused on proper form during treatment to ensure optimal strengthening of each targeted muscle group.  Machines were utilized including torso rotation, leg extension, leg curl, chest press, upright row. Tricep extension, bicep curl, ( omitted today leg press, and hip abduction.)    Manual therapy: None      Home Exercise Program as follows:   Sidelying clamshells 15x, 3-4x weekly   Flexion in lying 10x, 3x/day  Supine pelvic tilts 5-10 s/h 5x, 2x daily   Z-lie 10-20 min, 2x daily  Modified Piriformis stretch 10s/h 3x/day  Bridges 10x, 3x/day  Standing extension 10x, 3x/day  Figure four stretches  Sidelying bridge 10x   Calf raises off step or on wedge 15-20 reps, 3-4x weekly     Tennis ball trigger point release on wall and supine hold for 15 secs.   Handouts were given to the patient. Pt demo good understanding of the education provided. Yanna demonstrated good return demonstration of activities.     Lumbar roll use compliance:  no  Additional exercises taught this treatment session:  HEP Review   Assessment     Patient reported mild low back pain which improved throughout treatment session. Pt performs HEP with mild-moderate v/c for technique. Educated her to stand and do EIS when having pain with sitting. Pt was able to complete 20 reps of increased weight on lumbar med x. Pt performed all resistance exercises without increased symptoms.   Pt is making fair progress with treatment.    Pt will continue to benefit from skilled outpatient physical therapy to address the deficits stated in the impairment chart, provide pt/family education and to maximize pt's level of independence in the home and community environment.       Pt's spiritual, cultural and educational needs considered and pt agreeable to plan of care and goals as stated below:     Medical necessity is demonstrated by the following IMPAIRMENTS/PROBLEMS:    Pt presents with the following impairments:   History  Co-morbidities and personal factors that may impact the plan of care   Examination  Body Structures and Functions, activity limitations and participation restrictions that may impact the plan of care Clinical Presentation Decision Making/ Complexity Score   Co-morbidities:   Standard           Personal Factors:   lifestyle  character   Body Regions:   back  lower extremities     Body Systems:   gross symmetry  ROM  strength  gross coordinated movement  transitions     Activity limitations:   Learning and applying knowledge  no deficits     General Tasks and Commands  no deficits     Communication  no deficits     Mobility  lifting and carrying objects  walking  driving (bike, car, motorcycle)  Standing     Self care  washing oneself (bathing, drying, washing hands)  caring for body parts (brushing teeth, shaving, grooming)  dressing     Domestic Life  doing house work (cleaning house, washing dishes, laundry)     Interactions/Relationships  no deficits     Life  Areas  employment     Community and Social Life  community life  recreation and leisure     Participation Restrictions:   Increased pain with prolonged sitting needed for work. Also limited ability to perform exercise.     stable and uncomplicated    Low      Goals:   Short term goals:  6 weeks or 10 visits   1.  Pt will demonstrate increased lumbar ROM by at least 3 degrees from the initial ROM value with improvements noted in functional ROM and ability to perform ADLs.MET  2.  Pt will demonstrate increased maximum isometric torque value by 10% when compared to the initial value resulting in improved ability to perform bending, lifting, and carrying activities safely, confidently.  (MET)  3.  Patient report a reduction in worst pain score by 1-2 points for improved tolerance during work and recreational activities.  (progressing, not met)  4.  Pt able to perform HEP correctly with minimal cueing or supervision for therapist.  (MET)     Long term goals: 13 weeks or 20 visits   1. Pt will demonstrate increased lumbar ROM by at least 6 degrees from initial ROM value, resulting in improved ability to perform functional fwd bending while standing and sitting.  (progressing, not met)  2. Pt will demonstrate increased maximum isometric torque value by 20% when compared to the initial value resulting in improved ability to perform bending, lifting, and carrying activities safely, confidently.  (progressing, not met)  3. Pt to demonstrate ability to independently control and reduce their pain through posture positioning and mechanical movements throughout a typical day.  (progressing, not met)  4.  Patient will demonstrate improved overall function per FOTO Survey to CJ = at least 20% but < 40% impaired, limited or restricted score or less.  (progressing, not met)         Plan   Continue with POC

## 2018-02-16 ENCOUNTER — CLINICAL SUPPORT (OUTPATIENT)
Dept: REHABILITATION | Facility: OTHER | Age: 38
End: 2018-02-16
Attending: PHYSICAL MEDICINE & REHABILITATION
Payer: COMMERCIAL

## 2018-02-16 DIAGNOSIS — M54.41 CHRONIC RIGHT-SIDED LOW BACK PAIN WITH RIGHT-SIDED SCIATICA: ICD-10-CM

## 2018-02-16 DIAGNOSIS — G89.29 CHRONIC RIGHT-SIDED LOW BACK PAIN WITH RIGHT-SIDED SCIATICA: ICD-10-CM

## 2018-02-16 NOTE — PLAN OF CARE
"[facility] Treatment      Name: Yanna Kwong  Clinic Number: [id number]  Date of Treatment: 2018   Diagnosis:   Encounter Diagnosis   Name Primary?    Chronic right-sided low back pain with right-sided sciatica      Physician: Shirin Krishna, *    Pain pattern determined: 1 PEN  Plan of care signed: 2017   Time in: 3:36  Time Out:4:30  Total Treatment time: 54 min  Precautions: Standard  Visit #: 14    POC due: 18, resent 18  Reassessment done: 18  Next reassessment due: 18      Pt has completed previous PT with [facility] and is now charged as commercial insurance.     Face to Face discussion of patient was done between PT and PTA.       Subjective   Yanna reports feeling ok today.  She is compliant with her HEP. She does feel better from the program, but her pain does come back at times. She reported significant pain increase (7/10 at worst) this Tuesday after performing bending and rotation activity and from walking in heels over the weekend.     Pain Location: R side low back 3/10 pain/right calf pain     Occupation:  Patient coordinator  (Trains staff at main campus, light duty, sitting, occasionally going up and down stairs)    Leisure: Bowl, walking for exercise, Does "[employer]"                      Pts goals:  Reduce pain, learn how to manage your symptom     Objective     Baseline IM Testing Results:   Date of testin2017  ROM 48-0 deg   Max Peak Torque 82    Min Peak Torque 34    Flex/Ext Ratio 2.4   % below normative data -69      Date of testin2017  ROM 60-0 deg   Max Peak Torque 125   Min Peak Torque 56   Flex/Ext Ratio 2.3   % below normative data --46      OUTCOMES:   From Initial Evaluation  FOTO: Focus on Therapeutic Outcomes   Category: lumbar   % Impaired: FOTO Not completed at time of eval  Current Score  = CJ = at least 20% but < 40% impaired, limited or restricted  Goal at " Discharge Score = CJ = at least 20% but < 40% impaired, limited or restricted    Treatment    Pt was instructed in and performed the following:     Yanna received therapeutic exercises to develop/improved posture, cardiovascular endurance, muscular endurance, lumbar ROM, strength and muscular endurance for 50 minutes including the following exercises:     Manual: none    [facility] 1/25/2018   Visit Number 14   VAS Pain Rating 3   Treadmill Time (in min.) -   Speed -   Extension in Lying -   Extension in Standing 10   Flexion in Lying 10   Manual Therapy -   Lumbar Flexion -   Lumbar Extension -   Lumbar Peak Torque -   Min Torque -   Lumbar Weight 64   Repetitions 20   Rating of Perceived Exertion 4   Ice - Z Lie (in min.) 10       Sidelying clamshells 15x cuing to watch pelvic and engage core  Flexion in lying 10x,   Supine pelvic tilts 10   Modified Piriformis stretch 10 cuing for positioning hip  Bridges 10x  Sidelying bridge 10x   Calf raises off step or on wedge 15-20 reps, 3-4x weekly      Peripheral muscle strengthening which included 1 set of 15-20 repetitions at a slow, controlled 7 second per rep pace focused on strengthening supporting musculature for improved body mechanics and functional mobility.  Pt and therapist focused on proper form during treatment to ensure optimal strengthening of each targeted muscle group.  Machines were utilized including torso rotation, leg extension, leg curl, chest press, upright row. Tricep extension, bicep curl, ( omitted today leg press, and hip abduction.)    Home Exercise Program as follows:   Sidelying clamshells 15x, 3-4x weekly   Flexion in lying 10x, 3x/day  Supine pelvic tilts 5-10 s/h 5x, 2x daily   Z-lie 10-20 min, 2x daily  Modified Piriformis stretch 10s/h 3x/day  Bridges 10x, 3x/day  Standing extension 10x, 3x/day  Figure four stretches  Sidelying bridge 10x   Calf raises off step or on wedge 15-20 reps, 3-4x weekly    Tennis ball trigger point  release on wall and supine hold for 15 secs.   Handouts were given to the patient. Pt demo good understanding of the education provided. Yanna demonstrated good return demonstration of activities.     Lumbar roll use compliance: no  Additional exercises taught this treatment session:  Calf raises off step or on wedge 15-20 reps, 3-4x weekly  Sidelying bridge 10x   Assessment     Patient was able to complete therapy with a decrease in LB/ R buttock pain. Pt expressed increased peripheralization with neutral EIS. Pt experienced centralization with EIS and left side lean, added trial to HEP. ALso added PF on wedge with overall reduced thigh, calf, and back pain. She tolerated lumbar machine with no c/o LBP.  Pt tolerated all the peripheral machines with no c/o pain. Demo HEP well with min vc for slow down. Pt performed all resistance exercises without increased symptoms.   Pt is making good progress with treatment.    Pt will continue to benefit from skilled outpatient physical therapy to address the deficits stated in the impairment chart, provide pt/family education and to maximize pt's level of independence in the home and community environment.       Pt's spiritual, cultural and educational needs considered and pt agreeable to plan of care and goals as stated below:     Medical necessity is demonstrated by the following IMPAIRMENTS/PROBLEMS:    Pt presents with the following impairments:   History  Co-morbidities and personal factors that may impact the plan of care   Examination  Body Structures and Functions, activity limitations and participation restrictions that may impact the plan of care Clinical Presentation Decision Making/ Complexity Score   Co-morbidities:   Standard           Personal Factors:   lifestyle  character   Body Regions:   back  lower extremities     Body Systems:   gross symmetry  ROM  strength  gross coordinated movement  transitions     Activity limitations:   Learning and applying  knowledge  no deficits     General Tasks and Commands  no deficits     Communication  no deficits     Mobility  lifting and carrying objects  walking  driving (bike, car, motorcycle)  Standing     Self care  washing oneself (bathing, drying, washing hands)  caring for body parts (brushing teeth, shaving, grooming)  dressing     Domestic Life  doing house work (cleaning house, washing dishes, laundry)     Interactions/Relationships  no deficits     Life Areas  employment     Community and Social Life  community life  recreation and leisure     Participation Restrictions:   Increased pain with prolonged sitting needed for work. Also limited ability to perform exercise.     stable and uncomplicated    Low      Goals:   Short term goals:  6 weeks or 10 visits   1.  Pt will demonstrate increased lumbar ROM by at least 3 degrees from the initial ROM value with improvements noted in functional ROM and ability to perform ADLs.MET  2.  Pt will demonstrate increased maximum isometric torque value by 10% when compared to the initial value resulting in improved ability to perform bending, lifting, and carrying activities safely, confidently.  (MET)  3.  Patient report a reduction in worst pain score by 1-2 points for improved tolerance during work and recreational activities.  (progressing, not met)  4.  Pt able to perform HEP correctly with minimal cueing or supervision for therapist.  (MET)     Long term goals: 13 weeks or 20 visits   1. Pt will demonstrate increased lumbar ROM by at least 6 degrees from initial ROM value, resulting in improved ability to perform functional fwd bending while standing and sitting.  (progressing, not met)  2. Pt will demonstrate increased maximum isometric torque value by 20% when compared to the initial value resulting in improved ability to perform bending, lifting, and carrying activities safely, confidently.  (progressing, not met)  3. Pt to demonstrate ability to independently control and  reduce their pain through posture positioning and mechanical movements throughout a typical day.  (progressing, not met)  4.  Patient will demonstrate improved overall function per FOTO Survey to CJ = at least 20% but < 40% impaired, limited or restricted score or less.  (progressing, not met)         Plan   Continue with POC

## 2018-02-22 ENCOUNTER — CLINICAL SUPPORT (OUTPATIENT)
Dept: REHABILITATION | Facility: OTHER | Age: 38
End: 2018-02-22
Attending: PHYSICAL MEDICINE & REHABILITATION
Payer: COMMERCIAL

## 2018-02-22 DIAGNOSIS — G89.29 CHRONIC RIGHT-SIDED LOW BACK PAIN WITH RIGHT-SIDED SCIATICA: ICD-10-CM

## 2018-02-22 DIAGNOSIS — M54.41 CHRONIC RIGHT-SIDED LOW BACK PAIN WITH RIGHT-SIDED SCIATICA: ICD-10-CM

## 2018-02-22 PROCEDURE — 97110 THERAPEUTIC EXERCISES: CPT

## 2018-02-22 PROCEDURE — 97750 PHYSICAL PERFORMANCE TEST: CPT

## 2018-02-23 NOTE — PROGRESS NOTES
"[facility]      Name: Yanna Kwong  Clinic Number: [id number]  Date of Treatment: 2018   Diagnosis:   Encounter Diagnosis   Name Primary?    Chronic right-sided low back pain with right-sided sciatica      Physician: Shirni Krishna, *    Pain pattern determined: 1 PEN  Plan of care signed: 2017   Time in: 3:15 (Pt 15 mins late)  Time Out: 4:40  Total Treatment time: 45 min  Precautions: Standard  Visit #: 19    POC due: 18 not signed yet, but sent 18  Reassessment done: 18, 18  Next reassessment due: 3/25/18      Pt has completed previous PT with [facility] and is now charged as commercial insurance.     Face to Face discussion of patient was done between PT and PTA.       Subjective   Yanna reports feeling ok today with mild pain, although she still gets LBP at the end of the day. The pain was centralized to the low back and R buttock.    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, light duty, sitting, occasionally going up and down stairs)    Leisure: Bowl, walking for exercise, Does "[employer]"                      Pts goals:  Reduce pain, learn how to manage your symptom     Objective     Baseline IM Testing Results:   Date of testin2017  ROM 48-0 deg   Max Peak Torque 82    Min Peak Torque 34    Flex/Ext Ratio 2.4   % below normative data -69      Date of testin2017  ROM 60-0 deg   Max Peak Torque 125   Min Peak Torque 56   Flex/Ext Ratio 2.3   % below normative data --46        DC IM Testing Results:   Date of testin2018  ROM 60-0   Max Peak Torque 145   Min Peak Torque 51   Flex/Ext Ratio 1.8   % below relative normative data -46   % Change from Initial Evaluation +50       OUTCOMES:   From Initial Evaluation  FOTO: Focus on Therapeutic Outcomes   Category: lumbar   % Impaired: FOTO Not completed " at time of eval  Current Score  = CJ = at least 20% but < 40% impaired, limited or restricted  Goal at Discharge Score = CJ = at least 20% but < 40% impaired, limited or restricted    Treatment    Pt was instructed in and performed the following:     Yanna received therapeutic exercises to develop/improved posture, cardiovascular endurance, muscular endurance, lumbar ROM, strength and muscular endurance for 50 minutes including the following exercises:     [facility] 2/22/2018   Visit Number 19   VAS Pain Rating 2   Treadmill Time (in min.) -   Speed -   Extension in Lying -   Extension in Standing 10   Flexion in Lying 10   Manual Therapy -   Lumbar Flexion 60   Lumbar Extension 0   Lumbar Peak Torque 145   Min Torque 51   Percent From Norm -46   Percent Change from Initial 50   Lumbar Weight -   Repetitions -   Rating of Perceived Exertion -   Ice - Z Lie (in min.) 10       Peripheral muscle strengthening which included 1 set of 15-20 repetitions at a slow, controlled 7 second per rep pace focused on strengthening supporting musculature for improved body mechanics and functional mobility.  Pt and therapist focused on proper form during treatment to ensure optimal strengthening of each targeted muscle group.  Machines were utilized including torso rotation, leg extension, leg curl, chest press, upright row. Tricep extension, bicep curl, ( omitted today leg press, and hip abduction.)    Manual therapy: None      Home Exercise Program as follows:   Sidelying clamshells 15x, 3-4x weekly   Flexion in lying 10x, 3x/day  Supine pelvic tilts 5-10 s/h 5x, 2x daily   Z-lie 10-20 min, 2x daily  Modified Piriformis stretch 10s/h 3x/day  Bridges 10x, 3x/day  Standing extension 10x, 3x/day  Figure four stretches  Sidelying bridge 10x   Calf raises off step or on wedge 15-20 reps, 3-4x weekly     Tennis ball trigger point release on wall and supine hold for 15 secs.   Handouts were given to the patient. Pt demo good  understanding of the education provided. Yanna demonstrated good return demonstration of activities.     Lumbar roll use compliance: no  Additional exercises taught this treatment session:  HEP Review   Assessment     Patient has attended 20 visits of the [facility] program for aerobic work, med ex isometric testing with dynamic strengthening on med ex equipment for spine, whole body strengthening on med ex equipment, HEP, education.  Patient has completed [facility] Program and is ready to be transitioned into wellness program.  Importance of wellness program, and attending 2/month to maintain strength stressed.  Importance of continuing there ex and body mech and ergonomics stressed.   Patient demonstrates improvement in ability to reduce symptoms, improved posture, improved ROM and improved strength.   Reviewed HEP, and importance of maintaining a healthy spine through continued stretching and performance of HEP, good posture, good ergonomics, good body mech with ADL, leisure, and work.  Discharge handout with HEP given, and discussed aspects of exercise program, cardio, strengthening, and stretching.    Patient expressed understanding information given.  Patient plans to attend wellness and is ready to transition to wellness.  Patient reported mild low back pain which improved throughout treatment session. Pt performs HEP with minor v/c for technique. Pt performed all resistance exercises without increased symptoms.   Pt has made good progress with treatment and agrees she is appropriate to DC to wellness program at this time.     Pt will continue to benefit from skilled outpatient physical therapy to address the deficits stated in the impairment chart, provide pt/family education and to maximize pt's level of independence in the home and community environment.       Pt's spiritual, cultural and educational needs considered and pt agreeable to plan of care and goals as stated below:     Medical necessity  is demonstrated by the following IMPAIRMENTS/PROBLEMS:    Pt presents with the following impairments:   History  Co-morbidities and personal factors that may impact the plan of care   Examination  Body Structures and Functions, activity limitations and participation restrictions that may impact the plan of care Clinical Presentation Decision Making/ Complexity Score   Co-morbidities:   Standard           Personal Factors:   lifestyle  character   Body Regions:   back  lower extremities     Body Systems:   gross symmetry  ROM  strength  gross coordinated movement  transitions     Activity limitations:   Learning and applying knowledge  no deficits     General Tasks and Commands  no deficits     Communication  no deficits     Mobility  lifting and carrying objects  walking  driving (bike, car, motorcycle)  Standing     Self care  washing oneself (bathing, drying, washing hands)  caring for body parts (brushing teeth, shaving, grooming)  dressing     Domestic Life  doing house work (cleaning house, washing dishes, laundry)     Interactions/Relationships  no deficits     Life Areas  employment     Community and Social Life  community life  recreation and leisure     Participation Restrictions:   Increased pain with prolonged sitting needed for work. Also limited ability to perform exercise.     stable and uncomplicated    Low      Goals:   Short term goals:  6 weeks or 10 visits   1.  Pt will demonstrate increased lumbar ROM by at least 3 degrees from the initial ROM value with improvements noted in functional ROM and ability to perform ADLs.MET  2.  Pt will demonstrate increased maximum isometric torque value by 10% when compared to the initial value resulting in improved ability to perform bending, lifting, and carrying activities safely, confidently.  (MET)  3.  Patient report a reduction in worst pain score by 1-2 points for improved tolerance during work and recreational activities.  (progressing, not met)  4.  Pt  able to perform HEP correctly with minimal cueing or supervision for therapist.  (MET)     Long term goals: 13 weeks or 20 visits   1. Pt will demonstrate increased lumbar ROM by at least 6 degrees from initial ROM value, resulting in improved ability to perform functional fwd bending while standing and sitting.  Met 2/22/18  2. Pt will demonstrate increased maximum isometric torque value by 20% when compared to the initial value resulting in improved ability to perform bending, lifting, and carrying activities safely, confidently. Met 2/22/18  3. Pt to demonstrate ability to independently control and reduce their pain through posture positioning and mechanical movements throughout a typical day.  Met 2/22/18  4.  Patient will demonstrate improved overall function per FOTO Survey to CJ = at least 20% but < 40% impaired, limited or restricted score or less.  Met 2/22/18         Plan   DC

## 2018-02-27 ENCOUNTER — DOCUMENTATION ONLY (OUTPATIENT)
Dept: REHABILITATION | Facility: OTHER | Age: 38
End: 2018-02-27
Attending: PHYSICAL MEDICINE & REHABILITATION
Payer: COMMERCIAL

## 2018-02-27 NOTE — PROGRESS NOTES
"Health  Wellness Visit Note    Name: Yanna Kwong  Clinic Number: [id number]  Physician: Shirin Krishna, *  Diagnosis: No diagnosis found.  Past Medical History:   Diagnosis Date    Thyroid nodule      Visit Number: 21  Precautions: Standard  Time In: 5:15 PM  Time Out: 6:15 PM  Total Treatment Time: 60 minutes    Subjective:   Patient reports her "back is having a good day"; says that she is having minimal back pain, rated currently about a 2; normally her back pain ranges between a 3-3.5; has been assisting her father since he had knee surgery-been performing household work; patient is compliant with her stretching, usually twice per day but does not ice(uses biofreeze); recommended that patient begin an icing routine; mentioned that she will have back pain at work-encouraged patient to take breaks and perform her stretches during the day as well; usually has numbness/tingling in her leg into her right calf muscle-occurs mostly when she is sitting (or trying to sit); advised patient to keep her feet flat on the floor while at home (says that she will try to stretch her leg out to alleviate sciatic symptoms).      Objective:   Yanna completed therapeutic stretches (FAMILIA, bridges, hip clams, EIS, PPT) and the following MedX exercise machines: core lumbar, torso rotation l/r, leg extension, leg curl, upright row, chest press, biceps curl, triceps extension, leg press    See exercise log in patient folder for rate of exertion and repetitions completed.     Assessment:   Patient tolerated all exercises on the MedX equipment without any increase in symptoms; iced lower back after completion of exercises.    Plan:  Continue with established plan of care towards wellness goals. Patient has one free visit remaining; discuss plan options.    Health  : Shirin Kee  2/27/2018  "

## 2018-03-01 ENCOUNTER — DOCUMENTATION ONLY (OUTPATIENT)
Dept: REHABILITATION | Facility: OTHER | Age: 38
End: 2018-03-01
Attending: PHYSICAL MEDICINE & REHABILITATION
Payer: COMMERCIAL

## 2018-03-01 NOTE — PROGRESS NOTES
Health  Wellness Visit Note    Name: Yanna Kwong  Clinic Number: [id number]  Physician: Shirin Krishna, *  Diagnosis: No diagnosis found.  Past Medical History:   Diagnosis Date    Thyroid nodule      Visit Number: 22  Precautions: Standard  Time In: 5:05 PM  Time Out: 6:00 PM  Total Treatment Time: 55  minutes    Subjective:   Patient reports her was a little stiff when she came in pain at about a 2; she said the warm up and stretch did help a little bit. She has been stretching daily but not icing; she prefers the biofreeze to ice. She has still been feeling some numbness and tingling in her legs and now it is affecting both calves not just the right calf. She will be going to assist her father again on Saturday. HC recommended that she pace herself and take breaks when she needs to; also stretch periodically as well.    Objective:   Yanna completed therapeutic stretches (FAMILIA, bridges, hip clams, EIS, PPT) and the following MedX exercise machines: core lumbar, torso rotation l/r, leg extension, leg curl, upright row, chest press, biceps curl, triceps extension, leg press    See exercise log in patient folder for rate of exertion and repetitions completed.     Assessment:   Patient tolerated all exercises on the MedX equipment without any increase in symptoms; iced lower back after completion of exercises.    Plan:  Continue with established plan of care towards wellness goals. Patient will continue with Plan A.    Health  : Jesus Moe, PCT  3/1/2018

## 2018-03-15 ENCOUNTER — DOCUMENTATION ONLY (OUTPATIENT)
Dept: REHABILITATION | Facility: OTHER | Age: 38
End: 2018-03-15
Attending: PHYSICAL MEDICINE & REHABILITATION
Payer: COMMERCIAL

## 2018-03-15 NOTE — PROGRESS NOTES
Health  Wellness Visit Note    Name: Yanna Kwong  Clinic Number: [id number]  Physician: Shirin Krishna, *  Diagnosis: No diagnosis found.  Past Medical History:   Diagnosis Date    Thyroid nodule      Visit Number: 23  Precautions: Standard  Time In: 5:05 PM  Time Out: 6:05 PM  Total Treatment Time: 60  minutes    Subjective:   Patient reports her back was a little stiff when she came in pain at about a 1-2; she said the warm up and stretch did help loosen her back up some. She did have a bad flare up on yesterday but says the pain was nowhere near the pain she used to feel; she is happy with her progression thus far. She has been stretching daily but not icing; she prefers the biofreeze to ice. She plans to start a walking regimen within the next week. HC recommended she start off slow and work her way up; also reminded her to stretch and ice after her walk if she is feeling any increased pain.  HC will follow up with her about numbness in her legs and walking regimen on next visit.    Objective:   Yanna completed therapeutic stretches (FAMILIA, bridges, hip clams, EIS, PPT) and the following MedX exercise machines: core lumbar, torso rotation l/r, leg extension, leg curl, upright row, chest press, biceps curl, triceps extension, leg press    See exercise log in patient folder for rate of exertion and repetitions completed.     Assessment:   Patient tolerated all exercises on the MedX equipment without any increase in symptoms; iced lower back after completion of exercises. She will be increasing her weight on leg curl, chest press, upright row, and bicep curl.    Plan:  Continue with established plan of care towards wellness goals. Patient will continue with Plan A.    Health  : Jesus Moe, PCT  3/15/2018

## 2018-03-22 ENCOUNTER — DOCUMENTATION ONLY (OUTPATIENT)
Dept: REHABILITATION | Facility: OTHER | Age: 38
End: 2018-03-22
Attending: PHYSICAL MEDICINE & REHABILITATION
Payer: COMMERCIAL

## 2018-03-22 NOTE — PROGRESS NOTES
Health  Wellness Visit Note    Name: Yanna Kwong  Clinic Number: [id number]  Physician: Shirin Krishna, *  Diagnosis: No diagnosis found.  Past Medical History:   Diagnosis Date    Thyroid nodule      Visit Number: 24  Precautions: Standard  Time In: 5:05 PM  Time Out: 6:05 PM  Total Treatment Time: 60  minutes    Subjective:   Patient reports her back was feeling good this week. She did feel some stiffness when she came in pain at about a 1-2; she said the warm up and stretch did help loosen her back up some.  She had a long weekend moving around a lot and says that her back did have some increase in pain; she stretched after and said it did give her relief. She has still been helping out with her dad on certain weekends; HC reminded her to not overdue herself when she is there and to stretch and ice if she feels any pain while helping him. She has been stretching daily but not icing; she prefers the biofreeze to ice. She plans to start a walking regimen in the coming weeks. HC recommended she start off slow and work her way up; also reminded her to stretch and ice after her walk if she is feeling any increased pain.  HC will follow up with her about numbness in her legs and walking regimen on next visit.    Objective:   Yanna completed therapeutic stretches (FAMILIA, bridges, hip clams, EIS, PPT) and the following MedX exercise machines: core lumbar, torso rotation l/r, leg extension, leg curl, upright row, chest press, biceps curl, triceps extension, leg press    See exercise log in patient folder for rate of exertion and repetitions completed.     Assessment:   Patient tolerated all exercises on the MedX equipment without any increase in symptoms; iced lower back after completion of exercises. She will be increasing her weight on core lumbar, leg extension, leg curl,  upright row, and tricep extension.    Plan:  Continue with established plan of care towards wellness goals. Patient will  continue with Plan A.    Health  : Jesus Moe, PCT  3/22/2018

## 2018-03-29 ENCOUNTER — DOCUMENTATION ONLY (OUTPATIENT)
Dept: REHABILITATION | Facility: OTHER | Age: 38
End: 2018-03-29
Attending: PHYSICAL MEDICINE & REHABILITATION
Payer: COMMERCIAL

## 2018-03-29 NOTE — PROGRESS NOTES
Health  Wellness Visit Note    Name: Yanna Kwong  Clinic Number: [id number]  Physician: Shirin Krishna, *  Diagnosis: No diagnosis found.  Past Medical History:   Diagnosis Date    Thyroid nodule      Visit Number: 25  Precautions: Standard  Time In: 4:30 PM  Time Out: 5:30 PM  Total Treatment Time: 60  minutes    Subjective:   Patient reports her back was feeling good this week. She did feel some stiffness when she came in pain at about a 1-2; she said the warm up and stretch did help loosen her back up some.  She had a long weekend helping her parents around their house; she had to do a lot of lifting and bending over. Her back pain did flare up as a result but she did not stretch or ice. HC reminded her to not overdue herself when she is there and to stretch and ice if she feels any pain while helping them. She has been stretching daily but not icing; . She plans to start a walking regimen in the coming weeks. HC will follow up with her about numbness in her legs and walking regimen on next visit.    Objective:   Yanna completed therapeutic stretches (FAMILIA, bridges, hip clams, EIS, PPT) and the following MedX exercise machines: core lumbar, torso rotation l/r, leg extension, leg curl, upright row, chest press, biceps curl, triceps extension, leg press    See exercise log in patient folder for rate of exertion and repetitions completed.     Assessment:   Patient tolerated all exercises on the MedX equipment without any increase in symptoms; iced lower back after completion of exercises. She will be increasing her weight on core lumbar, leg extension, leg curl,  upright row, and tricep extension.    Plan:  Continue with established plan of care towards wellness goals. Patient will continue with Plan A.    Health  : Jesus Moe, PCT  3/29/2018

## 2018-04-05 ENCOUNTER — DOCUMENTATION ONLY (OUTPATIENT)
Dept: REHABILITATION | Facility: OTHER | Age: 38
End: 2018-04-05
Attending: PHYSICAL MEDICINE & REHABILITATION
Payer: COMMERCIAL

## 2018-04-05 NOTE — PROGRESS NOTES
Health  Wellness Visit Note    Name: Yanna Kwong  Clinic Number: [id number]  Physician: Shirin Krishna, *  Diagnosis: No diagnosis found.  Past Medical History:   Diagnosis Date    Thyroid nodule      Visit Number: 26  Precautions: Standard  Time In: 5:00 PM  Time Out: 6:00 PM  Total Treatment Time: 60  minutes    Subjective:   Patient reports her back was feeling great this week.  She hasn't been feeling the stiffness this weak just a minor tightness in her low back; she said the warm up and stretch did help loosen her back up some.  She has a long weekend helping her parents around their house; she had to do a lot of lifting and bending over the last time she went. Her back pain did flare up the last time so HC reminded her to not overdue herself when she is there and to stretch and ice if she feels any pain while helping them. She has been stretching daily but not icing; . She was very pleased to be able to increase the speed on the treadmill for her warm up and had no increase in pain. She plans to start a walking regimen in the coming weeks. She says the is still feeling the numbness down the side of her leg, but says it is getting better. HC will follow up with her about  walking regimen on next visit.    Objective:   Yanna completed therapeutic stretches (FAMILIA, bridges, hip clams, EIS, PPT) and the following MedX exercise machines: core lumbar, torso rotation l/r, leg extension, leg curl, upright row, chest press, biceps curl, triceps extension, leg press    See exercise log in patient folder for rate of exertion and repetitions completed.     Assessment:   Patient tolerated all exercises on the MedX equipment without any increase in symptoms; iced lower back after completion of exercises. She will be increasing her weight on core lumbar, torso rotation, leg extension, leg curl,  upright row, leg press, chest press and bicep curl .    Plan:  Continue with established plan of care  towards wellness goals. Patient will continue with Plan A.    Health  : Jesus Moe, PCT  4/5/2018

## 2018-04-12 ENCOUNTER — DOCUMENTATION ONLY (OUTPATIENT)
Dept: REHABILITATION | Facility: OTHER | Age: 38
End: 2018-04-12
Attending: PHYSICAL MEDICINE & REHABILITATION
Payer: COMMERCIAL

## 2018-04-12 NOTE — PROGRESS NOTES
Health  Wellness Visit Note    Name: Yanna Kwong  Clinic Number: [id number]  Physician: Shirin Krishna, *  Diagnosis: No diagnosis found.  Past Medical History:   Diagnosis Date    Thyroid nodule      Visit Number: 27  Precautions: Standard  Time In: 5:10 PM  Time Out: 6:00 PM  Total Treatment Time: 50  minutes    Subjective:   Patient reports her back was feeling good this week.  She hasn't been feeling the stiffness this weak just a minor tightness in her low back; she said the warm up and stretch did help loosen her back up some.She will be taking a weekend off from her parents and will be visiting with her grandmother for her birthday. She has been stretching daily but not icing; . She was very pleased to be able to increase the speed on the treadmill for her warm up again on today with no increase in pain. She plans to start a walking regimen in the coming weeks. HC will follow up with her about  walking regimen and leg numbness on next visit.    Objective:   Yanna completed therapeutic stretches (FAMILIA, bridges, hip clams, EIS, PPT) and the following MedX exercise machines: core lumbar, torso rotation l/r, leg extension, leg curl, upright row, chest press, biceps curl, triceps extension, leg press    See exercise log in patient folder for rate of exertion and repetitions completed.     Assessment:   Patient tolerated all exercises on the MedX equipment without any increase in symptoms; iced lower back after completion of exercises. She will be increasing her weight on upright row on next visit .    Plan:  Continue with established plan of care towards wellness goals. Patient will continue with Plan A.    Health  : Jesus Moe, PCT  4/12/2018

## 2018-04-19 ENCOUNTER — DOCUMENTATION ONLY (OUTPATIENT)
Dept: REHABILITATION | Facility: OTHER | Age: 38
End: 2018-04-19
Attending: PHYSICAL MEDICINE & REHABILITATION
Payer: COMMERCIAL

## 2018-04-19 NOTE — PROGRESS NOTES
Health  Wellness Visit Note    Name: Yanna Kwong  Clinic Number: [id number]  Physician: hSirin Krishna, *  Diagnosis: No diagnosis found.  Past Medical History:   Diagnosis Date    Thyroid nodule      Visit Number: 27  Precautions: Standard  Time In: 5:10 PM  Time Out: 6:00 PM  Total Treatment Time: 50  minutes    Subjective:   Patient reports her back was feeling good this week; she felt very good today she if very happy with her progress. She says that she had a long conversation with someone recently and the prolonged standing did not bother her back as much as it usually. She hasn't been feeling the stiffness this week just a minor tightness in her low back; she said the warm up and stretch did help loosen her back up some. On May 30th she will be starting an 80 day circuit challenge. She has been able to maintain  her speed increase on the treadmill for her warm up again on today with no increase in pain. She still feels some leg numbness but it is much less intense and does not bother nearly as much as it used to. HC will follow up about 80 day challenge in the coming weeks.    Objective:   Yanna completed therapeutic stretches (FAMILIA, bridges, hip clams, EIS, PPT) and the following MedX exercise machines: core lumbar, torso rotation l/r, leg extension, leg curl, upright row, chest press, biceps curl, triceps extension, leg press    See exercise log in patient folder for rate of exertion and repetitions completed.     Assessment:   Patient tolerated all exercises on the MedX equipment without any increase in symptoms; iced lower back after completion of exercises. She will be increasing her weight on upright row on next visit .    Plan:  Continue with established plan of care towards wellness goals. Patient will continue with Plan A.    Health  : Jesus Moe, PCT  4/19/2018

## 2018-04-26 ENCOUNTER — DOCUMENTATION ONLY (OUTPATIENT)
Dept: REHABILITATION | Facility: OTHER | Age: 38
End: 2018-04-26
Attending: PHYSICAL MEDICINE & REHABILITATION
Payer: COMMERCIAL

## 2018-04-26 NOTE — PROGRESS NOTES
Health  Wellness Visit Note    Name: Yanna Kwong  Clinic Number: [id number]  Physician: Shirin Krishna, *  Diagnosis: No diagnosis found.  Past Medical History:   Diagnosis Date    Thyroid nodule      Visit Number: 29  Precautions: Standard  Time In: 5:10 PM  Time Out: 6:00 PM  Total Treatment Time: 50  minutes    Subjective:   Patient reports her back was feeling good this week; she felt very good today she if very happy with her progress. She took the weekend off from her parents and rested. She hasn't been feeling the stiffness this week just a minor tightness in her low back; she said the warm up and stretch did help loosen her back up some. On May 30th she will be starting an 80 day circuit challenge. She has been able to maintain  her speed increase on the treadmill for her warm up again on today with no increase in pain. She still feels some leg numbness but it is much less intense and does not bother nearly as much as it used to. May go to [employer] this weekend; HC reminded her to stretch periodically and ice after if she has any flare ups while she is out there. HC will follow up about 80 day challenge in the coming weeks.     Objective:   Yanna completed therapeutic stretches (FAMILIA, bridges, hip clams, EIS, PPT) and the following MedX exercise machines: core lumbar, torso rotation l/r, leg extension, leg curl, upright row, chest press, biceps curl, triceps extension, leg press    See exercise log in patient folder for rate of exertion and repetitions completed.     Assessment:   Patient tolerated all exercises on the MedX equipment without any increase in symptoms; iced lower back after completion of exercises. She will be increasing her weight on upright row on next visit .    Plan:  Continue with established plan of care towards wellness goals. Patient will continue with Plan A.    Health  : Jesus Moe, PCT  4/26/2018

## 2018-05-03 ENCOUNTER — DOCUMENTATION ONLY (OUTPATIENT)
Dept: REHABILITATION | Facility: OTHER | Age: 38
End: 2018-05-03
Attending: PHYSICAL MEDICINE & REHABILITATION
Payer: COMMERCIAL

## 2018-05-03 NOTE — PROGRESS NOTES
Health  Wellness Visit Note    Name: Yanna Kwong  Clinic Number: [id number]  Physician: Shirin Krishna, *  Diagnosis: No diagnosis found.  Past Medical History:   Diagnosis Date    Thyroid nodule      Visit Number: 30  Precautions: Standard  Time In: 5:10 PM  Time Out: 6:00 PM  Total Treatment Time: 50  minutes    Subjective:   Patient reports her back was feeling good this week; she felt very good today she if very happy with her progress. She started her 80 day workout program; she is doing well with it so far; HC encouraged her to stick with it because it will be a tough transition. She hasn't been feeling the stiffness this week just a minor tightness in her low back; she said the warm up and stretch did help loosen her back up some. She did the elliptical today and enjoyed it will start using it going forward. She was excited to report that she hasn't been feeling the numbness in her legs as of late. HC will follow up about 80 day challenge in the coming weeks.     Objective:   Yanna completed therapeutic stretches (FAMILIA, bridges, hip clams, EIS, PPT) and the following MedX exercise machines: core lumbar, torso rotation l/r, leg extension, leg curl, upright row, chest press, biceps curl, triceps extension, leg press    See exercise log in patient folder for rate of exertion and repetitions completed.     Assessment:   Patient tolerated all exercises on the MedX equipment without any increase in symptoms; iced lower back after completion of exercises. She will be increasing her weight on upright row on next visit .    Plan:  Continue with established plan of care towards wellness goals. Patient will continue with Plan A.    Health  : Jesus Moe, PCT  5/3/2018

## 2018-05-31 ENCOUNTER — DOCUMENTATION ONLY (OUTPATIENT)
Dept: REHABILITATION | Facility: OTHER | Age: 38
End: 2018-05-31
Attending: PHYSICAL MEDICINE & REHABILITATION
Payer: COMMERCIAL

## 2018-05-31 NOTE — PROGRESS NOTES
Health  Wellness Visit Note    Name: Yanna Kwong  Clinic Number: [id number]  Physician: Shirin Krishna, *  Diagnosis: No diagnosis found.  Past Medical History:   Diagnosis Date    Thyroid nodule      Visit Number: 31  Precautions: Standard  Time In: 5:05 PM  Time Out: 6:05 PM  Total Treatment Time: 60  minutes    Subjective:   Patient reports her back was feeling good this week; she felt very good today she if very happy with her progress. She started her 80 day workout program; Today is day 30 and she is doing well with it so far; HC encouraged her to stick with it because it will be a tough transition. She hasn't been feeling the stiffness this week just a minor tightness in her low back; she said the warm up and stretch did help loosen her back up some. She did report that she has  been feeling a dull ache down her right  leg as of late, but only when she puts weight on the right calf. HC will follow up about 80 day challenge in the coming weeks.     Objective:   Yanna completed therapeutic stretches (FAMILIA, bridges, hip clams, EIS, PPT) and the following MedX exercise machines: core lumbar, torso rotation l/r, leg extension, leg curl, upright row, chest press, biceps curl, triceps extension, leg press    See exercise log in patient folder for rate of exertion and repetitions completed.     Assessment:   Patient tolerated all exercises on the MedX equipment without any increase in symptoms; iced lower back after completion of exercises. She will be increasing her weight on upright row on next visit .    Plan:  Continue with established plan of care towards wellness goals. Patient will continue with Plan A.    Health  : Jesus Moe, PCT  5/31/2018

## 2018-07-17 ENCOUNTER — TELEPHONE (OUTPATIENT)
Dept: OBSTETRICS AND GYNECOLOGY | Facility: CLINIC | Age: 38
End: 2018-07-17

## 2018-07-17 RX ORDER — KETOROLAC TROMETHAMINE 10 MG/1
10 TABLET, FILM COATED ORAL EVERY 8 HOURS
Qty: 18 TABLET | Refills: 0 | Status: SHIPPED | OUTPATIENT
Start: 2018-07-17 | End: 2018-11-07 | Stop reason: SDUPTHER

## 2018-07-17 NOTE — TELEPHONE ENCOUNTER
Rx sent notify patient not to cancel or reschedule because will then be 3+ months behind on annual. Visit last September was a problem visit

## 2018-07-17 NOTE — TELEPHONE ENCOUNTER
Notified patient that rx was sent to her pharmacy and that per Dr. Irwin she needs to keep the annual appointment that she has in October for if not she will be 3+ mos behind. Patient verbalized understanding.

## 2018-07-17 NOTE — TELEPHONE ENCOUNTER
----- Message from Marycarmen Barreto sent at 7/17/2018  3:39 PM CDT -----  Contact: ext [phone] or [phone]  Patient called in returning your call. Please advise.

## 2018-07-17 NOTE — TELEPHONE ENCOUNTER
Pt. needs a refill on ketorolac (TORADOL) 10 mg tablet called into [facility] on file    Last Annual was 7/17/2017 mya pap and an annual scheduled in October. Please advise

## 2018-07-17 NOTE — TELEPHONE ENCOUNTER
----- Message from David Benitez sent at 7/17/2018  2:14 PM CDT -----  Contact: self  Pt. needs a refill on ketorolac (TORADOL) 10 mg tablet called into [facility] on file.    Please call pt at [phone] (A) if you have any questions. <<<call this number    Thanks.

## 2018-10-01 ENCOUNTER — HOSPITAL ENCOUNTER (OUTPATIENT)
Dept: RADIOLOGY | Facility: HOSPITAL | Age: 38
Discharge: HOME | End: 2018-10-01
Attending: OBSTETRICS & GYNECOLOGY
Payer: COMMERCIAL

## 2018-10-01 ENCOUNTER — OFFICE VISIT (OUTPATIENT)
Dept: INTERNAL MEDICINE | Facility: CLINIC | Age: 38
End: 2018-10-01
Payer: COMMERCIAL

## 2018-10-01 ENCOUNTER — PATIENT MESSAGE (OUTPATIENT)
Dept: INTERNAL MEDICINE | Facility: CLINIC | Age: 38
End: 2018-10-01

## 2018-10-01 ENCOUNTER — OFFICE VISIT (OUTPATIENT)
Dept: OPTOMETRY | Facility: CLINIC | Age: 38
End: 2018-10-01
Payer: COMMERCIAL

## 2018-10-01 ENCOUNTER — OFFICE VISIT (OUTPATIENT)
Dept: OBSTETRICS AND GYNECOLOGY | Facility: CLINIC | Age: 38
End: 2018-10-01
Payer: COMMERCIAL

## 2018-10-01 VITALS
DIASTOLIC BLOOD PRESSURE: 70 MMHG | WEIGHT: 185.19 LBS | OXYGEN SATURATION: 98 % | SYSTOLIC BLOOD PRESSURE: 110 MMHG | HEIGHT: 63 IN | HEART RATE: 68 BPM | BODY MASS INDEX: 32.81 KG/M2

## 2018-10-01 VITALS
HEIGHT: 64 IN | WEIGHT: 184.94 LBS | BODY MASS INDEX: 31.57 KG/M2 | DIASTOLIC BLOOD PRESSURE: 66 MMHG | SYSTOLIC BLOOD PRESSURE: 106 MMHG

## 2018-10-01 DIAGNOSIS — Z01.419 WELL WOMAN EXAM WITH ROUTINE GYNECOLOGICAL EXAM: ICD-10-CM

## 2018-10-01 DIAGNOSIS — Z12.31 ENCOUNTER FOR SCREENING MAMMOGRAM FOR MALIGNANT NEOPLASM OF BREAST: ICD-10-CM

## 2018-10-01 DIAGNOSIS — N60.11 FIBROCYSTIC BREAST CHANGES OF BOTH BREASTS: ICD-10-CM

## 2018-10-01 DIAGNOSIS — Z01.419 WELL WOMAN EXAM WITH ROUTINE GYNECOLOGICAL EXAM: Primary | ICD-10-CM

## 2018-10-01 DIAGNOSIS — N60.12 FIBROCYSTIC BREAST CHANGES OF BOTH BREASTS: ICD-10-CM

## 2018-10-01 DIAGNOSIS — Z12.4 ROUTINE CERVICAL SMEAR: ICD-10-CM

## 2018-10-01 DIAGNOSIS — H52.03 HYPERMETROPIA OF BOTH EYES: Primary | ICD-10-CM

## 2018-10-01 DIAGNOSIS — Z00.00 ROUTINE GENERAL MEDICAL EXAMINATION AT A HEALTH CARE FACILITY: Primary | ICD-10-CM

## 2018-10-01 PROBLEM — E66.9 OBESITY (BMI 30-39.9): Status: RESOLVED | Noted: 2017-07-17 | Resolved: 2018-10-01

## 2018-10-01 PROBLEM — G89.29 CHRONIC RIGHT-SIDED LOW BACK PAIN WITH RIGHT-SIDED SCIATICA: Status: RESOLVED | Noted: 2017-08-29 | Resolved: 2018-10-01

## 2018-10-01 PROBLEM — M54.41 CHRONIC RIGHT-SIDED LOW BACK PAIN WITH RIGHT-SIDED SCIATICA: Status: RESOLVED | Noted: 2017-08-29 | Resolved: 2018-10-01

## 2018-10-01 PROCEDURE — 92014 COMPRE OPH EXAM EST PT 1/>: CPT | Mod: S$GLB,,, | Performed by: OPTOMETRIST

## 2018-10-01 PROCEDURE — 99395 PREV VISIT EST AGE 18-39: CPT | Mod: S$GLB,,, | Performed by: OBSTETRICS & GYNECOLOGY

## 2018-10-01 PROCEDURE — 92015 DETERMINE REFRACTIVE STATE: CPT | Mod: S$GLB,,, | Performed by: OPTOMETRIST

## 2018-10-01 PROCEDURE — 77067 SCR MAMMO BI INCL CAD: CPT | Mod: TC

## 2018-10-01 PROCEDURE — 88141 CYTOPATH C/V INTERPRET: CPT | Mod: ,,, | Performed by: PATHOLOGY

## 2018-10-01 PROCEDURE — 99395 PREV VISIT EST AGE 18-39: CPT | Mod: S$GLB,,, | Performed by: INTERNAL MEDICINE

## 2018-10-01 PROCEDURE — 99999 PR PBB SHADOW E&M-EST. PATIENT-LVL III: CPT | Mod: PBBFAC,,, | Performed by: OBSTETRICS & GYNECOLOGY

## 2018-10-01 PROCEDURE — 99999 PR PBB SHADOW E&M-EST. PATIENT-LVL III: CPT | Mod: PBBFAC,,, | Performed by: INTERNAL MEDICINE

## 2018-10-01 PROCEDURE — 77067 SCR MAMMO BI INCL CAD: CPT | Mod: 26,,, | Performed by: RADIOLOGY

## 2018-10-01 PROCEDURE — 99999 PR PBB SHADOW E&M-EST. PATIENT-LVL II: CPT | Mod: PBBFAC,,, | Performed by: OPTOMETRIST

## 2018-10-01 PROCEDURE — 88142 CYTOPATH C/V THIN LAYER: CPT | Performed by: PATHOLOGY

## 2018-10-01 NOTE — PROGRESS NOTES
"OBSTETRIC HISTORY:   OB History      Para Term  AB Living    0              SAB TAB Ectopic Multiple Live Births                      COMPREHENSIVE GYN HISTORY:  PAP History: Denies abnormal Paps.  Infection History: Denies STDs. Denies PID.  Benign History: Denies uterine fibroids. Denies ovarian cysts. Denies endometriosis.   Cancer History: Denies cervical cancer. Denies uterine cancer or hyperplasia. Denies ovarian cancer. Denies vulvar cancer or pre-cancer. Denies vaginal cancer or pre-cancer. Denies breast cancer. Denies colon cancer.  Sexual Activity History:   reports that she does not engage in sexual activity.   Menstrual History: Every 21-23 days, flows for 5 days. Moderate to heavy flow.  Dysmenorrhea History: Reports severe dysmenorrhea. (takes Excedrin)  Contraception: None       HPI:   38 y.o.  Patient's last menstrual period was 2018.   Patient is  here for her annual gynecologic exam.  She has complaints of left breast tenderness. She denies bladder and bowel complaints.    ROS:  GENERAL: Denies weight gain or weight loss. Feeling well overall.   SKIN: Denies rash or lesions.   HEAD: Denies headache.   NODES: Denies enlarged lymph nodes.   CHEST: Denies shortness of breath.   ABDOMEN: No abdominal pain, constipation, diarrhea, nausea, vomiting or rectal bleeding.   URINARY: No frequency, dysuria, hematuria, or burning on urination.  REPRODUCTIVE: See HPI.   BREASTS: The patient denies pain, lumps, or nipple discharge.   HEMATOLOGIC: No easy bruisability.   MUSCULOSKELETAL: Denies joint pain or back pain.   NEUROLOGIC: Denies weakness.   PSYCHIATRIC: Denies depression, anxiety or mood swings.    PE:   /66 (BP Location: Left arm)   Ht 5' 4" (1.626 m)   Wt 83.9 kg (184 lb 15.5 oz)   LMP 2018   BMI 31.75 kg/m²   APPEARANCE: Well nourished, well developed, in no acute distress.  NECK: Neck symmetric without  thyromegaly.  NODES: No inguinal, cervical lymph node " enlargement.  CHEST: Lungs clear to auscultation.  HEART: Regular rate and rhythm, no murmurs, rubs or gallops.  ABDOMEN: Soft. No tenderness or masses. No hernias.  BREASTS: Symmetrical, no skin changes or visible lesions. No palpable masses, nipple discharge or adenopathy bilaterally. Bilateral fibrocystic changes  PELVIC:   VULVA: No lesions. Normal female genitalia.  URETHRAL MEATUS: Normal size and location, no lesions, no prolapse.  URETHRA: No masses, tenderness, prolapse or scarring.  VAGINA: Moist and well rugated, no discharge, no significant cystocele or rectocele.  CERVIX: No lesions and discharge.  UTERUS: Normal size, regular shape, mobile, non-tender, bladder base nontender.  ADNEXA: No masses or tenderness.    PROCEDURES:  Pap smear --brush only with speculum    Assessment:  Normal Gynecologic Exam--MMG    Plan:  Mammogram and Colonoscopy if indicated by current recommendations.  Return to clinic in one year or for any problems or complaints.    Counseling:  Patient was counseled today on A.C.S. Pap guidelines and recommendations for yearly pelvic exams and monthly self breast exams; to see her PCP for other health maintenance. Regular exercise and healthy diet.

## 2018-10-01 NOTE — PROGRESS NOTES
"Chief Complaint: Annual exam     HPI: this is a 38 year old woman who presents for an annual exam.     She generally feels well.  Energy level is fine.      She had a benign breast biopsy 6/2016 on the right the breast. Repeat MMG 12/16 fine - she saw her GYN today and has a mammogram ordered. Breast exam today was fine.      Sinus congestion comes and goes. NO fever, chills, ear pain, cough, shortness of breath, nausea, vomiting, constipation, diarreha. She takes Fioricet as needed for severe headache which helps.  She has not had a severe headache in a long time.      SHe completed the healthy back program. Back is doing well.              Past Medical History     Diagnosis   Date        Thyroid nodule                  Past Surgical History     Procedure   Date        [place] tooth extraction          Meds and allergies: updated on EPIC     Social History and Family History:updated on epic     Review of Systems:  General: No fever, chills, night sweats, weight loss, fatigue  HEENT: No visual changes, diplopia, hearing loss, sinus congestion, sore throat, post nasal drip  Resp: No cough, shortness of breath, dyspnea on exertion  Cardiovascular: No chest pain, palpitations, orthopnea, PND, edema  GI: No nausea, vomiting, constipation, diarrhea, melana, bloody stools, heartburn  : No dysuria, hematuria, incontinence, incomplete void, dribbling, nocturia.  Musculoskeletal: No joint pain, muscle pain  Neuro: No headaches, seizures, numbness  Endocrine: No polydipsia, polyuria, hot or cold intolerance.  Heme: No anemia, easy bruising.  Skin: No rashes, hairloss.  Psych: No anxiety, depression, or insomnia  Breasts: No abnormalities  GYN: Menstrual cramping is better with toradol. Lasts 5 days. Once a month     Physical exam:   /68 (BP Location: Left arm, Patient Position: Sitting, BP Method: Medium (Manual))   Pulse 68   Ht 5' 3" (1.6 m)   Wt 84 kg (185 lb 3.2 oz)   LMP 09/12/2018   BMI 32.81 kg/m² "     General: alert, oriented x 3, no apparent distress. Affect normal  HEENT: Conjunctivae: anicteric, PERRL, EOMI, TM red fluid, Oralpharynx clear  Neck: supple, no thyroid enlargement, no cervical lymphadenopathy  Resp: effort normal, lungs clear bilaterally  CV: Regular rate and rhythm without murmurs, gallops or rubs, no lower extremity edema  ABDOMEN: soft, non distended, non tender, bowel sounds present, no hepatosplenomgaly        GYN 5/16  Eye exam - 4/2015     Assessment:/plan  Annual exam  Migraine - continue Fioricet as needed  Thyroid nodule - ultrasound in 2017 unrmarkable - due 2020 or 2021  Continue healthy back  Fasting labs   Safety and diet discussed  Return in one year for physical, sooner if issues.

## 2018-10-01 NOTE — PROGRESS NOTES
"ISABELLA Raines "Obi" Varghese is a 38 y.o. female who returns  for continued eye   care.  Her last exam with me was 6/15/17.  She has astigmatism. She was   prescribed glasses for use as needed.  She still uses the glasses for   computer work.  She now reports that her distance vision has gotten a   little blurry (a slight haze around edge of images).     (--)blurred vision  (--)Headaches  (--)diplopia  (--)flashes  (--)floaters  (--)pain  (--)Itching  (--)tearing  (--)burning  (--)Dryness  (--) OTC Drops  (--)Photophobia    Last edited by Sergio Soto, OD on 10/1/2018  1:48 PM. (History)        Review of Systems   Constitutional: Negative for chills, fever and malaise/fatigue.   HENT: Negative for congestion and hearing loss.    Eyes: Positive for blurred vision. Negative for double vision, photophobia, pain, discharge and redness.   Respiratory: Negative.    Cardiovascular: Negative.    Gastrointestinal: Negative.    Genitourinary: Negative.    Musculoskeletal: Negative.    Skin: Negative.    Neurological: Negative for seizures.   Endo/Heme/Allergies: Negative for environmental allergies.   Psychiatric/Behavioral: Negative.        Assessment /Plan     For exam results, see Encounter Report.    1. Latent Hyperopia  - Early presbyopia  - Spec Rx per final Rx below for use full time or part time near  Glasses Prescription (10/1/2018)        Sphere Cylinder    Right +0.75 Sphere    Left +0.75 Sphere    Type:  SVL    Expiration Date:  10/2/2019        2. Good ocular Health OU      Patient education; RTC in 1 year with DFE, sooner prn                 "

## 2018-10-05 ENCOUNTER — PATIENT MESSAGE (OUTPATIENT)
Dept: OBSTETRICS AND GYNECOLOGY | Facility: CLINIC | Age: 38
End: 2018-10-05

## 2018-10-08 ENCOUNTER — PATIENT MESSAGE (OUTPATIENT)
Dept: OBSTETRICS AND GYNECOLOGY | Facility: CLINIC | Age: 38
End: 2018-10-08

## 2018-10-08 ENCOUNTER — TELEPHONE (OUTPATIENT)
Dept: OBSTETRICS AND GYNECOLOGY | Facility: CLINIC | Age: 38
End: 2018-10-08

## 2018-11-05 ENCOUNTER — PATIENT MESSAGE (OUTPATIENT)
Dept: INTERNAL MEDICINE | Facility: CLINIC | Age: 38
End: 2018-11-05

## 2018-11-05 ENCOUNTER — OFFICE VISIT (OUTPATIENT)
Dept: INTERNAL MEDICINE | Facility: CLINIC | Age: 38
End: 2018-11-05
Payer: COMMERCIAL

## 2018-11-05 VITALS
SYSTOLIC BLOOD PRESSURE: 110 MMHG | BODY MASS INDEX: 33.48 KG/M2 | HEIGHT: 63 IN | DIASTOLIC BLOOD PRESSURE: 89 MMHG | WEIGHT: 188.94 LBS | HEART RATE: 89 BPM | TEMPERATURE: 99 F | OXYGEN SATURATION: 99 %

## 2018-11-05 DIAGNOSIS — B37.0 ORAL THRUSH: Primary | ICD-10-CM

## 2018-11-05 DIAGNOSIS — K13.70 UNSPECIFIED LESIONS OF ORAL MUCOSA: ICD-10-CM

## 2018-11-05 PROCEDURE — 87070 CULTURE OTHR SPECIMN AEROBIC: CPT

## 2018-11-05 PROCEDURE — 3008F BODY MASS INDEX DOCD: CPT | Mod: CPTII,S$GLB,, | Performed by: NURSE PRACTITIONER

## 2018-11-05 PROCEDURE — 99213 OFFICE O/P EST LOW 20 MIN: CPT | Mod: S$GLB,,, | Performed by: NURSE PRACTITIONER

## 2018-11-05 PROCEDURE — 99999 PR PBB SHADOW E&M-EST. PATIENT-LVL V: CPT | Mod: PBBFAC,,, | Performed by: NURSE PRACTITIONER

## 2018-11-05 RX ORDER — NYSTATIN 100000 [USP'U]/ML
SUSPENSION ORAL
Qty: 240 ML | Refills: 0 | Status: SHIPPED | OUTPATIENT
Start: 2018-11-05 | End: 2018-12-18

## 2018-11-06 ENCOUNTER — PATIENT MESSAGE (OUTPATIENT)
Dept: INTERNAL MEDICINE | Facility: CLINIC | Age: 38
End: 2018-11-06

## 2018-11-06 DIAGNOSIS — K13.79 OTHER LESIONS OF ORAL MUCOSA: Primary | ICD-10-CM

## 2018-11-06 NOTE — PROGRESS NOTES
I attest that this patient was seen and evaluated by Clemente Mccarthy, FARSHAD, FNP-S, then presented to me. I then examined the patient independently and together we agreed on a diagnosis and treatment plan which was then presented to the patient. See his note dated today for full visit information.    Subjective:       Patient ID: Yanna Kwong is a 38 y.o. female.     Chief Complaint: Oral Pain     Oral Pain    Pertinent negatives include no fever.   Pt reports nonpainful spots in the mouth as well as to the right shoulder an the right ankle.  States dentist reported she needed to have them examined.  Denies pain.  Has not tried interventions for this problem.     Review of Systems   Constitutional: Negative for chills and fever.   HENT: Negative for congestion, ear pain, rhinorrhea, sneezing, sore throat and voice change.    Eyes: Negative for pain, discharge, redness, itching and visual disturbance.   Respiratory: Negative for cough, shortness of breath and wheezing.    Cardiovascular: Negative for chest pain, palpitations and leg swelling.   Gastrointestinal: Negative for constipation, diarrhea, nausea and vomiting.   Endocrine: Negative for polydipsia, polyphagia and polyuria.   Genitourinary: Negative for dysuria, frequency, hematuria, urgency, vaginal bleeding, vaginal discharge and vaginal pain.   Musculoskeletal: Negative for arthralgias and myalgias.   Skin: Positive for rash. Negative for wound.   Neurological: Negative for dizziness, weakness and light-headedness.   Hematological: Negative for adenopathy. Does not bruise/bleed easily.       Objective:     Physical Exam   Constitutional: She is oriented to person, place, and time. She appears well-developed and well-nourished. No distress.   HENT:   Head: Normocephalic and atraumatic.   Right Ear: External ear normal.   Left Ear: External ear normal.   Nose: Nose normal.   Mouth/Throat: Oropharynx is clear and moist. Oral lesions present.    isloated macule to the right shoulder and right ankle   Eyes: Conjunctivae and EOM are normal. Pupils are equal, round, and reactive to light. Right eye exhibits no discharge. Left eye exhibits no discharge. No scleral icterus.   Neck: Normal range of motion.   Cardiovascular: Normal rate, regular rhythm and normal heart sounds. Exam reveals no gallop and no friction rub.   No murmur heard.  Pulmonary/Chest: Effort normal and breath sounds normal. No respiratory distress. She has no wheezes. She has no rales. She exhibits no tenderness.   Abdominal: Soft. Bowel sounds are normal. She exhibits no distension.   Musculoskeletal: Normal range of motion. She exhibits no edema, tenderness or deformity.   Neurological: She is alert and oriented to person, place, and time.   Skin: Skin is warm and dry. Capillary refill takes less than 2 seconds. She is not diaphoretic.   Psychiatric: She has a normal mood and affect.   Nursing note and vitals reviewed.     Assessment:       1. Oral thrush    2. Unspecified lesions of oral mucosa         Plan:       Yanna was seen today for oral pain.    Diagnoses and all orders for this visit:    Oral thrush  -     nystatin (MYCOSTATIN) 100,000 unit/mL suspension; Swish and sip 5 ml 4 times daily x 7 days    Unspecified lesions of oral mucosa  -     Throat culture    DDX includes oral lichen planus.  Pending cultures and trial of antifungal for presumed thrush. If no improvement will consider steroids.

## 2018-11-06 NOTE — MEDICAL/APP STUDENT
Subjective:       Patient ID: Yanna Kwong is a 38 y.o. female.    Chief Complaint: Oral Pain    Oral Pain    Pertinent negatives include no fever.   Pt reports nonpainful spots in the mouth as well as to the right shoulder an the right ankle.  States dentist reported she needed to have them examined.  Denies pain.  Has not tried interventions for this problem.    Review of Systems   Constitutional: Negative for chills and fever.   HENT: Negative for congestion, ear pain, rhinorrhea, sneezing, sore throat and voice change.    Eyes: Negative for pain, discharge, redness, itching and visual disturbance.   Respiratory: Negative for cough, shortness of breath and wheezing.    Cardiovascular: Negative for chest pain, palpitations and leg swelling.   Gastrointestinal: Negative for constipation, diarrhea, nausea and vomiting.   Endocrine: Negative for polydipsia, polyphagia and polyuria.   Genitourinary: Negative for dysuria, frequency, hematuria, urgency, vaginal bleeding, vaginal discharge and vaginal pain.   Musculoskeletal: Negative for arthralgias and myalgias.   Skin: Positive for rash. Negative for wound.   Neurological: Negative for dizziness, weakness and light-headedness.   Hematological: Negative for adenopathy. Does not bruise/bleed easily.       Objective:        Physical Exam   Constitutional: She is oriented to person, place, and time. She appears well-developed and well-nourished. No distress.   HENT:   Head: Normocephalic and atraumatic.   Right Ear: External ear normal.   Left Ear: External ear normal.   Nose: Nose normal.   Mouth/Throat: Oropharynx is clear and moist. Oral lesions present.   isloated macule to the right shoulder and right ankle   Eyes: Conjunctivae and EOM are normal. Pupils are equal, round, and reactive to light. Right eye exhibits no discharge. Left eye exhibits no discharge. No scleral icterus.   Neck: Normal range of motion.   Cardiovascular: Normal rate, regular rhythm  and normal heart sounds. Exam reveals no gallop and no friction rub.   No murmur heard.  Pulmonary/Chest: Effort normal and breath sounds normal. No respiratory distress. She has no wheezes. She has no rales. She exhibits no tenderness.   Abdominal: Soft. Bowel sounds are normal. She exhibits no distension.   Musculoskeletal: Normal range of motion. She exhibits no edema, tenderness or deformity.   Neurological: She is alert and oriented to person, place, and time.   Skin: Skin is warm and dry. Capillary refill takes less than 2 seconds. She is not diaphoretic.   Psychiatric: She has a normal mood and affect.   Nursing note and vitals reviewed.    Assessment:       No diagnosis found.    Plan:        Recommend [facility] [place] diagnosis, throat culture.

## 2018-11-07 ENCOUNTER — OFFICE VISIT (OUTPATIENT)
Dept: OTOLARYNGOLOGY | Facility: CLINIC | Age: 38
End: 2018-11-07
Payer: COMMERCIAL

## 2018-11-07 VITALS
BODY MASS INDEX: 33.04 KG/M2 | WEIGHT: 186.5 LBS | TEMPERATURE: 98 F | HEART RATE: 85 BPM | SYSTOLIC BLOOD PRESSURE: 108 MMHG | DIASTOLIC BLOOD PRESSURE: 59 MMHG

## 2018-11-07 DIAGNOSIS — Z87.19 HISTORY OF ORAL APHTHOUS ULCERS: ICD-10-CM

## 2018-11-07 PROCEDURE — 99204 OFFICE O/P NEW MOD 45 MIN: CPT | Mod: 25,S$GLB,, | Performed by: OTOLARYNGOLOGY

## 2018-11-07 PROCEDURE — 3008F BODY MASS INDEX DOCD: CPT | Mod: CPTII,S$GLB,, | Performed by: OTOLARYNGOLOGY

## 2018-11-07 PROCEDURE — 31575 DIAGNOSTIC LARYNGOSCOPY: CPT | Mod: S$GLB,,, | Performed by: OTOLARYNGOLOGY

## 2018-11-07 PROCEDURE — 99999 PR PBB SHADOW E&M-EST. PATIENT-LVL III: CPT | Mod: PBBFAC,,, | Performed by: OTOLARYNGOLOGY

## 2018-11-07 RX ORDER — KETOROLAC TROMETHAMINE 10 MG/1
TABLET, FILM COATED ORAL
Qty: 18 TABLET | Refills: 0 | Status: SHIPPED | OUTPATIENT
Start: 2018-11-07 | End: 2019-02-13 | Stop reason: SDUPTHER

## 2018-11-07 NOTE — PROGRESS NOTES
Head and Neck Surgery Clinic Note    CC: mouth pain    HPI: Yanna Kwong is a 38 y.o. female presenting with throat pain and odynophagia radiating to ears since Sunday. Has a mouthguard for sleep and feels it's sore along her ascending rami as well. Lifelong nonosmoker, no history of recurrent canker sores or aphthous ulcers. Describes pain as sharp and not alleviated by Nystatin.     Past Medical History:   Diagnosis Date    Thyroid nodule        Past Surgical History:   Procedure Laterality Date    BREAST BIOPSY Right 2016    core    WISDOM TOOTH EXTRACTION           Current Outpatient Medications:     ascorbic acid (VITAMIN C) 1000 MG tablet, Take 1,000 mg by mouth once daily., Disp: , Rfl:     b complex vitamins capsule, Take 1 capsule by mouth once daily., Disp: , Rfl:     BIOTIN ORAL, Take 1 tablet by mouth once daily., Disp: , Rfl:     fish oil-omega-3 fatty acids 300-1,000 mg capsule, Take 1 g by mouth once daily., Disp: , Rfl:     folic acid (FOLVITE) 400 MCG tablet, Take 400 mcg by mouth once daily., Disp: , Rfl:     ketorolac (TORADOL) 10 mg tablet, TAKE 1 TABLET BY MOUTH EVERY 8 HOURS .  USE  ONLY  3  DAYS  PER  MONTH, Disp: 18 tablet, Rfl: 0    multivitamin (ONE DAILY MULTIVITAMIN) per tablet, Take 1 tablet by mouth once daily., Disp: , Rfl:     nystatin (MYCOSTATIN) 100,000 unit/mL suspension, Swish and sip 5 ml 4 times daily x 7 days, Disp: 240 mL, Rfl: 0    vitamin E 400 UNIT capsule, Take 400 Units by mouth once daily., Disp: , Rfl:     Review of patient's allergies indicates:   Allergen Reactions    Vicodin [hydrocodone-acetaminophen] Other (See Comments)     tongue    Erythromycin Itching and Rash    Ilosone Rash       Family History   Problem Relation Age of Onset    Heart disease Mother     Cataracts Father     Macular degeneration Father     Hypertension Paternal Grandfather     Prostate cancer Paternal Grandfather     Diabetes Paternal Grandmother     Heart  disease Paternal Grandmother     Heart attack Paternal Grandmother     Hypertension Maternal Grandmother     Hypertension Maternal Grandfather     Heart failure Maternal Grandfather     Heart attack Maternal Grandfather     Colon cancer Maternal Uncle 67    Stroke Maternal Uncle     Atrial fibrillation Maternal Aunt     Hypertension Maternal Aunt     Heart attack Cousin     Amblyopia Neg Hx     Blindness Neg Hx     Glaucoma Neg Hx     Retinal detachment Neg Hx     Strabismus Neg Hx     Thyroid disease Neg Hx     Breast cancer Neg Hx     Ovarian cancer Neg Hx        Social History     Socioeconomic History    Marital status: Single     Spouse name: Not on file    Number of children: Not on file    Years of education: Not on file    Highest education level: Not on file   Social Needs    Financial resource strain: Not on file    Food insecurity - worry: Not on file    Food insecurity - inability: Not on file    Transportation needs - medical: Not on file    Transportation needs - non-medical: Not on file   Occupational History     Employer: [employer]   Tobacco Use    Smoking status: Never Smoker    Smokeless tobacco: Never Used   Substance and Sexual Activity    Alcohol use: No    Drug use: No    Sexual activity: Yes     Partners: Male     Comment: no penetration    Other Topics Concern    Not on file   Social History Narrative    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  Eyes: Denies blurred vision or double vision.  Respiratory: Denies symptoms of shortness of breath, noisy breathing, hoarseness or chronic cough.  GI: Denies symptoms of heartburn, acid regurgitation, or the known presence of a hiatal hernia.  The remainder of a 10-point review of  systems is negative    PERSONAL REVIEW OF RADIOLOGICAL FILMS AND RECORDS:  Noncontributory    PERSONAL REVIEW OF LABORATORY VALUES:  Noncontributory    PHYSICAL EXAM:  Vitals - BP (!) 108/59   Pulse 85   Temp 97.9 °F (36.6 °C)   Wt 84.6 kg (186 lb 8.2 oz)   BMI 33.04 kg/m²   Constitutional -      General Appearance: well developed, well nourished, without obvious deformities     Communication: speaks with a normal voice without hoarseness  Head & Face -     Overall: no obvious scars, lesions or masses     Parotid and submandibular glands: no masses or tenderness     Facial strength: normal and equal bilaterally  Eyes -      EOM intact  Ear, Nose, Mouth & Throat -     Ears: both left and right external auditory canals and TM's are normal, no external deformities     Nasal exam: mucosa is pink, septum is midline, visible turbinates are normal on anterior rhinoscopy     Mastication: teeth appear in good repair     Oral Cavity and oropharynx: mucosa, hard and soft palates, tongue, posterior pharyngeal wall, lips and gums are without lesions except for an aphthous ulcer of her uvula, and excoriations of bilateral ascending rami/RMT consistent with abrasion from mouthguard. Tonsils appear 1+  Respiratory:     Breathing unlabored, no stridor  Cardiovascular:     No JVD, capillary refill normal  Larynx: using the mirror for indirect laryngoscopy, the epiglottic, false cords, true cords, and pyriform sinuses are without lesions and the true vocal cords move normally  Neck: appears symmetric, and on palpation is without masses   Endocrine:     Thyroid: no asymmetry, thyromegaly, or thyroid nodules on palpation  Lymphatic:     No cervical lymphadenopathy  Cranial Nerves:      II: Pupillary reflexes normal     III, IV, VI: EOM normal     V: 1,2,3: normal sensation     VII: Normal strength in all divisions     IX, X: Normal voice, palatal elevation and sensation     XI: Shoulder strength normal       XII: Tongue mobility  normal  Psychiatric:     Appropriate affect    FLEXIBLE LARYNGOSCOPY     Indications:  odynophagia     Procedure:  With the patient sitting upright, informed consent was obtained.  Topical anesthesia and vasoconstriction was applied to both nares.  After waiting an appropriate period of time for anesthesia/vasoconstriction to become effective, a flexible laryngoscope was passed through the R side(s) of the nose and the nose, nasopharynx, oropharynx, hypopharynx and larynx were examined.  The patient tolerated the procedure without complications.     Findings: The nasal passageways and nasopharynx appear normal without edema or erythema.  The oropharynx, base of tongue, piriform sinuses, epiglottis, and aryepiglottic folds were examined and no masses, lesions, or ulcerations were seen.  The true vocal cords move well to midline bilaterally.  The airway is widely patent.      ASSESSMENT: aphthous ulcer    PLAN: Topical gentian violet applied. Advised to refrain from mouthguard use until it is resized. Should these symptoms not resolve once the aphthous ulcer has run its course will [facility]. No evidence of neoplasm or malignancy.        Kelechi Mendiola

## 2018-11-08 LAB — BACTERIA THROAT CULT: NORMAL

## 2018-12-07 ENCOUNTER — OFFICE VISIT (OUTPATIENT)
Dept: OBSTETRICS AND GYNECOLOGY | Facility: CLINIC | Age: 38
End: 2018-12-07
Payer: COMMERCIAL

## 2018-12-07 VITALS
BODY MASS INDEX: 34.5 KG/M2 | SYSTOLIC BLOOD PRESSURE: 104 MMHG | HEIGHT: 63 IN | WEIGHT: 194.69 LBS | DIASTOLIC BLOOD PRESSURE: 72 MMHG

## 2018-12-07 DIAGNOSIS — Z12.4 ROUTINE CERVICAL SMEAR: ICD-10-CM

## 2018-12-07 DIAGNOSIS — R87.615 PAP SMEAR OF CERVIX UNSATISFACTORY: Primary | ICD-10-CM

## 2018-12-07 PROCEDURE — 88175 CYTOPATH C/V AUTO FLUID REDO: CPT

## 2018-12-07 PROCEDURE — 3008F BODY MASS INDEX DOCD: CPT | Mod: CPTII,S$GLB,, | Performed by: OBSTETRICS & GYNECOLOGY

## 2018-12-07 PROCEDURE — 99213 OFFICE O/P EST LOW 20 MIN: CPT | Mod: S$GLB,,, | Performed by: OBSTETRICS & GYNECOLOGY

## 2018-12-07 PROCEDURE — 99999 PR PBB SHADOW E&M-EST. PATIENT-LVL III: CPT | Mod: PBBFAC,,, | Performed by: OBSTETRICS & GYNECOLOGY

## 2018-12-07 NOTE — PROGRESS NOTES
"OBSTETRIC HISTORY:   OB History      Para Term  AB Living    0   0          SAB TAB Ectopic Multiple Live Births                      COMPREHENSIVE GYN HISTORY:  PAP History: Denies abnormal Paps.  Infection History: Denies STDs. Denies PID.  Benign History: Denies uterine fibroids. Denies ovarian cysts. Denies endometriosis.   Cancer History: Denies cervical cancer. Denies uterine cancer or hyperplasia. Denies ovarian cancer. Denies vulvar cancer or pre-cancer. Denies vaginal cancer or pre-cancer. Denies breast cancer. Denies colon cancer.  Sexual Activity History:   reports that she does not engage in sexual activity.   Menstrual History: Every 21-23 days, flows for 5 days. Moderate to heavy flow.  Dysmenorrhea History: Reports severe dysmenorrhea. (takes Excedrin)  Contraception: None         HPI:   38 y.o.  Patient's last menstrual period was 2018.   Patient is  here for follow up of unsatisfactory Pap smears. She denies bladder, breast and bowel complaints.    ROS:  GENERAL: Denies weight gain or weight loss. Feeling well overall.   SKIN: Denies rash or lesions.   HEAD: Denies headache.   NODES: Denies enlarged lymph nodes.   CHEST: Denies shortness of breath.   ABDOMEN: No abdominal pain, constipation, diarrhea, nausea, vomiting or rectal bleeding.   URINARY: No frequency, dysuria, hematuria, or burning on urination.  REPRODUCTIVE: See HPI.   BREASTS: The patient denies pain, lumps, or nipple discharge.   HEMATOLOGIC: No easy bruisability.   MUSCULOSKELETAL: Denies joint pain or back pain.   NEUROLOGIC: Denies weakness.   PSYCHIATRIC: Denies depression, anxiety or mood swings.    PE:   /72   Ht 5' 3" (1.6 m)   Wt 88.3 kg (194 lb 10.7 oz)   LMP 2018   BMI 34.48 kg/m²   APPEARANCE: Well nourished, well developed, in no acute distress.  ABDOMEN: Soft. No tenderness or masses. No hernias.  PELVIC:   VULVA: No lesions. Normal female genitalia.  URETHRAL MEATUS: Normal size and " location, no lesions, no prolapse.  URETHRA: No masses, tenderness, prolapse or scarring.  VAGINA: Moist and well rugated, no discharge, no significant cystocele or rectocele.  CERVIX: No lesions and discharge.  UTERUS: Normal size, regular shape, mobile, non-tender, bladder base nontender.  ADNEXA: No masses or tenderness.    ASSESSMENT:  Unsatisfactory Pap      PLAN:  RTO for annual

## 2018-12-12 ENCOUNTER — PATIENT MESSAGE (OUTPATIENT)
Dept: OBSTETRICS AND GYNECOLOGY | Facility: CLINIC | Age: 38
End: 2018-12-12

## 2018-12-14 ENCOUNTER — OFFICE VISIT (OUTPATIENT)
Dept: OBSTETRICS AND GYNECOLOGY | Facility: CLINIC | Age: 38
End: 2018-12-14
Payer: COMMERCIAL

## 2018-12-14 VITALS
DIASTOLIC BLOOD PRESSURE: 68 MMHG | HEIGHT: 63 IN | WEIGHT: 190.5 LBS | BODY MASS INDEX: 33.75 KG/M2 | SYSTOLIC BLOOD PRESSURE: 122 MMHG

## 2018-12-14 DIAGNOSIS — L72.0 EIC (EPIDERMAL INCLUSION CYST): Primary | ICD-10-CM

## 2018-12-14 PROCEDURE — 99213 OFFICE O/P EST LOW 20 MIN: CPT | Mod: S$GLB,,, | Performed by: OBSTETRICS & GYNECOLOGY

## 2018-12-14 PROCEDURE — 99999 PR PBB SHADOW E&M-EST. PATIENT-LVL III: CPT | Mod: PBBFAC,,, | Performed by: OBSTETRICS & GYNECOLOGY

## 2018-12-14 PROCEDURE — 3008F BODY MASS INDEX DOCD: CPT | Mod: CPTII,S$GLB,, | Performed by: OBSTETRICS & GYNECOLOGY

## 2018-12-14 NOTE — PROGRESS NOTES
"OBSTETRIC HISTORY:   OB History      Para Term  AB Living    0   0          SAB TAB Ectopic Multiple Live Births                      COMPREHENSIVE GYN HISTORY:  PAP History: Denies abnormal Paps.  Infection History: Denies STDs. Denies PID.  Benign History: Denies uterine fibroids. Denies ovarian cysts. Denies endometriosis.   Cancer History: Denies cervical cancer. Denies uterine cancer or hyperplasia. Denies ovarian cancer. Denies vulvar cancer or pre-cancer. Denies vaginal cancer or pre-cancer. Denies breast cancer. Denies colon cancer.  Sexual Activity History:   reports that she does not engage in sexual activity.   Menstrual History: Every 21-23 days, flows for 5 days. Moderate to heavy flow.  Dysmenorrhea History: Reports severe dysmenorrhea. (takes Excedrin)  Contraception: None         HPI:   38 y.o.  Patient's last menstrual period was 2018.   Patient is  here complaining of rash on right inner thigh and bump on vulva. She denies bladder, breast and bowel complaints.    ROS:  GENERAL: Denies weight gain or weight loss. Feeling well overall.   SKIN: Denies rash or lesions.   HEAD: Denies headache.   NODES: Denies enlarged lymph nodes.   CHEST: Denies shortness of breath.   ABDOMEN: No abdominal pain, constipation, diarrhea, nausea, vomiting or rectal bleeding.   URINARY: No frequency, dysuria, hematuria, or burning on urination.  REPRODUCTIVE: See HPI.   BREASTS: The patient denies pain, lumps, or nipple discharge.   HEMATOLOGIC: No easy bruisability.   MUSCULOSKELETAL: Denies joint pain or back pain.   NEUROLOGIC: Denies weakness.   PSYCHIATRIC: Denies depression, anxiety or mood swings.    PE:   /68   Ht 5' 3" (1.6 m)   Wt 86.4 kg (190 lb 7.6 oz)   LMP 2018   BMI 33.74 kg/m²   APPEARANCE: Well nourished, well developed, in no acute distress.  ABDOMEN: Soft. No tenderness or masses. No hernias.  PELVIC:   VULVA: In left mons area 0.5cm well circumscribed lesion most " likely EIC. Right thigh with some hyperkeratosis from chafing. Normal female genitalia.  URETHRAL MEATUS: Normal size and location, no lesions, no prolapse.  URETHRA: No masses, tenderness, prolapse or scarring.  VAGINA: Moist and well rugated, physiologic discharge, no significant cystocele or rectocele.  CERVIX: No lesions and discharge.  UTERUS: Normal size, regular shape, mobile, non-tender, bladder base nontender.  ADNEXA: No masses or tenderness.    ASSESSMENT:  EIC    PLAN:  RTO prn

## 2018-12-17 ENCOUNTER — PATIENT MESSAGE (OUTPATIENT)
Dept: INTERNAL MEDICINE | Facility: CLINIC | Age: 38
End: 2018-12-17

## 2018-12-18 ENCOUNTER — OFFICE VISIT (OUTPATIENT)
Dept: INTERNAL MEDICINE | Facility: CLINIC | Age: 38
End: 2018-12-18
Payer: COMMERCIAL

## 2018-12-18 ENCOUNTER — HOSPITAL ENCOUNTER (OUTPATIENT)
Dept: RADIOLOGY | Facility: HOSPITAL | Age: 38
Discharge: HOME | End: 2018-12-18
Attending: NURSE PRACTITIONER
Payer: COMMERCIAL

## 2018-12-18 ENCOUNTER — TELEPHONE (OUTPATIENT)
Dept: INTERNAL MEDICINE | Facility: CLINIC | Age: 38
End: 2018-12-18

## 2018-12-18 VITALS
DIASTOLIC BLOOD PRESSURE: 72 MMHG | TEMPERATURE: 98 F | WEIGHT: 191.13 LBS | HEART RATE: 78 BPM | BODY MASS INDEX: 33.87 KG/M2 | OXYGEN SATURATION: 98 % | HEIGHT: 63 IN | SYSTOLIC BLOOD PRESSURE: 112 MMHG

## 2018-12-18 DIAGNOSIS — M94.0 COSTAL CHONDRITIS: Primary | ICD-10-CM

## 2018-12-18 DIAGNOSIS — M94.0 COSTAL CHONDRITIS: ICD-10-CM

## 2018-12-18 PROCEDURE — 3008F BODY MASS INDEX DOCD: CPT | Mod: CPTII,S$GLB,, | Performed by: NURSE PRACTITIONER

## 2018-12-18 PROCEDURE — 99213 OFFICE O/P EST LOW 20 MIN: CPT | Mod: S$GLB,,, | Performed by: NURSE PRACTITIONER

## 2018-12-18 PROCEDURE — 99999 PR PBB SHADOW E&M-EST. PATIENT-LVL V: CPT | Mod: PBBFAC,,, | Performed by: NURSE PRACTITIONER

## 2018-12-18 PROCEDURE — 71046 X-RAY EXAM CHEST 2 VIEWS: CPT | Mod: TC

## 2018-12-18 PROCEDURE — 71046 X-RAY EXAM CHEST 2 VIEWS: CPT | Mod: 26,,, | Performed by: RADIOLOGY

## 2018-12-18 RX ORDER — IBUPROFEN 600 MG/1
600 TABLET ORAL EVERY 8 HOURS PRN
Qty: 30 TABLET | Refills: 0 | Status: SHIPPED | OUTPATIENT
Start: 2018-12-18 | End: 2020-05-08

## 2018-12-18 RX ORDER — OMEPRAZOLE 20 MG/1
20 CAPSULE, DELAYED RELEASE ORAL DAILY PRN
COMMUNITY

## 2018-12-18 NOTE — TELEPHONE ENCOUNTER
----- Message from Angelique Ordaz sent at 12/18/2018 11:53 AM CST -----  Contact: Self  Still experiencing sharp pains, left part of upper back when breathing deeply in and out. General back pain. Pt has not heard anything back from message sent on yesterday.Please contact pt at [phone]

## 2018-12-18 NOTE — TELEPHONE ENCOUNTER
----- Message from Angelique Ordaz sent at 12/18/2018 11:53 AM CST -----  Contact: Self  Still experiencing sharp pains, left part of upper back when breathing deeply in and out. General back pain. Pt has not heard anything back from message sent on yesterday.Please contact pt at [phone]

## 2018-12-19 NOTE — PROGRESS NOTES
Subjective:       Patient ID: Yanna Kwong is a 38 y.o. female.    Chief Complaint: Back Pain (left side )    Ms. Kwong presents today for left sided thoracic back pain that began after she twisted while driving. The pain is worst when she breathes deeply or coughs.       Back Pain   This is a new problem. The current episode started in the past 7 days. The problem occurs constantly. The problem has been gradually improving since onset. The pain is present in the thoracic spine. The quality of the pain is described as aching and cramping. The pain does not radiate. The pain is at a severity of 4/10. Pertinent negatives include no abdominal pain, bladder incontinence, bowel incontinence, chest pain, dysuria, fever, headaches, leg pain, numbness, paresis, paresthesias, pelvic pain, perianal numbness, tingling, weakness or weight loss. She has tried nothing for the symptoms.     Review of Systems   Constitutional: Negative for fever and weight loss.   HENT: Negative for facial swelling.    Eyes: Negative for visual disturbance.   Respiratory: Negative for shortness of breath.    Cardiovascular: Negative for chest pain.   Gastrointestinal: Negative for abdominal pain and bowel incontinence.   Genitourinary: Negative for bladder incontinence, dysuria and pelvic pain.   Musculoskeletal: Positive for back pain.   Skin: Negative for rash.   Neurological: Negative for tingling, weakness, numbness, headaches and paresthesias.   Psychiatric/Behavioral: Negative for confusion.       Objective:      Physical Exam   Constitutional: She is oriented to person, place, and time. She appears well-developed and well-nourished. No distress.   HENT:   Head: Normocephalic and atraumatic.   Neck: Normal range of motion. Neck supple.   Cardiovascular: Normal rate, regular rhythm and normal heart sounds.   Pulmonary/Chest: Effort normal and breath sounds normal. No stridor. No respiratory distress. She has no wheezes. She has no  rales.   Abdominal: There is no CVA tenderness.   Musculoskeletal: She exhibits no edema.        Right shoulder: She exhibits pain.        Arms:  Neurological: She is alert and oriented to person, place, and time.   Skin: Skin is warm and dry. She is not diaphoretic.   Psychiatric: She has a normal mood and affect. Her behavior is normal.   Nursing note and vitals reviewed.      Assessment:       1. Costal chondritis        Plan:   1. Costal chondritis  - X-Ray Chest PA And Lateral; Future  - ibuprofen (ADVIL,MOTRIN) 600 MG tablet; Take 1 tablet (600 mg total) by mouth every 8 (eight) hours as needed for Pain.  Dispense: 30 tablet; Refill: 0      Pt has been given instructions populated from [employer] database and has verbalized understanding of the after visit summary and information contained wherein.    Follow up with a primary care provider. May go to ER for acute shortness of breath, lightheadedness, fever, or any other emergent complaints or changes in condition.

## 2019-01-11 ENCOUNTER — PATIENT MESSAGE (OUTPATIENT)
Dept: INTERNAL MEDICINE | Facility: CLINIC | Age: 39
End: 2019-01-11

## 2019-01-17 RX ORDER — AMOXICILLIN 875 MG/1
875 TABLET, FILM COATED ORAL EVERY 12 HOURS
Qty: 20 TABLET | Refills: 0 | Status: SHIPPED | OUTPATIENT
Start: 2019-01-17 | End: 2019-01-27

## 2019-01-18 ENCOUNTER — PATIENT MESSAGE (OUTPATIENT)
Dept: DERMATOLOGY | Facility: CLINIC | Age: 39
End: 2019-01-18

## 2019-01-18 ENCOUNTER — OFFICE VISIT (OUTPATIENT)
Dept: DERMATOLOGY | Facility: CLINIC | Age: 39
End: 2019-01-18
Payer: COMMERCIAL

## 2019-01-18 DIAGNOSIS — L21.9 SEBORRHEIC DERMATITIS OF SCALP: Primary | ICD-10-CM

## 2019-01-18 DIAGNOSIS — L65.9 HAIR LOSS DISORDER: ICD-10-CM

## 2019-01-18 PROCEDURE — 99202 OFFICE O/P NEW SF 15 MIN: CPT | Mod: S$GLB,,, | Performed by: PHYSICIAN ASSISTANT

## 2019-01-18 PROCEDURE — 99999 PR PBB SHADOW E&M-EST. PATIENT-LVL II: ICD-10-PCS | Mod: PBBFAC,,, | Performed by: PHYSICIAN ASSISTANT

## 2019-01-18 PROCEDURE — 99202 PR OFFICE/OUTPT VISIT, NEW, LEVL II, 15-29 MIN: ICD-10-PCS | Mod: S$GLB,,, | Performed by: PHYSICIAN ASSISTANT

## 2019-01-18 PROCEDURE — 99999 PR PBB SHADOW E&M-EST. PATIENT-LVL II: CPT | Mod: PBBFAC,,, | Performed by: PHYSICIAN ASSISTANT

## 2019-01-18 RX ORDER — KETOCONAZOLE 20 MG/ML
SHAMPOO, SUSPENSION TOPICAL
Qty: 120 ML | Refills: 5 | Status: SHIPPED | OUTPATIENT
Start: 2019-01-18

## 2019-01-18 NOTE — PATIENT INSTRUCTIONS
Discontinue Biotin.  Start viviscal.  Encourage natural (chemical and heat-free) hair styles and limited styling products.  Recommend thicker hair ties instead of thin ponytail holders.

## 2019-01-18 NOTE — PROGRESS NOTES
Subjective:       Patient ID:  Yanna Kwong is a 38 y.o. female who presents for   Chief Complaint   Patient presents with    Hair Loss     scalp, x 1+yrs, shedding, no tx      Hair Loss  - Initial  Affected locations: scalp  Duration: 3 years  Signs / symptoms: scaling, dryness and itching  Timing: intermittent  Aggravated by: nothing  Treatments tried: biotin daily.  Improvement on treatment: no relief    Washes hair once q 2 wks. Pt states her hair has been chemical free x 15 yrs. Has not straightened hair x 1 yr.  Hx of relaxers twice yearly prior to 2004.   Pt reports no hospitalizations prior to noticing hair loss and denies new life stressors.  TSH wnl in Oct. 2018.    Review of Systems   Constitutional: Negative for fever and chills.   Genitourinary: Negative for irregular periods (not on ocp).   Skin: Positive for daily sunscreen use and activity-related sunscreen use. Negative for recent sunburn.   Psychiatric/Behavioral: Negative for high stress.   Endocrine: Negative for cold intolerance, heat intolerance and polydipsia.   Hematologic/Lymphatic: Does not bruise/bleed easily.        Objective:    Physical Exam   Constitutional: She appears well-developed and well-nourished. No distress.   Neurological: She is alert and oriented to person, place, and time. She is not disoriented.   Psychiatric: She has a normal mood and affect.   Skin:   Areas Examined (abnormalities noted in diagram):   Scalp / Hair Palpated and Inspected  Head / Face Inspection Performed  Neck Inspection Performed  RUE Inspected  LUE Inspection Performed  Nails and Digits Inspection Performed                  Diagram Legend     Erythematous scaling macule/papule c/w actinic keratosis       Vascular papule c/w angioma      Pigmented verrucoid papule/plaque c/w seborrheic keratosis      Yellow umbilicated papule c/w sebaceous hyperplasia      Irregularly shaped tan macule c/w lentigo     1-2 mm smooth white papules consistent  with Milia      Movable subcutaneous cyst with punctum c/w epidermal inclusion cyst      Subcutaneous movable cyst c/w pilar cyst      Firm pink to brown papule c/w dermatofibroma      Pedunculated fleshy papule(s) c/w skin tag(s)      Evenly pigmented macule c/w junctional nevus     Mildly variegated pigmented, slightly irregular-bordered macule c/w mildly atypical nevus      Flesh colored to evenly pigmented papule c/w intradermal nevus       Pink pearly papule/plaque c/w basal cell carcinoma      Erythematous hyperkeratotic cursted plaque c/w SCC      Surgical scar with no sign of skin cancer recurrence      Open and closed comedones      Inflammatory papules and pustules      Verrucoid papule consistent consistent with wart     Erythematous eczematous patches and plaques     Dystrophic onycholytic nail with subungual debris c/w onychomycosis     Umbilicated papule    Erythematous-base heme-crusted tan verrucoid plaque consistent with inflamed seborrheic keratosis     Erythematous Silvery Scaling Plaque c/w Psoriasis     See annotation      Assessment / Plan:        Seborrheic dermatitis of scalp  -     ketoconazole (NIZORAL) 2 % shampoo; Wash hair with medicated shampoo at least 2x/week - let sit on scalp at least 5 minutes prior to rinsing  Dispense: 120 mL; Refill: 5    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  Encourage natural (chemical and heat-free) hair styles and limited styling products.  Recommend thicker hair ties instead of thin.            Follow-up in about 3 months (around 4/18/2019).

## 2019-02-13 RX ORDER — KETOROLAC TROMETHAMINE 10 MG/1
TABLET, FILM COATED ORAL
Qty: 18 TABLET | Refills: 0 | Status: SHIPPED | OUTPATIENT
Start: 2019-02-13 | End: 2020-05-08

## 2019-02-23 ENCOUNTER — PATIENT MESSAGE (OUTPATIENT)
Dept: INTERNAL MEDICINE | Facility: CLINIC | Age: 39
End: 2019-02-23

## 2019-02-23 ENCOUNTER — PATIENT MESSAGE (OUTPATIENT)
Dept: DERMATOLOGY | Facility: CLINIC | Age: 39
End: 2019-02-23

## 2019-02-25 ENCOUNTER — PATIENT MESSAGE (OUTPATIENT)
Dept: OBSTETRICS AND GYNECOLOGY | Facility: CLINIC | Age: 39
End: 2019-02-25

## 2019-02-25 NOTE — TELEPHONE ENCOUNTER
Patient is concerned about her hair shedding.  She states she has reached out to both her pcp and dermatologist.  She would like to know if a nutrient deficiency could be causing it or her medication.

## 2019-02-25 NOTE — TELEPHONE ENCOUNTER
Thank you for your message. The pt sent me a message and photo as well. I will contact her to discuss further treatment options.

## 2019-02-27 NOTE — TELEPHONE ENCOUNTER
Notify I'm not sure that the Ketoralac is the cause but she definitely is going the correct route with dermatology. There is a Dermatologist Dr. Shanna Oneill at [facility] who specializes in hair loss her number is [phone]

## 2019-04-09 ENCOUNTER — PATIENT MESSAGE (OUTPATIENT)
Dept: OBSTETRICS AND GYNECOLOGY | Facility: CLINIC | Age: 39
End: 2019-04-09

## 2019-04-09 DIAGNOSIS — R30.0 DYSURIA: Primary | ICD-10-CM

## 2019-04-10 ENCOUNTER — LAB VISIT (OUTPATIENT)
Dept: LAB | Facility: HOSPITAL | Age: 39
End: 2019-04-10
Attending: OBSTETRICS & GYNECOLOGY
Payer: COMMERCIAL

## 2019-04-10 DIAGNOSIS — R30.0 DYSURIA: ICD-10-CM

## 2019-04-10 PROCEDURE — 87086 URINE CULTURE/COLONY COUNT: CPT

## 2019-04-11 LAB
BACTERIA UR CULT: NORMAL
BACTERIA UR CULT: NORMAL

## 2019-04-12 ENCOUNTER — PATIENT MESSAGE (OUTPATIENT)
Dept: OBSTETRICS AND GYNECOLOGY | Facility: CLINIC | Age: 39
End: 2019-04-12

## 2019-04-17 NOTE — TELEPHONE ENCOUNTER
Patient notified it was not the towelette that cause the contaminated urine.  I explained the full process on how to collect a clean catch urine specimen and patient agreed that the process was not done correctly.  Patient states she is no longer having symptoms at this time so will pass on coming in for in and out cath

## 2019-04-18 ENCOUNTER — OFFICE VISIT (OUTPATIENT)
Dept: DERMATOLOGY | Facility: CLINIC | Age: 39
End: 2019-04-18
Payer: COMMERCIAL

## 2019-04-18 ENCOUNTER — PATIENT MESSAGE (OUTPATIENT)
Dept: DERMATOLOGY | Facility: CLINIC | Age: 39
End: 2019-04-18

## 2019-04-18 VITALS — BODY MASS INDEX: 33.83 KG/M2 | WEIGHT: 191 LBS

## 2019-04-18 DIAGNOSIS — L21.9 SEBORRHEIC DERMATITIS OF SCALP: ICD-10-CM

## 2019-04-18 DIAGNOSIS — L65.9 HAIR LOSS DISORDER: Primary | ICD-10-CM

## 2019-04-18 PROCEDURE — 99213 PR OFFICE/OUTPT VISIT, EST, LEVL III, 20-29 MIN: ICD-10-PCS | Mod: S$GLB,,, | Performed by: PHYSICIAN ASSISTANT

## 2019-04-18 PROCEDURE — 3008F BODY MASS INDEX DOCD: CPT | Mod: CPTII,S$GLB,, | Performed by: PHYSICIAN ASSISTANT

## 2019-04-18 PROCEDURE — 99999 PR PBB SHADOW E&M-EST. PATIENT-LVL III: CPT | Mod: PBBFAC,,, | Performed by: PHYSICIAN ASSISTANT

## 2019-04-18 PROCEDURE — 99213 OFFICE O/P EST LOW 20 MIN: CPT | Mod: S$GLB,,, | Performed by: PHYSICIAN ASSISTANT

## 2019-04-18 PROCEDURE — 99999 PR PBB SHADOW E&M-EST. PATIENT-LVL III: ICD-10-PCS | Mod: PBBFAC,,, | Performed by: PHYSICIAN ASSISTANT

## 2019-04-18 PROCEDURE — 3008F PR BODY MASS INDEX (BMI) DOCUMENTED: ICD-10-PCS | Mod: CPTII,S$GLB,, | Performed by: PHYSICIAN ASSISTANT

## 2019-04-18 NOTE — PATIENT INSTRUCTIONS
Apply Rogaine (minoxidil) 5% daily to scalp.    Continue viviscal and ketoconazole shampoo.    Consider spironolactone.

## 2019-04-18 NOTE — PROGRESS NOTES
"  Subjective:       Patient ID:  Yanna Kwong is a 39 y.o. female who presents for   Chief Complaint   Patient presents with    Hair Loss     3 month f/u      Hair Loss  - Follow-up  Symptom course: unchanged (last seen 1/18/19)  Currently using: keto shampoo once weekly, viviscal bid since March 27th.  Affected locations: scalp  SIGNS AND SYMPTOMS F/U: itching, dryness, and flaking have improved.      Review of Systems   Constitutional: Negative for fever, chills, weight loss and weight gain.   HENT: Negative for mouth sores.    Genitourinary: Negative for irregular periods (not on ocp).   Musculoskeletal: Negative for arthralgias.   Skin: Positive for daily sunscreen use and activity-related sunscreen use. Negative for recent sunburn.   Psychiatric/Behavioral: Positive for high stress ("normal life stuff").   Endocrine: Negative for cold intolerance, heat intolerance and polydipsia.   Hematologic/Lymphatic: Does not bruise/bleed easily.        bjective:    Physical Exam   Constitutional: She appears well-developed and well-nourished. She is obese.  No distress.   Neurological: She is alert and oriented to person, place, and time. She is not disoriented.   Psychiatric: She has a normal mood and affect.   Skin:   Areas Examined (abnormalities noted in diagram):   Scalp / Hair Palpated and Inspected              Diagram Legend     Erythematous scaling macule/papule c/w actinic keratosis       Vascular papule c/w angioma      Pigmented verrucoid papule/plaque c/w seborrheic keratosis      Yellow umbilicated papule c/w sebaceous hyperplasia      Irregularly shaped tan macule c/w lentigo     1-2 mm smooth white papules consistent with Milia      Movable subcutaneous cyst with punctum c/w epidermal inclusion cyst      Subcutaneous movable cyst c/w pilar cyst      Firm pink to brown papule c/w dermatofibroma      Pedunculated fleshy papule(s) c/w skin tag(s)      Evenly pigmented macule c/w junctional nevus    "  Mildly variegated pigmented, slightly irregular-bordered macule c/w mildly atypical nevus      Flesh colored to evenly pigmented papule c/w intradermal nevus       Pink pearly papule/plaque c/w basal cell carcinoma      Erythematous hyperkeratotic cursted plaque c/w SCC      Surgical scar with no sign of skin cancer recurrence      Open and closed comedones      Inflammatory papules and pustules      Verrucoid papule consistent consistent with wart     Erythematous eczematous patches and plaques     Dystrophic onycholytic nail with subungual debris c/w onychomycosis     Umbilicated papule    Erythematous-base heme-crusted tan verrucoid plaque consistent with inflamed seborrheic keratosis     Erythematous Silvery Scaling Plaque c/w Psoriasis     See annotation    Assessment / Plan:      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  Continue viviscal and ketoconazole shampoo.    Discussed spironolactone - Patient wishes to defer treatment at this time. Consider adding at f/u.  Pt given Dr. Shnana Parekh's information for possible consult.    Seborrheic dermatitis of scalp  Continue keto shampoo at least once weekly.         Follow up in about 3 months (around 7/18/2019).

## 2019-06-12 ENCOUNTER — PATIENT MESSAGE (OUTPATIENT)
Dept: DERMATOLOGY | Facility: CLINIC | Age: 39
End: 2019-06-12

## 2019-07-19 ENCOUNTER — OFFICE VISIT (OUTPATIENT)
Dept: DERMATOLOGY | Facility: CLINIC | Age: 39
End: 2019-07-19
Payer: COMMERCIAL

## 2019-07-19 DIAGNOSIS — L81.8 POST-INFLAMMATORY HYPOPIGMENTATION: ICD-10-CM

## 2019-07-19 DIAGNOSIS — L21.9 SEBORRHEIC DERMATITIS: Primary | ICD-10-CM

## 2019-07-19 DIAGNOSIS — L65.9 HAIR LOSS DISORDER: ICD-10-CM

## 2019-07-19 PROCEDURE — 99999 PR PBB SHADOW E&M-EST. PATIENT-LVL II: ICD-10-PCS | Mod: PBBFAC,,, | Performed by: PHYSICIAN ASSISTANT

## 2019-07-19 PROCEDURE — 99999 PR PBB SHADOW E&M-EST. PATIENT-LVL II: CPT | Mod: PBBFAC,,, | Performed by: PHYSICIAN ASSISTANT

## 2019-07-19 PROCEDURE — 99213 OFFICE O/P EST LOW 20 MIN: CPT | Mod: S$GLB,,, | Performed by: PHYSICIAN ASSISTANT

## 2019-07-19 PROCEDURE — 99213 PR OFFICE/OUTPT VISIT, EST, LEVL III, 20-29 MIN: ICD-10-PCS | Mod: S$GLB,,, | Performed by: PHYSICIAN ASSISTANT

## 2019-07-19 RX ORDER — FLUOCINONIDE TOPICAL SOLUTION USP, 0.05% 0.5 MG/ML
SOLUTION TOPICAL
Qty: 60 ML | Refills: 3 | Status: SHIPPED | OUTPATIENT
Start: 2019-07-19

## 2019-07-19 RX ORDER — KETOCONAZOLE 20 MG/G
CREAM TOPICAL
Qty: 60 G | Refills: 2 | Status: SHIPPED | OUTPATIENT
Start: 2019-07-19

## 2019-07-19 NOTE — PROGRESS NOTES
"  Subjective:       Patient ID:  Yanna Kwong is a 39 y.o. female who presents for   Chief Complaint   Patient presents with    Hair Loss     Follow up     Hair Loss  - Follow-up  Symptom course: improving (last seen 4/18/19)  Currently using: keto shampoo once weekly, viviscal bid since March 27th.  Affected locations: scalp  SIGNS AND SYMPTOMS F/U: itching, dryness, and flaking have improved.    Pt is c/o a light spot on the left posterior knee. She had "cryotherapy" 1.5 months ago and the area was initially red, then peeled, and is now lighter than surrounding skin. No associated itching.    Review of Systems   Constitutional: Negative for fever, chills, weight loss and weight gain.   HENT: Negative for mouth sores.    Genitourinary: Negative for irregular periods (not on ocp).   Musculoskeletal: Negative for arthralgias.   Skin: Positive for daily sunscreen use and activity-related sunscreen use. Negative for recent sunburn.   Psychiatric/Behavioral: Positive for high stress ("normal life stuff").   Endocrine: Negative for cold intolerance, heat intolerance and polydipsia.   Hematologic/Lymphatic: Does not bruise/bleed easily.        Objective:    Physical Exam   Constitutional: She appears well-developed and well-nourished. She is obese.  No distress.   Neurological: She is alert and oriented to person, place, and time. She is not disoriented.   Psychiatric: She has a normal mood and affect.   Skin:   Areas Examined (abnormalities noted in diagram):   Scalp / Hair Palpated and Inspected  RLE Inspected  LLE Inspection Performed                   Diagram Legend     Erythematous scaling macule/papule c/w actinic keratosis       Vascular papule c/w angioma      Pigmented verrucoid papule/plaque c/w seborrheic keratosis      Yellow umbilicated papule c/w sebaceous hyperplasia      Irregularly shaped tan macule c/w lentigo     1-2 mm smooth white papules consistent with Milia      Movable subcutaneous " cyst with punctum c/w epidermal inclusion cyst      Subcutaneous movable cyst c/w pilar cyst      Firm pink to brown papule c/w dermatofibroma      Pedunculated fleshy papule(s) c/w skin tag(s)      Evenly pigmented macule c/w junctional nevus     Mildly variegated pigmented, slightly irregular-bordered macule c/w mildly atypical nevus      Flesh colored to evenly pigmented papule c/w intradermal nevus       Pink pearly papule/plaque c/w basal cell carcinoma      Erythematous hyperkeratotic cursted plaque c/w SCC      Surgical scar with no sign of skin cancer recurrence      Open and closed comedones      Inflammatory papules and pustules      Verrucoid papule consistent consistent with wart     Erythematous eczematous patches and plaques     Dystrophic onycholytic nail with subungual debris c/w onychomycosis     Umbilicated papule    Erythematous-base heme-crusted tan verrucoid plaque consistent with inflamed seborrheic keratosis     Erythematous Silvery Scaling Plaque c/w Psoriasis     See annotation    Assessment / Plan:      Seborrheic dermatitis  -     ketoconazole (NIZORAL) 2 % cream; AAA face bid prn for flares.  Dispense: 60 g; Refill: 2  -     fluocinonide (LIDEX) 0.05 % external solution; AAA scalp qday - bid prn pruritus  Dispense: 60 mL; Refill: 3    Hair loss disorder  Continue viviscal.  Recommend starting rogaine 5% qhs as discussed at previous appt.    Post-inflammatory hypopigmentation  Counseled pt - area should normalize with time.       Follow up if symptoms worsen or fail to improve.

## 2020-01-27 ENCOUNTER — OFFICE VISIT (OUTPATIENT)
Dept: INTERNAL MEDICINE | Facility: CLINIC | Age: 40
End: 2020-01-27
Payer: COMMERCIAL

## 2020-01-27 VITALS
DIASTOLIC BLOOD PRESSURE: 80 MMHG | HEART RATE: 69 BPM | SYSTOLIC BLOOD PRESSURE: 126 MMHG | OXYGEN SATURATION: 99 % | BODY MASS INDEX: 35.11 KG/M2 | WEIGHT: 198.19 LBS

## 2020-01-27 DIAGNOSIS — K21.9 GASTROESOPHAGEAL REFLUX DISEASE, ESOPHAGITIS PRESENCE NOT SPECIFIED: ICD-10-CM

## 2020-01-27 DIAGNOSIS — Z82.49 FAMILY HISTORY OF HEART DISEASE: ICD-10-CM

## 2020-01-27 DIAGNOSIS — E04.1 THYROID NODULE: ICD-10-CM

## 2020-01-27 DIAGNOSIS — R05.9 COUGH: Primary | ICD-10-CM

## 2020-01-27 DIAGNOSIS — R06.02 SOB (SHORTNESS OF BREATH) ON EXERTION: ICD-10-CM

## 2020-01-27 DIAGNOSIS — R09.89 THROAT CLEARING: ICD-10-CM

## 2020-01-27 PROCEDURE — 99999 PR PBB SHADOW E&M-EST. PATIENT-LVL IV: ICD-10-PCS | Mod: PBBFAC,,, | Performed by: PHYSICIAN ASSISTANT

## 2020-01-27 PROCEDURE — 99214 OFFICE O/P EST MOD 30 MIN: CPT | Mod: S$GLB,,, | Performed by: PHYSICIAN ASSISTANT

## 2020-01-27 PROCEDURE — 99214 PR OFFICE/OUTPT VISIT, EST, LEVL IV, 30-39 MIN: ICD-10-PCS | Mod: S$GLB,,, | Performed by: PHYSICIAN ASSISTANT

## 2020-01-27 PROCEDURE — 3008F BODY MASS INDEX DOCD: CPT | Mod: CPTII,S$GLB,, | Performed by: PHYSICIAN ASSISTANT

## 2020-01-27 PROCEDURE — 3008F PR BODY MASS INDEX (BMI) DOCUMENTED: ICD-10-PCS | Mod: CPTII,S$GLB,, | Performed by: PHYSICIAN ASSISTANT

## 2020-01-27 PROCEDURE — 99999 PR PBB SHADOW E&M-EST. PATIENT-LVL IV: CPT | Mod: PBBFAC,,, | Performed by: PHYSICIAN ASSISTANT

## 2020-01-27 NOTE — PROGRESS NOTES
"  Subjective:       Patient ID: Yanna Kwong is a 39 y.o. female.    Chief Complaint: Cough (intermittent since tushar) and Shortness of Breath (heavy feeling at times when breathing)    HPI     Established pt of Lexie Chisholm MD (new to me)    C/o intermittent cough, frequent throat clearing over the past 4 weeks. Occ productive of clear sputum. She notes she occ has episodes where its a "grabbing sensation" that takes her breath away. Notes mild SALES when climbing stairs, attributes it to being out of shape. +FH of CAD (Mom had CABG in her late 40s/early 50s)    She endorses GERD/heartburn for several years. Takes Tums and OTC omeprazole intermittently. Notes unhealthy diet, had pizza last night and had to take a PPI for relief.     No abdominal pain, n/v brbrp or melena.     No cp, diaphoresis, edema or orthopnea     No sob at rest.         Answers for HPI/ROS submitted by the patient on 1/27/2020   Cough  Chronicity: new  Onset: 1 to 4 weeks ago  Progression since onset: unchanged  Frequency: every few hours  Cough characteristics: non-productive  chest pain: No  chills: No  ear congestion: No  ear pain: No  fever: No  headaches: No  heartburn: Yes  hemoptysis: No  myalgias: No  nasal congestion: No  postnasal drip: No  rash: No  rhinorrhea: No  shortness of breath: No  sore throat: No  sweats: No  weight loss: No  wheezing: No  Aggravated by: nothing, lying down, other  asthma: No  bronchiectasis: No  bronchitis: No  COPD: No  emphysema: No  environmental allergies: No  pneumonia: No  Treatments tried: body position changes, rest  Improvement on treatment: moderate    Past Medical History:   Diagnosis Date    Thyroid nodule      Social History     Tobacco Use    Smoking status: Never Smoker    Smokeless tobacco: Never Used   Substance Use Topics    Alcohol use: No     Frequency: Never     Binge frequency: Never    Drug use: Never     Review of patient's allergies indicates:   Allergen " Reactions    Vicodin [hydrocodone-acetaminophen] Other (See Comments)     tongue    Erythromycin Itching and Rash    Ilosone Rash         Review of Systems   Constitutional: Negative for chills, fever and unexpected weight change.   HENT: Negative for sore throat, trouble swallowing and voice change.    Respiratory: Positive for cough and shortness of breath. Negative for wheezing.    Cardiovascular: Negative for chest pain and leg swelling.   Gastrointestinal: Negative for abdominal pain, nausea and vomiting.        GERD   Skin: Negative for rash.   Neurological: Negative for weakness, light-headedness and headaches.       Objective: /80   Pulse 69   Wt 89.9 kg (198 lb 3.1 oz)   SpO2 99%   BMI 35.11 kg/m²         Physical Exam   Constitutional: She appears well-developed and well-nourished. No distress.   HENT:   Head: Normocephalic and atraumatic.   Right Ear: External ear normal.   Left Ear: External ear normal.   Mouth/Throat: Oropharynx is clear and moist. No oropharyngeal exudate.   Cardiovascular: Normal rate and regular rhythm. Exam reveals no friction rub.   No murmur heard.  Pulmonary/Chest: Effort normal and breath sounds normal. She has no wheezes. She has no rales.   Musculoskeletal: She exhibits no edema.   Lymphadenopathy:     She has no cervical adenopathy.   Neurological: She is alert.   Skin: Skin is warm and dry. No rash noted.   Vitals reviewed.      Assessment:       1. Cough    2. Throat clearing    3. SOB (shortness of breath) on exertion    4. Gastroesophageal reflux disease, esophagitis presence not specified    5. Family history of heart disease    6. Thyroid nodule        Plan:         Yanna was seen today for cough and shortness of breath.    Diagnoses and all orders for this visit:    Cough  Throat clearing  Suspect symptoms related to GERD  Will start 2 week trial of omeprazole 20mg daily  Discussed avoiding GERD exacerbating foods    SOB (shortness of breath) on  exertion  -     CBC auto differential; Future  -     Comprehensive metabolic panel; Future  -     Stress Echo Which stress agent will be used? Treadmill Exercise; Color Flow Doppler? No; Future    Gastroesophageal reflux disease, esophagitis presence not specified  2 week Trial of PPI as above    Family history of heart disease  -     Lipid panel; Future  -     Stress Echo Which stress agent will be used? Treadmill Exercise; Color Flow Doppler? No; Future    Thyroid nodule  Repeat US due July 2020/2021  -     TSH; Future      Opal Gibson PA-C

## 2020-01-27 NOTE — PATIENT INSTRUCTIONS
TRIAL OF 2 WEEKS COURSE OF OMEPRAZOLE, EVERY MORNING ON EMPTY STOMACH.     WORK ON CUTTING BACK ON GERD EXACERBATING FOODS.     WE WILL FOLLOW UP ON LAB AND STRESS ECHO.

## 2020-01-28 ENCOUNTER — LAB VISIT (OUTPATIENT)
Dept: LAB | Facility: HOSPITAL | Age: 40
End: 2020-01-28
Attending: PHYSICIAN ASSISTANT
Payer: COMMERCIAL

## 2020-01-28 ENCOUNTER — HOSPITAL ENCOUNTER (OUTPATIENT)
Dept: CARDIOLOGY | Facility: CLINIC | Age: 40
Discharge: HOME | End: 2020-01-28
Attending: PHYSICIAN ASSISTANT
Payer: COMMERCIAL

## 2020-01-28 ENCOUNTER — PATIENT MESSAGE (OUTPATIENT)
Dept: INTERNAL MEDICINE | Facility: CLINIC | Age: 40
End: 2020-01-28

## 2020-01-28 VITALS — WEIGHT: 197 LBS | BODY MASS INDEX: 34.91 KG/M2 | HEIGHT: 63 IN

## 2020-01-28 DIAGNOSIS — R06.02 SOB (SHORTNESS OF BREATH) ON EXERTION: ICD-10-CM

## 2020-01-28 DIAGNOSIS — Z82.49 FAMILY HISTORY OF HEART DISEASE: ICD-10-CM

## 2020-01-28 DIAGNOSIS — E04.1 THYROID NODULE: ICD-10-CM

## 2020-01-28 LAB
ALBUMIN SERPL BCP-MCNC: 3.8 G/DL (ref 3.5–5.2)
ALP SERPL-CCNC: 47 U/L (ref 55–135)
ALT SERPL W/O P-5'-P-CCNC: 19 U/L (ref 10–44)
ANION GAP SERPL CALC-SCNC: 7 MMOL/L (ref 8–16)
ASCENDING AORTA: 2.4 CM
AST SERPL-CCNC: 18 U/L (ref 10–40)
BASOPHILS # BLD AUTO: 0.03 K/UL (ref 0–0.2)
BASOPHILS NFR BLD: 0.5 % (ref 0–1.9)
BILIRUB SERPL-MCNC: 0.8 MG/DL (ref 0.1–1)
BSA FOR ECHO PROCEDURE: 1.99 M2
BUN SERPL-MCNC: 9 MG/DL (ref 6–20)
CALCIUM SERPL-MCNC: 9.5 MG/DL (ref 8.7–10.5)
CHLORIDE SERPL-SCNC: 105 MMOL/L (ref 95–110)
CHOLEST SERPL-MCNC: 203 MG/DL (ref 120–199)
CHOLEST/HDLC SERPL: 3.8 {RATIO} (ref 2–5)
CO2 SERPL-SCNC: 25 MMOL/L (ref 23–29)
CREAT SERPL-MCNC: 0.8 MG/DL (ref 0.5–1.4)
CV ECHO LV RWT: 0.31 CM
CV STRESS BASE HR: 71 BPM
DIASTOLIC BLOOD PRESSURE: 52 MMHG
DIFFERENTIAL METHOD: ABNORMAL
DOP CALC LVOT AREA: 3.1 CM2
DOP CALC LVOT DIAMETER: 1.98 CM
DOP CALC LVOT PEAK VEL: 0.99 M/S
DOP CALC LVOT STROKE VOLUME: 59.55 CM3
DOP CALCLVOT PEAK VEL VTI: 19.35 CM
E WAVE DECELERATION TIME: 150.25 MSEC
E/A RATIO: 1.24
E/E' RATIO: 6.35 M/S
ECHO LV POSTERIOR WALL: 0.68 CM (ref 0.6–1.1)
EOSINOPHIL # BLD AUTO: 0.1 K/UL (ref 0–0.5)
EOSINOPHIL NFR BLD: 0.8 % (ref 0–8)
ERYTHROCYTE [DISTWIDTH] IN BLOOD BY AUTOMATED COUNT: 12.9 % (ref 11.5–14.5)
EST. GFR  (AFRICAN AMERICAN): >60 ML/MIN/1.73 M^2
EST. GFR  (NON AFRICAN AMERICAN): >60 ML/MIN/1.73 M^2
FRACTIONAL SHORTENING: 36 % (ref 28–44)
GLUCOSE SERPL-MCNC: 100 MG/DL (ref 70–110)
HCT VFR BLD AUTO: 38.4 % (ref 37–48.5)
HDLC SERPL-MCNC: 54 MG/DL (ref 40–75)
HDLC SERPL: 26.6 % (ref 20–50)
HGB BLD-MCNC: 13 G/DL (ref 12–16)
INTERVENTRICULAR SEPTUM: 0.71 CM (ref 0.6–1.1)
IVRT: 0.08 MSEC
LA MAJOR: 4.54 CM
LA MINOR: 4.7 CM
LA WIDTH: 3.06 CM
LDLC SERPL CALC-MCNC: 134.4 MG/DL (ref 63–159)
LEFT ATRIUM SIZE: 3.19 CM
LEFT ATRIUM VOLUME INDEX: 19.9 ML/M2
LEFT ATRIUM VOLUME: 38.32 CM3
LEFT INTERNAL DIMENSION IN SYSTOLE: 2.77 CM (ref 2.1–4)
LEFT VENTRICLE DIASTOLIC VOLUME INDEX: 44.47 ML/M2
LEFT VENTRICLE DIASTOLIC VOLUME: 85.45 ML
LEFT VENTRICLE MASS INDEX: 47 G/M2
LEFT VENTRICLE SYSTOLIC VOLUME INDEX: 15 ML/M2
LEFT VENTRICLE SYSTOLIC VOLUME: 28.74 ML
LEFT VENTRICULAR INTERNAL DIMENSION IN DIASTOLE: 4.35 CM (ref 3.5–6)
LEFT VENTRICULAR MASS: 89.46 G
LV LATERAL E/E' RATIO: 5.62 M/S
LV SEPTAL E/E' RATIO: 7.3 M/S
LYMPHOCYTES # BLD AUTO: 2 K/UL (ref 1–4.8)
LYMPHOCYTES NFR BLD: 32.5 % (ref 18–48)
MCH RBC QN AUTO: 29.5 PG (ref 27–31)
MCHC RBC AUTO-ENTMCNC: 33.9 G/DL (ref 32–36)
MCV RBC AUTO: 87 FL (ref 82–98)
MONOCYTES # BLD AUTO: 0.2 K/UL (ref 0.3–1)
MONOCYTES NFR BLD: 3.1 % (ref 4–15)
MV PEAK A VEL: 0.59 M/S
MV PEAK E VEL: 0.73 M/S
NEUTROPHILS # BLD AUTO: 3.9 K/UL (ref 1.8–7.7)
NEUTROPHILS NFR BLD: 63.1 % (ref 38–73)
NONHDLC SERPL-MCNC: 149 MG/DL
OHS CV CPX 1 MINUTE RECOVERY HEART RATE: 127 BPM
OHS CV CPX 85 PERCENT MAX PREDICTED HEART RATE MALE: 147
OHS CV CPX ESTIMATED METS: 15
OHS CV CPX MAX PREDICTED HEART RATE: 173
OHS CV CPX PATIENT IS FEMALE: 1
OHS CV CPX PATIENT IS MALE: 0
OHS CV CPX PEAK DIASTOLIC BLOOD PRESSURE: 53 MMHG
OHS CV CPX PEAK HEAR RATE: 176 BPM
OHS CV CPX PEAK RATE PRESSURE PRODUCT: NORMAL
OHS CV CPX PEAK SYSTOLIC BLOOD PRESSURE: 134 MMHG
OHS CV CPX PERCENT MAX PREDICTED HEART RATE ACHIEVED: 102
OHS CV CPX RATE PRESSURE PRODUCT PRESENTING: 8165
PISA TR MAX VEL: 2.21 M/S
PLATELET # BLD AUTO: 342 K/UL (ref 150–350)
PMV BLD AUTO: 9.2 FL (ref 9.2–12.9)
POTASSIUM SERPL-SCNC: 4.2 MMOL/L (ref 3.5–5.1)
PROT SERPL-MCNC: 7.7 G/DL (ref 6–8.4)
PULM VEIN S/D RATIO: 1.08
PV PEAK D VEL: 0.39 M/S
PV PEAK S VEL: 0.42 M/S
RA MAJOR: 4.42 CM
RA PRESSURE: 3 MMHG
RA WIDTH: 3.22 CM
RBC # BLD AUTO: 4.41 M/UL (ref 4–5.4)
RIGHT VENTRICULAR END-DIASTOLIC DIMENSION: 2.74 CM
RV TISSUE DOPPLER FREE WALL SYSTOLIC VELOCITY 1 (APICAL 4 CHAMBER VIEW): 14.59 CM/S
SINUS: 2.58 CM
SODIUM SERPL-SCNC: 137 MMOL/L (ref 136–145)
STJ: 2.09 CM
STRESS ECHO POST EXERCISE DUR MIN: 8 MINUTES
STRESS ECHO POST EXERCISE DUR SEC: 20 SECONDS
SYSTOLIC BLOOD PRESSURE: 115 MMHG
TDI LATERAL: 0.13 M/S
TDI SEPTAL: 0.1 M/S
TDI: 0.12 M/S
TR MAX PG: 20 MMHG
TRICUSPID ANNULAR PLANE SYSTOLIC EXCURSION: 2.11 CM
TRIGL SERPL-MCNC: 73 MG/DL (ref 30–150)
TSH SERPL DL<=0.005 MIU/L-ACNC: 1.22 UIU/ML (ref 0.4–4)
TV REST PULMONARY ARTERY PRESSURE: 23 MMHG
WBC # BLD AUTO: 6.21 K/UL (ref 3.9–12.7)

## 2020-01-28 PROCEDURE — 93351 STRESS ECHO (CUPID ONLY): ICD-10-PCS | Mod: 26,,, | Performed by: INTERNAL MEDICINE

## 2020-01-28 PROCEDURE — 80061 LIPID PANEL: CPT

## 2020-01-28 PROCEDURE — 85025 COMPLETE CBC W/AUTO DIFF WBC: CPT

## 2020-01-28 PROCEDURE — 80053 COMPREHEN METABOLIC PANEL: CPT

## 2020-01-28 PROCEDURE — 36415 COLL VENOUS BLD VENIPUNCTURE: CPT

## 2020-01-28 PROCEDURE — 93351 STRESS TTE COMPLETE: CPT

## 2020-01-28 PROCEDURE — 93351 STRESS TTE COMPLETE: CPT | Mod: 26,,, | Performed by: INTERNAL MEDICINE

## 2020-01-28 PROCEDURE — 84443 ASSAY THYROID STIM HORMONE: CPT

## 2020-01-29 ENCOUNTER — PATIENT MESSAGE (OUTPATIENT)
Dept: INTERNAL MEDICINE | Facility: CLINIC | Age: 40
End: 2020-01-29

## 2020-01-29 DIAGNOSIS — I35.8 AORTIC VALVE SCLEROSIS: Primary | ICD-10-CM

## 2020-02-03 ENCOUNTER — PATIENT MESSAGE (OUTPATIENT)
Dept: CARDIOLOGY | Facility: CLINIC | Age: 40
End: 2020-02-03

## 2020-02-03 ENCOUNTER — PATIENT MESSAGE (OUTPATIENT)
Dept: INTERNAL MEDICINE | Facility: CLINIC | Age: 40
End: 2020-02-03

## 2020-02-03 ENCOUNTER — PATIENT MESSAGE (OUTPATIENT)
Dept: OBSTETRICS AND GYNECOLOGY | Facility: CLINIC | Age: 40
End: 2020-02-03

## 2020-02-03 ENCOUNTER — OFFICE VISIT (OUTPATIENT)
Dept: CARDIOLOGY | Facility: CLINIC | Age: 40
End: 2020-02-03
Payer: COMMERCIAL

## 2020-02-03 VITALS
DIASTOLIC BLOOD PRESSURE: 55 MMHG | BODY MASS INDEX: 34.57 KG/M2 | WEIGHT: 195.13 LBS | HEART RATE: 72 BPM | HEIGHT: 63 IN | SYSTOLIC BLOOD PRESSURE: 120 MMHG

## 2020-02-03 DIAGNOSIS — I35.8 AORTIC VALVE MASS: Primary | ICD-10-CM

## 2020-02-03 DIAGNOSIS — R07.9 CHEST PAIN, UNSPECIFIED TYPE: ICD-10-CM

## 2020-02-03 DIAGNOSIS — K21.9 GASTROESOPHAGEAL REFLUX DISEASE, ESOPHAGITIS PRESENCE NOT SPECIFIED: ICD-10-CM

## 2020-02-03 PROCEDURE — 99214 OFFICE O/P EST MOD 30 MIN: CPT | Mod: S$GLB,,, | Performed by: INTERNAL MEDICINE

## 2020-02-03 PROCEDURE — 99214 PR OFFICE/OUTPT VISIT, EST, LEVL IV, 30-39 MIN: ICD-10-PCS | Mod: S$GLB,,, | Performed by: INTERNAL MEDICINE

## 2020-02-03 PROCEDURE — 99999 PR PBB SHADOW E&M-EST. PATIENT-LVL IV: ICD-10-PCS | Mod: PBBFAC,,, | Performed by: INTERNAL MEDICINE

## 2020-02-03 PROCEDURE — 99999 PR PBB SHADOW E&M-EST. PATIENT-LVL IV: CPT | Mod: PBBFAC,,, | Performed by: INTERNAL MEDICINE

## 2020-02-03 PROCEDURE — 3008F PR BODY MASS INDEX (BMI) DOCUMENTED: ICD-10-PCS | Mod: CPTII,S$GLB,, | Performed by: INTERNAL MEDICINE

## 2020-02-03 PROCEDURE — 3008F BODY MASS INDEX DOCD: CPT | Mod: CPTII,S$GLB,, | Performed by: INTERNAL MEDICINE

## 2020-02-04 NOTE — PROGRESS NOTES
Subjective:   Chief Complaint: Chest Pain (occasional chest discomfort/ wants to review results of recent stress)  Last Clinic Visit: New Patient    History of Present Illness: Yanna Kwong is a 39 y.o. lady who presents with atypical chest pain, and abnormal echocardiogram.  She recently saw her primary care doctor, and reports several complaints, first including a cough over the last month.  Cough has been productive of light sputum, not related any fevers, nothing makes it better.  She took omeprazole for a week, without any significant change, and describes a having feeling with a cough.  Her father recently had an illness, and she is wondering if she caught the tail end of it.  She also endorses chest pain, reports that she had left-sided chest pain, with some radiation to her back, pain is off and on, not related to exertion.  Somewhat positional, somewhat reproducible.  She recently started her period, and does have some breast tenderness, which is possibly associated with the pain.  She has not taken anything for it.  Her primary care physician ordered a stress echocardiogram, which was negative for ischemia on both EKG and echo, and she denies any pain during the stress test.  Notably however on the echocardiogram she did have a possible aortic calcification or mass.  She has a family history of coronary disease with her mother having CABG in her late 40s or early 50s.    Dx:  Atypical chest pain  Abnormal echocardiogram  Familial history of coronary artery disease  Past medical and surgical history reviewed as documented.    Review of Systems   Constitution: Positive for malaise/fatigue.   HENT: Negative.    Eyes: Negative.    Cardiovascular: Positive for chest pain.   Respiratory: Positive for cough.    Hematologic/Lymphatic: Negative.    Skin: Negative.    Musculoskeletal: Positive for back pain.   Gastrointestinal: Negative.    Genitourinary: Negative.      Medications:  Outpatient Encounter  Medications as of 2/3/2020   Medication Sig Dispense Refill    ascorbic acid (VITAMIN C) 1000 MG tablet Take 1,000 mg by mouth once daily.      b complex vitamins capsule Take 1 capsule by mouth once daily.      fish oil-omega-3 fatty acids 300-1,000 mg capsule Take 1 g by mouth once daily.      folic acid (FOLVITE) 400 MCG tablet Take 400 mcg by mouth once daily.      multivitamin (ONE DAILY MULTIVITAMIN) per tablet Take 1 tablet by mouth once daily.      omeprazole (PRILOSEC) 20 MG capsule Take 20 mg by mouth daily as needed.      vitamin E 400 UNIT capsule Take 400 Units by mouth once daily.      BIOTIN ORAL Take 1 tablet by mouth once daily.      fluocinonide (LIDEX) 0.05 % external solution AAA scalp qday - bid prn pruritus 60 mL 3    ibuprofen (ADVIL,MOTRIN) 600 MG tablet Take 1 tablet (600 mg total) by mouth every 8 (eight) hours as needed for Pain. (Patient not taking: Reported on 2/3/2020) 30 tablet 0    ketoconazole (NIZORAL) 2 % cream AAA face bid prn for flares. 60 g 2    ketoconazole (NIZORAL) 2 % shampoo Wash hair with medicated shampoo at least 2x/week - let sit on scalp at least 5 minutes prior to rinsing 120 mL 5    ketorolac (TORADOL) 10 mg tablet TAKE 1 TABLET BY MOUTH EVERY 8 HOURS. (USE ONLY 3 DAYS PER MONTH) 18 tablet 0     No facility-administered encounter medications on file as of 2/3/2020.      Family History:  Yanna's family history includes Arthritis in her maternal grandmother and paternal grandfather; Atrial fibrillation in her maternal aunt; COPD in her paternal uncle; Cancer in her maternal uncle, paternal grandfather, and paternal uncle; Cataracts in her father; Colon cancer (age of onset: 67) in her maternal uncle; Diabetes in her paternal grandmother; Early death in her paternal uncle; Hearing loss in her maternal grandmother and paternal grandfather; Heart attack in her cousin, maternal grandfather, and paternal grandmother; Heart disease in her maternal  "grandfather, mother, and paternal grandmother; Heart failure in her maternal grandfather; Hyperlipidemia in her father, mother, and paternal grandmother; Hypertension in her maternal aunt, maternal grandfather, maternal grandmother, paternal aunt, and paternal grandfather; Kidney disease in her maternal grandmother; Macular degeneration in her father; Miscarriages / Stillbirths in her mother; Prostate cancer in her paternal grandfather; Stroke in her maternal uncle and maternal uncle; Vision loss in her father.    Social History:  Yanna reports that she has never smoked. She has never used smokeless tobacco. She reports that she does not drink alcohol or use drugs.    Objective:   BP (!) 120/55   Pulse 72   Ht 5' 3" (1.6 m)   Wt 88.5 kg (195 lb 1.7 oz)   BMI 34.56 kg/m²     Physical Exam   Constitutional: She is oriented to person, place, and time and well-developed, well-nourished, and in no distress. No distress.   HENT:   Head: Normocephalic and atraumatic.   Mouth/Throat: No oropharyngeal exudate.   Eyes: EOM are normal. No scleral icterus.   Neck: No JVD present. No tracheal deviation present. No thyromegaly present.   Cardiovascular: Normal rate and regular rhythm. Exam reveals no gallop and no friction rub.   No murmur heard.  Pulmonary/Chest: Effort normal and breath sounds normal. No respiratory distress. She has no wheezes. She has no rales. She exhibits no tenderness.   Slightly reproducible chest pain   Abdominal: Soft. She exhibits no distension. There is no tenderness. There is no rebound and no guarding.   Musculoskeletal: Normal range of motion. She exhibits no edema.   Neurological: She is alert and oriented to person, place, and time.   Skin: Skin is warm and dry. She is not diaphoretic. No erythema.   Psychiatric: Affect normal.     EKG:  My independent visualization of most recent EKG is normal sinus rhythm    TTE:  · The stress echo portion of this study is negative for myocardial " ischemia.  · The ECG portion of this study is negative for myocardial ischemia.  · The test was stopped because the patient experienced fatigue.  · The patient's exercise capacity was above average.  · There were no arrhythmias during stress.  · Normal left ventricular systolic function. The estimated ejection fraction is 65%.  · Normal LV diastolic function.  · Normal right ventricular systolic function.  · Abnormal aortic valve ( see text)  · The estimated PA systolic pressure is 23 mmHg.  · Normal central venous pressure (3 mmHg).        Lipids:  Recent Labs   Lab 01/28/20  0840   LDL Cholesterol 134.4   HDL 54   Cholesterol 203 H      Renal:  Recent Labs   Lab 01/28/20  0840   Creatinine 0.8   Potassium 4.2   CO2 25   BUN, Bld 9     Liver:  Recent Labs   Lab 01/28/20  0840   AST 18   ALT 19       Assessment:     1. Aortic valve mass    2. Chest pain, unspecified type    3. Gastroesophageal reflux disease, esophagitis presence not specified      Plan:   1. Aortic valve mass  Reviewed echocardiogram independently, possible that on short axis views, the bottom of the dome of the leaflets was being captured, without the actual apposition of the leaflets, appearing like there were calcifications.  Nothing visualized on long axis, no embolic symptoms, no fever, no underlying valvular pathology, or risk factors.  Thus would like to repeat surface echocardiogram with focused attention on multiple long axis views of aortic valve.  - Echo Color Flow Doppler? Yes; Future    2. Chest pain, unspecified type  Suspect noncardiac, negative stress test, recommended appear clear taking Aleve for a week, and seeing if there was any relief.    3. Gastroesophageal reflux disease, esophagitis presence not specified  PPI is also potential etiology of pain      Follow up if symptoms worsen or fail to improve.

## 2020-02-07 ENCOUNTER — HOSPITAL ENCOUNTER (OUTPATIENT)
Dept: CARDIOLOGY | Facility: CLINIC | Age: 40
Discharge: HOME | End: 2020-02-07
Attending: INTERNAL MEDICINE
Payer: COMMERCIAL

## 2020-02-07 ENCOUNTER — PATIENT MESSAGE (OUTPATIENT)
Dept: CARDIOLOGY | Facility: CLINIC | Age: 40
End: 2020-02-07

## 2020-02-07 VITALS
WEIGHT: 195 LBS | SYSTOLIC BLOOD PRESSURE: 120 MMHG | HEART RATE: 64 BPM | BODY MASS INDEX: 34.55 KG/M2 | DIASTOLIC BLOOD PRESSURE: 55 MMHG | HEIGHT: 63 IN

## 2020-02-07 DIAGNOSIS — I35.8 AORTIC VALVE MASS: ICD-10-CM

## 2020-02-07 LAB
ASCENDING AORTA: 2.34 CM
AV INDEX (PROSTH): 0.94
AV MEAN GRADIENT: 2 MMHG
AV PEAK GRADIENT: 3 MMHG
AV VALVE AREA: 2.55 CM2
AV VELOCITY RATIO: 1.1
BSA FOR ECHO PROCEDURE: 1.98 M2
CV ECHO LV RWT: 0.36 CM
DOP CALC AO PEAK VEL: 0.9 M/S
DOP CALC AO VTI: 21.01 CM
DOP CALC LVOT AREA: 2.7 CM2
DOP CALC LVOT DIAMETER: 1.86 CM
DOP CALC LVOT PEAK VEL: 0.99 M/S
DOP CALC LVOT STROKE VOLUME: 53.66 CM3
DOP CALCLVOT PEAK VEL VTI: 19.76 CM
E WAVE DECELERATION TIME: 165.84 MSEC
E/A RATIO: 1.29
E/E' RATIO: 6.23 M/S
ECHO LV POSTERIOR WALL: 0.78 CM (ref 0.6–1.1)
FRACTIONAL SHORTENING: 41 % (ref 28–44)
INTERVENTRICULAR SEPTUM: 0.68 CM (ref 0.6–1.1)
IVRT: 0.07 MSEC
LA MAJOR: 4.09 CM
LA MINOR: 4.08 CM
LA WIDTH: 3.65 CM
LEFT ATRIUM SIZE: 3.03 CM
LEFT ATRIUM VOLUME INDEX: 20.1 ML/M2
LEFT ATRIUM VOLUME: 38.4 CM3
LEFT INTERNAL DIMENSION IN SYSTOLE: 2.54 CM (ref 2.1–4)
LEFT VENTRICLE DIASTOLIC VOLUME INDEX: 44.17 ML/M2
LEFT VENTRICLE DIASTOLIC VOLUME: 84.51 ML
LEFT VENTRICLE MASS INDEX: 49 G/M2
LEFT VENTRICLE SYSTOLIC VOLUME INDEX: 12.1 ML/M2
LEFT VENTRICLE SYSTOLIC VOLUME: 23.23 ML
LEFT VENTRICULAR INTERNAL DIMENSION IN DIASTOLE: 4.33 CM (ref 3.5–6)
LEFT VENTRICULAR MASS: 94.55 G
LV LATERAL E/E' RATIO: 5.79 M/S
LV SEPTAL E/E' RATIO: 6.75 M/S
MV PEAK A VEL: 0.63 M/S
MV PEAK E VEL: 0.81 M/S
PISA TR MAX VEL: 2.52 M/S
PULM VEIN S/D RATIO: 1.23
PV PEAK D VEL: 0.4 M/S
PV PEAK S VEL: 0.49 M/S
RA MAJOR: 3.73 CM
RA PRESSURE: 3 MMHG
RA WIDTH: 3.11 CM
RIGHT VENTRICULAR END-DIASTOLIC DIMENSION: 3.11 CM
RV TISSUE DOPPLER FREE WALL SYSTOLIC VELOCITY 1 (APICAL 4 CHAMBER VIEW): 13.94 CM/S
SINUS: 2.23 CM
STJ: 1.97 CM
TDI LATERAL: 0.14 M/S
TDI SEPTAL: 0.12 M/S
TDI: 0.13 M/S
TR MAX PG: 25 MMHG
TRICUSPID ANNULAR PLANE SYSTOLIC EXCURSION: 2.2 CM
TV REST PULMONARY ARTERY PRESSURE: 28 MMHG

## 2020-02-07 PROCEDURE — 93306 ECHO (CUPID ONLY): ICD-10-PCS | Mod: 26,,, | Performed by: INTERNAL MEDICINE

## 2020-02-07 PROCEDURE — 93306 TTE W/DOPPLER COMPLETE: CPT | Mod: 26,,, | Performed by: INTERNAL MEDICINE

## 2020-02-07 PROCEDURE — 93306 TTE W/DOPPLER COMPLETE: CPT

## 2020-03-05 ENCOUNTER — PATIENT MESSAGE (OUTPATIENT)
Dept: INTERNAL MEDICINE | Facility: CLINIC | Age: 40
End: 2020-03-05

## 2020-03-05 RX ORDER — METHOCARBAMOL 500 MG/1
500-1000 TABLET, FILM COATED ORAL NIGHTLY PRN
Qty: 30 TABLET | Refills: 1 | Status: SHIPPED | OUTPATIENT
Start: 2020-03-05 | End: 2020-05-08 | Stop reason: SDUPTHER

## 2020-04-21 DIAGNOSIS — Z01.84 ANTIBODY RESPONSE EXAMINATION: ICD-10-CM

## 2020-04-28 ENCOUNTER — PATIENT MESSAGE (OUTPATIENT)
Dept: SPINE | Facility: CLINIC | Age: 40
End: 2020-04-28

## 2020-04-28 RX ORDER — METHYLPREDNISOLONE 4 MG/1
TABLET ORAL
Qty: 1 PACKAGE | Refills: 0 | Status: SHIPPED | OUTPATIENT
Start: 2020-04-28 | End: 2020-05-08

## 2020-04-28 NOTE — TELEPHONE ENCOUNTER
Medrol pack prescribed to [facility] [place] pharmacy.    GIOVANNI Cardoza, PA-C  Neurosurgery  [facility]  [facility]

## 2020-05-07 ENCOUNTER — PATIENT OUTREACH (OUTPATIENT)
Dept: ADMINISTRATIVE | Facility: OTHER | Age: 40
End: 2020-05-07

## 2020-05-07 NOTE — PROGRESS NOTES
Chart reviewed.   Immunizations: Triggered Imm Registry     Orders placed: n/a  Upcoming appts to satisfy CANDIDA topics: n/a

## 2020-05-08 ENCOUNTER — OFFICE VISIT (OUTPATIENT)
Dept: SPINE | Facility: CLINIC | Age: 40
End: 2020-05-08
Attending: PHYSICAL MEDICINE & REHABILITATION
Payer: COMMERCIAL

## 2020-05-08 VITALS
HEART RATE: 83 BPM | HEIGHT: 63 IN | WEIGHT: 195 LBS | DIASTOLIC BLOOD PRESSURE: 78 MMHG | SYSTOLIC BLOOD PRESSURE: 124 MMHG | BODY MASS INDEX: 34.55 KG/M2

## 2020-05-08 DIAGNOSIS — M54.42 ACUTE LEFT-SIDED LOW BACK PAIN WITH LEFT-SIDED SCIATICA: ICD-10-CM

## 2020-05-08 DIAGNOSIS — G57.02 PIRIFORMIS SYNDROME, LEFT: Primary | ICD-10-CM

## 2020-05-08 PROCEDURE — 99214 PR OFFICE/OUTPT VISIT, EST, LEVL IV, 30-39 MIN: ICD-10-PCS | Mod: S$GLB,,, | Performed by: PHYSICAL MEDICINE & REHABILITATION

## 2020-05-08 PROCEDURE — 3008F BODY MASS INDEX DOCD: CPT | Mod: CPTII,S$GLB,, | Performed by: PHYSICAL MEDICINE & REHABILITATION

## 2020-05-08 PROCEDURE — 99999 PR PBB SHADOW E&M-EST. PATIENT-LVL III: ICD-10-PCS | Mod: PBBFAC,,, | Performed by: PHYSICAL MEDICINE & REHABILITATION

## 2020-05-08 PROCEDURE — 99999 PR PBB SHADOW E&M-EST. PATIENT-LVL III: CPT | Mod: PBBFAC,,, | Performed by: PHYSICAL MEDICINE & REHABILITATION

## 2020-05-08 PROCEDURE — 99214 OFFICE O/P EST MOD 30 MIN: CPT | Mod: S$GLB,,, | Performed by: PHYSICAL MEDICINE & REHABILITATION

## 2020-05-08 PROCEDURE — 3008F PR BODY MASS INDEX (BMI) DOCUMENTED: ICD-10-PCS | Mod: CPTII,S$GLB,, | Performed by: PHYSICAL MEDICINE & REHABILITATION

## 2020-05-08 RX ORDER — DICLOFENAC SODIUM 75 MG/1
75 TABLET, DELAYED RELEASE ORAL 2 TIMES DAILY
Qty: 60 TABLET | Refills: 2 | Status: SHIPPED | OUTPATIENT
Start: 2020-05-08 | End: 2023-02-27 | Stop reason: SDUPTHER

## 2020-05-08 RX ORDER — METHOCARBAMOL 500 MG/1
500 TABLET, FILM COATED ORAL 4 TIMES DAILY
Qty: 120 TABLET | Refills: 2 | Status: SHIPPED | OUTPATIENT
Start: 2020-05-08 | End: 2023-02-27 | Stop reason: SDUPTHER

## 2020-05-08 NOTE — PROGRESS NOTES
"Subjective:      Patient ID: Yanna Kwong is a 40 y.o. female.    Chief Complaint: Low-back Pain and Leg Pain    Ms Kwong is a 39 yo female here for follow up of her low back pain.  She was 23 min late for her appointment but full time was spent.  She was last seen by me on 9/8/2017 for right glute pain and piriformis syndrome that started in June 2017 and got better with robaxin and PT.  Today she is having left glute pain since February.  The pain is left side of my buttocks and the pain has been "pulling" on the exterior part of my left calf, most  recently radiating down at times toward my left ankle. I sometimes get fatigued when walking because of the extra effort it takes to move about with the pain... I take mini breaks if I do get a bit tired. She has been doing her PT exercises.  She does find sitting makes it worse.  The car is bad.  She has also been working from home and sitting on the sofa.  She feels like she has not been watching her posture as much.  She feels like sofa and bed are comfortable.  She wonders why keeps hurt.  She has pain with everything.  The pain is a weakness with walking.  The pain is a shooting, tingling sometimes.  It can be all words.  The pain is 4/10 now, worst 10/10 sitting or walking, best 4/10.  She feels like she walks and get tired and will start limping.  She took a medrol dose [place].  She was taking robaxin at night but did not feel like it helped.      X-ray lumbar 2017  There is normal alignment of the lumbar spine.  Vertebral body heights and disc space heights are preserved.  No fracture or dislocation.  No evidence of instability on the flexion/extension positioning.    Impression      Normal lumbar spine radiographs.      Past Medical History:  No date: Thyroid nodule    Past Surgical History:  No date: BREAST BIOPSY  No date: WISDOM TOOTH EXTRACTION    Review of patient's family history indicates:    Heart disease                  Mother                "     Cataracts                      Father                    Macular degeneration           Father                    Hypertension                   Paternal Grandfather      Prostate cancer                Paternal Grandfather      Diabetes                       Paternal Grandmother      Heart disease                  Paternal Grandmother      Heart attack                   Paternal Grandmother      Hypertension                   Maternal Grandmother      Hypertension                   Maternal Grandfather      Heart failure                  Maternal Grandfather      Heart attack                   Maternal Grandfather      Colon cancer                   Maternal Uncle            Stroke                         Maternal Uncle            Atrial fibrillation            Maternal Aunt             Hypertension                   Maternal Aunt             Heart attack                   Cousin                    Amblyopia                      Neg Hx                    Blindness                      Neg Hx                    Glaucoma                       Neg Hx                    Retinal detachment             Neg Hx                    Strabismus                     Neg Hx                    Thyroid disease                Neg Hx                    Breast cancer                  Neg Hx                    Ovarian cancer                 Neg Hx                      Social History    Marital status: Single              Spouse name:                       Years of education:                 Number of children:               Occupational History  Occupation          Employer            Comment                                   [employer]     Social History Main Topics    Smoking status: Never Smoker                                                                Smokeless tobacco: Never Used                        Alcohol use: No              Drug use: No              Sexual activity: No                      Comment: Never  sexually active    Social History Narrative    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     Graduated [employer]    Degree in graphic Design from [employer]        Current Outpatient Prescriptions:  ascorbic acid (VITAMIN C) 1000 MG tablet, Take 1,000 mg by mouth once daily., Disp: , Rfl:   b complex vitamins capsule, Take 1 capsule by mouth once daily., Disp: , Rfl:   BIOTIN ORAL, Take 1 tablet by mouth once daily., Disp: , Rfl:   fish oil-omega-3 fatty acids 300-1,000 mg capsule, Take 1 g by mouth once daily., Disp: , Rfl:   folic acid (FOLVITE) 400 MCG tablet, Take 400 mcg by mouth once daily., Disp: , Rfl:   ketorolac (TORADOL) 10 mg tablet, Take 1 tablet (10 mg total) by mouth every 8 (eight) hours. Use only 3 days per month, Disp: 18 tablet, Rfl: 2  methocarbamol (ROBAXIN) 500 MG Tab, Take 1-2 tablets (500-1,000 mg total) by mouth nightly as needed., Disp: 60 tablet, Rfl: 2  multivitamin (ONE DAILY MULTIVITAMIN) per tablet, Take 1 tablet by mouth once daily., Disp: , Rfl:   vitamin E 400 UNIT capsule, Take 400 Units by mouth once daily., Disp: , Rfl:     No current facility-administered medications for this visit.       Review of patient's allergies indicates:   -- Vicodin (hydrocodone-acetaminophen) -- Other (See Comments)    --  tongue   -- Erythromycin -- Itching and Rash   -- Ilosone -- Rash        Review of Systems   Constitution: Negative for weight gain and weight loss.   Cardiovascular: Negative for chest pain.   Respiratory: Negative for shortness of breath.    Musculoskeletal: Positive for back pain and joint pain. Negative for joint swelling and neck pain.   Gastrointestinal: Positive for nausea. Negative for abdominal pain, bowel incontinence and vomiting.   Genitourinary: Negative for bladder incontinence.   Neurological: Positive for paresthesias. Negative for numbness.         Objective:        General: Yanna is well-developed, well-nourished, appears stated age, in no acute  distress, alert and oriented to time, place and person.     General    Vitals reviewed.  Constitutional: She is oriented to person, place, and time. She appears well-developed and well-nourished.   HENT:   Head: Normocephalic and atraumatic.   Pulmonary/Chest: Effort normal.   Neurological: She is alert and oriented to person, place, and time.   Psychiatric: She has a normal mood and affect. Her behavior is normal. Judgment and thought content normal.     General Musculoskeletal Exam   Gait: normal     Right Ankle/Foot Exam     Tests   Heel Walk: able to perform  Tiptoe Walk: able to perform    Left Ankle/Foot Exam     Tests   Heel Walk: able to perform  Tiptoe Walk: able to perform  Back (L-Spine & T-Spine) / Neck (C-Spine) Exam     Tenderness Left paramedian tenderness of the Sacrum.     Back (L-Spine & T-Spine) Range of Motion   Extension: 20   Flexion: 70   Lateral bend right: 20   Lateral bend left: 20   Rotation right: 40   Rotation left: 40     Spinal Sensation   Right Side Sensation  C-Spine Level: normal   L-Spine Level: normal  S-Spine Level: normal  Left Side Sensation  C-Spine Level: normal  L-Spine Level: normal  S-Spine Level: normal    Back (L-Spine & T-Spine) Tests   Right Side Tests  Straight leg raise:      Sitting SLR: > 70 degrees      Left Side Tests  Straight leg raise:     Sitting SLR: > 70 degrees          Other She has no scoliosis .  Spinal Kyphosis:  Absent    Comments:  Pos pain with piriformis stretch on left and tender over piriformis.        Muscle Strength   Right Lower Extremity   Hip Flexion: 5/5   Quadriceps:  5/5   Anterior tibial:  5/5/5  EHL:  5/5  Left Lower Extremity   Hip Flexion: 5/5   Quadriceps:  5/5   Anterior tibial:  5/5/5   EHL:  5/5    Reflexes     Left Side  Biceps:  2+  Triceps:  2+  Brachioradialis:  2+  Quadriceps:  2+  Achilles:  2+  Left Mcduffie's Sign:  Absent  Babinski Sign:  absent    Right Side   Biceps:  2+  Triceps:  2+  Brachioradialis:  2+  Quadriceps:   2+  Achilles:  2+  Right Mcduffie's Sign:  absent  Babinski Sign:  absent    Vascular Exam     Right Pulses        Carotid:                  2+    Left Pulses        Carotid:                  2+              Assessment:       1. Piriformis syndrome, left    2. Acute left-sided low back pain with left-sided sciatica           Plan:       Orders Placed This Encounter    Ambulatory referral/consult to Physical/Occupational Therapy    methocarbamoL (ROBAXIN) 500 MG Tab    diclofenac (VOLTAREN) 75 MG EC tablet     1.  PT for back and core strengthening and piriformis strengthening and stretches and glute and quad strengthening and [facility]  2.  Continue activity, and work on posture sitting.  She has been sitting on the couch   3.  diclofenac 75mg po BID  4.   Robaxin 500 PO qid  5.  She was shown some stretches for the piriformis.  We also discussed self massage with tennis ball  6.  rtc 3 months      Follow-up: Follow up in about 3 months (around 8/8/2020). If there are any questions prior to this, the patient was instructed to contact the office.

## 2020-05-18 ENCOUNTER — CLINICAL SUPPORT (OUTPATIENT)
Dept: REHABILITATION | Facility: HOSPITAL | Age: 40
End: 2020-05-18
Attending: PHYSICAL MEDICINE & REHABILITATION
Payer: COMMERCIAL

## 2020-05-18 DIAGNOSIS — R26.89 DECREASED FUNCTIONAL MOBILITY: ICD-10-CM

## 2020-05-18 DIAGNOSIS — R26.89 DECREASED BACK MOBILITY: ICD-10-CM

## 2020-05-18 DIAGNOSIS — M62.81 WEAKNESS OF TRUNK MUSCULATURE: ICD-10-CM

## 2020-05-18 DIAGNOSIS — M54.50 PAIN OF LUMBAR SPINE WITH MOVEMENT: ICD-10-CM

## 2020-05-18 DIAGNOSIS — R25.9 DYSFUNCTIONAL MOVEMENTS: ICD-10-CM

## 2020-05-18 PROCEDURE — 97161 PT EVAL LOW COMPLEX 20 MIN: CPT | Mod: PO | Performed by: PHYSICAL THERAPIST

## 2020-05-18 NOTE — PROGRESS NOTES
[facility]  Physical Therapy Initial Evaluation    Name: Yanna Kwong  Clinic Number: [id number]    Therapy Diagnosis:   Encounter Diagnoses   Name Primary?    Pain of lumbar spine with movement     Dysfunctional movements     Weakness of trunk musculature     Decreased back mobility     Decreased functional mobility      Physician: Shirin Krishna, *    Physician Orders: PT Eval and Treat back   Medical Diagnosis from Referral:     M54.42 (ICD-10-CM) - Acute left-sided low back pain with left-sided sciatica    G57.02 (ICD-10-CM) - Piriformis syndrome, left  Evaluation Date: 5/18/2020  Authorization Period Expiration: 12/31/2020  Plan of Care Expiration: 8/18/2020  Visit # / Visits authorized: 1/ 20    Time In: 1730  Time Out: 1830  Total Billable Time: 60 minutes    Precautions: Standard    Subjective   Medical History:   Past Medical History:   Diagnosis Date    Thyroid nodule        Surgical History:   Yanna Kwong  has a past surgical history that includes [place] tooth extraction and Breast biopsy (Right, 2016).    Medications:   Yanna has a current medication list which includes the following prescription(s): ascorbic acid (vitamin c), b complex vitamins, biotin, diclofenac, fish oil-omega-3 fatty acids, fluocinonide, folic acid, ketoconazole, ketoconazole, methocarbamol, multivitamin, omeprazole, and vitamin e.    Allergies:   Review of patient's allergies indicates:   Allergen Reactions    Vicodin [hydrocodone-acetaminophen] Other (See Comments)     tongue    Erythromycin Itching and Rash    Ilosone Rash        [facility] of current condition - [facility]: stiffness low back; pain left gluteal and posterior thigh and lateral leg. Constant LLE and gluteal area     Date of onset: 2/28/2020. Gradual onset. Says she had started to exercise holding onto a chair and attempting ballet maneuvers when she began to notice the pain. Presently worse.  History of low back problems for about five years.      Pain:  Current 5/10, worst 10/10, best 3/10   Location: left LE posterior thigh, lateral leg and gluteal region and left low back   Description: Dull, Burning, Grabbing and Tingling  Aggravating Factors: Laying  Easing Factors: change position in bed or sitting     Sleep - yes   Cough / sneeze / strain - denies   B/B function - negative for pain     Current Level of Function:   Personal - no limitation   Domestic - able to perform but takes longer   Community / Social - limited due to comfort level   Occupation  - not limited   Sports/recreation/fitness - limited light weight lifting, walking outdoors     Prior Level of Function: not limited   Prior Therapy: yes for the low back. Was provided with a HEP from [facility]   Social History: single  lives alone single family home. Stairs x4 outdoors   Occupation:  for patient access     Pts goals: pain relief and effectively manage the pain   Imaging, none presently     Objective     Posture Alignment: unremarkable for significant deviations   Sensation: Light Touch: Intact  Palpation: there is no spasm or tightness identified in the low back. Negative for pain over the lumbar spinous processes, bilateral sacral sulcus, PSIS, iliolumbar region, left gluteal - mid.       Lumbar/Thoracic AROM: Pain/Dysfunction with Movement:   Flexion                              85/40 DP   Extension                          25/10 DP   Right side bending               30 P   Left side bending                  30 P    Right rotation DP - LLE    Left rotation DP       LOWER EXTREMITY STRENGTH:   Left  5/5 Right  5/5     DTR's:   Left Right   Patella Tendon 2+ 2+   Achilles Tendon 2+ 2+           Selective Functional Movement Assessment:  FN: functional, non-painful  FP: functional, painful  DP: dysfunctional, painful  DN: dysfunctional, non-painful  Multi-Segmental Flexion: see above   Multi-Segmental Extension:  see above   Multi-Segmental Rotation:   Right:see above   Left:see above     FUNCTIONAL MOVEMENT BREAKOUTS:  Long sitting  = DP  SLR   -Active   R - FN  L -  DP  -Passive  R - FN  L - FP  Knees to chest   Active - FP     FLEXIBILITY  Hamstrings R 75/80     L  70/75  Piriformis      L  -no limitation     Special Tests   Left Right   SLR Neg    Neg       [place] Neg  Neg    Endy Neg - pain lateral leg Neg        GAIT: ambulates with no assistive device with independently.     GAIT DEVIATIONS: displays no significant deviations       Education provided:   - pillow positioning for sleeping at night.     Written Home Exercises Provided:     CMS Impairment/Limitation/Restriction for FOTO BACK Survey             Assessment         Yanna is a 40 y.o. female referred to outpatient Physical Therapy with a medical diagnosis of acute low back pain and piriformis syndrome Left . Pt presents with clinical findings of a hip flexion pain dysfunction, thoracic extension / rotation pain dysfunction and a lumbar extension / rotation pain dysfunction.           Evaluation has determined a decrease in functional status and subjective and objective deficits that can be addressed by physical therapy intervention. Pt demonstrates pain limiting functional activities. Decreased flexibility and strength limiting normal movement patterns. Decreased postural strength and awareness.  Decreased participation in functional and recreational activities. Subjective and objective measures are addressed by goals in the plan of care. Patient/family are involved in the development of these goals. Patient/family are educated about current injury and treatment.    .Current impairments limits patient with all functional activities.   Pt prognosis is Fair.   Pt will benefit from skilled outpatient Physical Therapy to address the deficits stated above and in the chart below, provide pt/family education, and to maximize pt's level of independence.     Plan  of care discussed with patient: Yes  Pt's spiritual, cultural and educational needs considered and patient is agreeable to the plan of care and goals as stated below:       Anticipated Barriers for therapy: none     Medical Necessity is demonstrated by the following  History  Co-morbidities and personal factors that may impact the plan of care Co-morbidities:   difficulty sleeping and history of low back pain     Personal Factors:   no deficits     low   Examination  Body Structures and Functions, activity limitations and participation restrictions that may impact the plan of care Body Regions:   back    Body Systems:    musculoskeletal     Participation Restrictions:   None     Activity limitations:   Learning and applying knowledge  no deficits    General Tasks and Commands  no deficits    Communication  no deficits    Mobility  walking    Self care  no deficits    Domestic Life  doing house work (cleaning house, washing dishes, laundry) takes longer    Interactions/Relationships  no deficits    Life Areas  no deficits    Community and Social Life  community life  recreation and leisure         low   Clinical Presentation stable and uncomplicated low   Decision Making/ Complexity Score: low           Short Term GOALS: 5 weeks. Pt agrees with goals set.  1. Pts pain intensity decreased 20-40% for improved quality of life and functional mobilty  2. Pt to demonstrate core activation with spinal stability during transitional movements for improved quality of movement  3. Pt to experience 50% or more reduced interruption of sleep due to reduced pain for improved quality of life  4. Patient demonstrates independence with HEP for self management   5. Patient demonstrates independence with Postural Awareness for control of pain   6. Pt demonstrates active SLR to 70 degrees to improve functional mobility     Long Term GOALS: 10 weeks. Pt agrees with goals set.  1. Patient demonstrates increased spine mobility in all planes  of movement with 50% or greater reduction of pain to improve functional mobility and tolerance to functional activities.   2. Patient demonstrates increased BLE hip extension mobility to 10 degrees or greater to improve functional mobility   3. Patient demonstrates improved overall function and return demonstration to functional ADL and recreational lifestyle  4. Pt demonstrates  [place] with body mechanics to control episodes of pain associated with daily ADL and improve functional lifestyle.           Plan       Plan of care Certification: 5/18/2020 to 8/18/2020.    Outpatient Physical Therapy 2 times weekly for 10 weeks to include the following interventions: Manual Therapy, Patient Education and Therapeutic Exercise.     Jeffery Leiva, COLETTE      Therapist: Jeffery Leiva, PT

## 2020-05-20 ENCOUNTER — CLINICAL SUPPORT (OUTPATIENT)
Dept: REHABILITATION | Facility: HOSPITAL | Age: 40
End: 2020-05-20
Attending: PHYSICAL MEDICINE & REHABILITATION
Payer: COMMERCIAL

## 2020-05-20 DIAGNOSIS — M54.50 PAIN OF LUMBAR SPINE WITH MOVEMENT: ICD-10-CM

## 2020-05-20 DIAGNOSIS — M62.81 WEAKNESS OF TRUNK MUSCULATURE: ICD-10-CM

## 2020-05-20 DIAGNOSIS — R26.89 DECREASED BACK MOBILITY: ICD-10-CM

## 2020-05-20 DIAGNOSIS — R25.9 DYSFUNCTIONAL MOVEMENTS: ICD-10-CM

## 2020-05-20 DIAGNOSIS — R26.89 DECREASED FUNCTIONAL MOBILITY: ICD-10-CM

## 2020-05-20 PROCEDURE — 97110 THERAPEUTIC EXERCISES: CPT | Mod: PO,CQ

## 2020-05-20 NOTE — PROGRESS NOTES
"  Physical Therapy Daily Treatment Note     Name: Yanna Kwong  Clinic Number: [id number]    Therapy Diagnosis:   Encounter Diagnoses   Name Primary?    Pain of lumbar spine with movement     Dysfunctional movements     Weakness of trunk musculature     Decreased back mobility     Decreased functional mobility      Physician: Shirin Krishna, *    Visit Date: 5/20/2020    Physician Orders: PT Eval and Treat back   Medical Diagnosis:  M54.42 (ICD-10-CM) - Acute left-sided low back pain with left-sided sciatica    G57.02 (ICD-10-CM) - Piriformis syndrome, left  Evaluation Date: 5/18/2020  Authorization Period Expiration: 12/31/2020  Plan of Care Certification Period: 8/18/2020  Visit #/Visits authorized: 2/20     Time In: 4:30  Time Out: 5:15  Total Billable Time: 45 minutes    Precautions: Standard    Subjective     Pt reports: she feels a lot better today after foam rolling her left buttock area last night. Was excited to feel her toes all day today.   She will perform her home exercise program this session.  Response to previous treatment: no complaints   Functional change: ongoing     Pain: 4/10  Location: left ankles, calf, back, and buttocks      Objective     Obi received therapeutic exercises to develop strength, endurance, ROM, flexibility, posture and core stabilization for 45 minutes including:    Hamstring stretch 3 x 30"  Calf stretch with band 3 x 30"  Figure 4 stretch 3 x 30"  Hip flexor stretch off edge of mat 2 x 30"  DKTC 10 x 10" hold  Trunk rotations x 10 reps   TA draw  X 2 min with 5" hold   TA draw with knee fall out x 10 each side   Pelvic tilts x 15   Isometric heel push into mat x 10 each side         Obi received the following manual therapy techniques: Myofacial release and Soft tissue Mobilization were applied to the: back  for 0 minutes, including:        Home Exercises Provided and Patient Education Provided     Education provided:   - pillow positioning for sleeping " at night    Written Home Exercises Provided: Patient instructed to cont prior HEP and add exercises from today's session.   Exercises were reviewed and Obi was able to demonstrate them prior to the end of the session.  Obi demonstrated good  understanding of the education provided.     See EMR under Media for exercises provided 5/20/2020.    Assessment     Obi tolerated today's session well with focus on HEP, stretching, strengthening, and education for abdominal bracing during daily activities. Obi presents with endurance and minor gait deviations upon entry into gym with decreased weightbearing and heel strike on LLE.  Pt was educated and is interested in possible dry needling in future visits. Obi is very motivated and eager to decrease her pain and discomfort to improve her overall mobility.     Obi is progressing well towards her goals.   Pt prognosis is Fair.     Pt will continue to benefit from skilled outpatient physical therapy to address the deficits listed in the problem list box on initial evaluation, provide pt/family education and to maximize pt's level of independence in the home and community environment.     Pt's spiritual, cultural and educational needs considered and pt agreeable to plan of care and goals.     Anticipated barriers to physical therapy: none    Goals:   Short Term GOALS: 5 weeks. Pt agrees with goals set.  1. Pts pain intensity decreased 20-40% for improved quality of life and functional mobilty  2. Pt to demonstrate core activation with spinal stability during transitional movements for improved quality of movement  3. Pt to experience 50% or more reduced interruption of sleep due to reduced pain for improved quality of life  4. Patient demonstrates independence with HEP for self management   5. Patient demonstrates independence with Postural Awareness for control of pain   6. Pt demonstrates active SLR to 70 degrees to improve functional mobility      Long Term GOALS: 10 weeks. Pt  agrees with goals set.  1. Patient demonstrates increased spine mobility in all planes of movement with 50% or greater reduction of pain to improve functional mobility and tolerance to functional activities.   2. Patient demonstrates increased BLE hip extension mobility to 10 degrees or greater to improve functional mobility   3. Patient demonstrates improved overall function and return demonstration to functional ADL and recreational lifestyle  4. Pt demonstrates  [place] with body mechanics to control episodes of pain associated with daily ADL and improve functional lifestyle.          Plan     Continue per plan of care.     Rosario Ambrocio, PTA

## 2020-05-21 DIAGNOSIS — Z01.84 ANTIBODY RESPONSE EXAMINATION: ICD-10-CM

## 2020-05-22 NOTE — PROGRESS NOTES
"  Physical Therapy Daily Treatment Note     Name: Yanna Kwong  Clinic Number: [id number]    Therapy Diagnosis:   Encounter Diagnoses   Name Primary?    Pain of lumbar spine with movement     Dysfunctional movements     Weakness of trunk musculature     Decreased back mobility     Decreased functional mobility      Physician: Shirin Krishna, *    Visit Date: 5/25/2020    Physician Orders: PT Eval and Treat back   Medical Diagnosis:  M54.42 (ICD-10-CM) - Acute left-sided low back pain with left-sided sciatica    G57.02 (ICD-10-CM) - Piriformis syndrome, left  Evaluation Date: 5/18/2020  Authorization Period Expiration: 12/31/2020  Plan of Care Certification Period: 8/18/2020  Visit #/Visits authorized: 3/20     Time In: 8:00  Time Out: 8:45  Total Billable Time: 45 minutes (3TE)    Precautions: Standard    Subjective     Pt reports: she is feeling better today. She only has tingling in her foot.    She was compliant with her home exercise program this session.  Response to previous treatment: no issues  Functional change: ongoing     Pain: 0/10  Tingling in left foot   Location: left ankles, calf, back, and buttocks      Objective     Obi received therapeutic exercises to develop strength, endurance, ROM, flexibility, posture and core stabilization for 45 minutes including:    Hamstring stretch 3 x 30"  Calf stretch with band 3 x 30"  Figure 4 stretch 3 x 30"  DKTC 10 x 10" hold   TA draw  X 2 min with 5" hold   TA draw with knee fall out x 15 each side   Pelvic tilts x 15   Isometric heel push into mat x 10 single each side   Hip abduction with OTB x 10   Hip adduction with ball 10 x 5" hold   Core isometrics with orange ball 10 x 3" hold each direction       Obi received the following manual therapy techniques: Myofacial release and Soft tissue Mobilization were applied to the: back  for 0 minutes, including:        Home Exercises Provided and Patient Education Provided     Education provided: "   - pillow positioning for sleeping at night    Written Home Exercises Provided: Patient instructed to cont prior HEP and add exercises from today's session.   Exercises were reviewed and Obi was able to demonstrate them prior to the end of the session.  Obi demonstrated good  understanding of the education provided.     See EMR under Media for exercises provided 5/20/2020.     Assessment     Obi tolerated treatment well with no increase in symptoms or discomfort. Pt was able to perform added core, hips, and posterior chain musculature exercises this session to improve posture, flexibility, and strength to improve her overall mobility while decreasing her symptoms. Obi's gait post treatment was better following this session versus previous.     Obi is progressing well towards her goals.   Pt prognosis is Fair.     Pt will continue to benefit from skilled outpatient physical therapy to address the deficits listed in the problem list box on initial evaluation, provide pt/family education and to maximize pt's level of independence in the home and community environment.     Pt's spiritual, cultural and educational needs considered and pt agreeable to plan of care and goals.     Anticipated barriers to physical therapy: none    Goals:   Short Term GOALS: 5 weeks. Pt agrees with goals set.  1. Pts pain intensity decreased 20-40% for improved quality of life and functional mobilty  2. Pt to demonstrate core activation with spinal stability during transitional movements for improved quality of movement  3. Pt to experience 50% or more reduced interruption of sleep due to reduced pain for improved quality of life  4. Patient demonstrates independence with HEP for self management   5. Patient demonstrates independence with Postural Awareness for control of pain   6. Pt demonstrates active SLR to 70 degrees to improve functional mobility      Long Term GOALS: 10 weeks. Pt agrees with goals set.  1. Patient demonstrates  increased spine mobility in all planes of movement with 50% or greater reduction of pain to improve functional mobility and tolerance to functional activities.   2. Patient demonstrates increased BLE hip extension mobility to 10 degrees or greater to improve functional mobility   3. Patient demonstrates improved overall function and return demonstration to functional ADL and recreational lifestyle  4. Pt demonstrates  [place] with body mechanics to control episodes of pain associated with daily ADL and improve functional lifestyle.          Plan     Continue per plan of care.     Rosario Ambrocio, PTA

## 2020-05-25 ENCOUNTER — CLINICAL SUPPORT (OUTPATIENT)
Dept: REHABILITATION | Facility: HOSPITAL | Age: 40
End: 2020-05-25
Attending: PHYSICAL MEDICINE & REHABILITATION
Payer: COMMERCIAL

## 2020-05-25 DIAGNOSIS — R26.89 DECREASED FUNCTIONAL MOBILITY: ICD-10-CM

## 2020-05-25 DIAGNOSIS — R25.9 DYSFUNCTIONAL MOVEMENTS: ICD-10-CM

## 2020-05-25 DIAGNOSIS — M62.81 WEAKNESS OF TRUNK MUSCULATURE: ICD-10-CM

## 2020-05-25 DIAGNOSIS — R26.89 DECREASED BACK MOBILITY: ICD-10-CM

## 2020-05-25 DIAGNOSIS — M54.50 PAIN OF LUMBAR SPINE WITH MOVEMENT: ICD-10-CM

## 2020-05-25 PROCEDURE — 97110 THERAPEUTIC EXERCISES: CPT | Mod: PO,CQ

## 2020-05-29 ENCOUNTER — CLINICAL SUPPORT (OUTPATIENT)
Dept: REHABILITATION | Facility: HOSPITAL | Age: 40
End: 2020-05-29
Attending: PHYSICAL MEDICINE & REHABILITATION
Payer: COMMERCIAL

## 2020-05-29 DIAGNOSIS — M62.81 WEAKNESS OF TRUNK MUSCULATURE: ICD-10-CM

## 2020-05-29 DIAGNOSIS — R26.89 DECREASED BACK MOBILITY: ICD-10-CM

## 2020-05-29 DIAGNOSIS — R26.89 DECREASED FUNCTIONAL MOBILITY: ICD-10-CM

## 2020-05-29 DIAGNOSIS — M54.50 PAIN OF LUMBAR SPINE WITH MOVEMENT: ICD-10-CM

## 2020-05-29 DIAGNOSIS — R25.9 DYSFUNCTIONAL MOVEMENTS: ICD-10-CM

## 2020-05-29 PROCEDURE — 97110 THERAPEUTIC EXERCISES: CPT | Mod: PO,CQ

## 2020-05-29 PROCEDURE — 97140 MANUAL THERAPY 1/> REGIONS: CPT | Mod: PO,CQ

## 2020-05-29 NOTE — PROGRESS NOTES
"  Physical Therapy Daily Treatment Note     Name: Yanna Kwong  Clinic Number: [id number]    Therapy Diagnosis:   Encounter Diagnoses   Name Primary?    Pain of lumbar spine with movement     Dysfunctional movements     Weakness of trunk musculature     Decreased back mobility     Decreased functional mobility      Physician: Shirin Krishna, *    Visit Date: 5/29/2020    Physician Orders: PT Eval and Treat back   Medical Diagnosis:  M54.42 (ICD-10-CM) - Acute left-sided low back pain with left-sided sciatica    G57.02 (ICD-10-CM) - Piriformis syndrome, left  Evaluation Date: 5/18/2020  Authorization Period Expiration: 12/31/2020  Plan of Care Certification Period: 8/18/2020  Visit #/Visits authorized: 4/20     Time In: 11:05  Time Out: 11:45  Total Billable Time: 40 minutes (3TE)    Precautions: Standard    Subjective     Pt reports: she was on her feet cooking on Wednesday and she is still feeling the discomfort.    She was compliant with her home exercise program this session.  Response to previous treatment: no issues  Functional change: ongoing     Pain: 3/10  Tingling on side of left foot and last two toes   Location: left ankles, calf, back, and buttocks      Objective     Obi received therapeutic exercises to develop strength, endurance, ROM, flexibility, posture and core stabilization for 32 minutes including:    Supine:  Hamstring stretch 3 x 30"  Calf stretch with band 3 x 30"  Figure 4 stretch 3 x 30"  DKTC 10 x 10" hold   TA draw  X 20  with 5" hold   Pelvic tilts x 15   Bridges x 10   Single Hip abduction with OTB x 10   Hip adduction with ball 10 x 5" hold        Green foam roller to L glut, IT band, and hip flexor for 8 minutes      Not performed:  Isometric heel push into mat x 10 single each side   Core isometrics with orange ball 10 x 3" hold each direction  Deadbugs     Home Exercises Provided and Patient Education Provided     Education provided:   - tennis ball massage to " piriformis at home     Written Home Exercises Provided: Patient instructed to cont prior HEP and add exercises from today's session.   Exercises were reviewed and Obi was able to demonstrate them prior to the end of the session.  Obi demonstrated good  understanding of the education provided.     See EMR under Media for exercises provided 5/20/2020.     Assessment     Patient tolerated tx with minor discomfort in piriformis. Obi presents with gait deviations; decreased heel strike, decreased weightbearing, and slight hip drop on left lower extremity ambulating into clinic, slightly improved gait following therapy session. Dry needling was mentioned to patient and she is somewhat interested in it.     Obi is progressing well towards her goals.   Pt prognosis is Fair.     Pt will continue to benefit from skilled outpatient physical therapy to address the deficits listed in the problem list box on initial evaluation, provide pt/family education and to maximize pt's level of independence in the home and community environment.     Pt's spiritual, cultural and educational needs considered and pt agreeable to plan of care and goals.     Anticipated barriers to physical therapy: none    Goals:   Short Term GOALS: 5 weeks. Pt agrees with goals set.  1. Pts pain intensity decreased 20-40% for improved quality of life and functional mobilty  2. Pt to demonstrate core activation with spinal stability during transitional movements for improved quality of movement  3. Pt to experience 50% or more reduced interruption of sleep due to reduced pain for improved quality of life  4. Patient demonstrates independence with HEP for self management   5. Patient demonstrates independence with Postural Awareness for control of pain   6. Pt demonstrates active SLR to 70 degrees to improve functional mobility      Long Term GOALS: 10 weeks. Pt agrees with goals set.  1. Patient demonstrates increased spine mobility in all planes of movement  with 50% or greater reduction of pain to improve functional mobility and tolerance to functional activities.   2. Patient demonstrates increased BLE hip extension mobility to 10 degrees or greater to improve functional mobility   3. Patient demonstrates improved overall function and return demonstration to functional ADL and recreational lifestyle  4. Pt demonstrates  [place] with body mechanics to control episodes of pain associated with daily ADL and improve functional lifestyle.          Plan     Continue per plan of care.     Rosario Ambrocio, PTA

## 2020-06-01 ENCOUNTER — CLINICAL SUPPORT (OUTPATIENT)
Dept: REHABILITATION | Facility: HOSPITAL | Age: 40
End: 2020-06-01
Attending: PHYSICAL MEDICINE & REHABILITATION
Payer: COMMERCIAL

## 2020-06-01 DIAGNOSIS — M54.50 PAIN OF LUMBAR SPINE WITH MOVEMENT: ICD-10-CM

## 2020-06-01 DIAGNOSIS — R26.89 DECREASED BACK MOBILITY: ICD-10-CM

## 2020-06-01 DIAGNOSIS — M62.81 WEAKNESS OF TRUNK MUSCULATURE: ICD-10-CM

## 2020-06-01 DIAGNOSIS — R26.89 DECREASED FUNCTIONAL MOBILITY: ICD-10-CM

## 2020-06-01 DIAGNOSIS — R25.9 DYSFUNCTIONAL MOVEMENTS: ICD-10-CM

## 2020-06-01 PROCEDURE — 97110 THERAPEUTIC EXERCISES: CPT | Mod: PO | Performed by: PHYSICAL THERAPIST

## 2020-06-01 NOTE — PROGRESS NOTES
"  Physical Therapy Daily Treatment Note     Name: Yanna Kwong  Clinic Number: [id number]    Therapy Diagnosis:   Encounter Diagnoses   Name Primary?    Pain of lumbar spine with movement     Dysfunctional movements     Weakness of trunk musculature     Decreased back mobility     Decreased functional mobility      Physician: Shirin Krishna, *    Visit Date: 6/1/2020    Physician Orders: PT Eval and Treat back   Medical Diagnosis:  M54.42 (ICD-10-CM) - Acute left-sided low back pain with left-sided sciatica    G57.02 (ICD-10-CM) - Piriformis syndrome, left  Evaluation Date: 5/18/2020  Authorization Period Expiration: 12/31/2020  Plan of Care Certification Period: 8/18/2020  Visit #/Visits authorized: 5/20     Time In: 1740  Time Out: 1830  Total Billable Time: 50 minutes (3TE)    Precautions: Standard    Subjective     Pt reports: she has some pain in the back of the calf and tingling in the side of the foot and toes.   She was compliant with her home exercise program this session.  Response to previous treatment: felt OK, no serious pain.   Functional change: unchanged   Pain: 3/10  Tingling on side of left foot and last two toes   Location: left posterior leg pain     Objective     Obi received therapeutic exercises to develop strength, endurance, ROM, flexibility, posture and core stabilization for 32 minutes including:    Upper cycle 6'     Supine:  Hamstring stretch 6" x 10   Calf stretch with band 3 x 30"  Figure 4 stretch 10" x 12   DKTC 10 x 10" hold   TA draw  X 15  with 5" hold   Pelvic tilts x 15   Bridges x 15  Single Hip abduction with OTB x 10   Hip adduction with ball 10 x 5" hold   LTR opp arm OH    SIDELYING    PRONE  -press up = DN    Green foam roller to L glut, IT band, and hip flexor for 8 minutes    MANUAL THERAPY: leg distraction   Not performed:  Isometric heel push into mat x 10 single each side   Core isometrics with orange ball 10 x 3" hold each direction  Deadbugs "     Home Exercises Provided and Patient Education Provided     Education provided:   - tennis ball massage to piriformis at home   -continue ice/heat PRN   Written Home Exercises Provided: Patient instructed to cont prior HEP and add exercises from today's session.   Exercises were reviewed and Obi was able to demonstrate them prior to the end of the session.  Obi demonstrated good  understanding of the education provided.     See EMR under Media for exercises provided 5/20/2020.     Assessment     The patient completed the activities noted. She did report that she did not recognize foot tingling or leg pain when performing press ups. She did not describe increased symptoms during the performance of the activities. Good response to the exercises.   Obi is progressing well towards her goals.   Pt prognosis is Fair.     Pt will continue to benefit from skilled outpatient physical therapy to address the deficits listed in the problem list box on initial evaluation, provide pt/family education and to maximize pt's level of independence in the home and community environment.     Pt's spiritual, cultural and educational needs considered and pt agreeable to plan of care and goals.     Anticipated barriers to physical therapy: none    Goals:   Short Term GOALS: 5 weeks. Pt agrees with goals set.  1. Pts pain intensity decreased 20-40% for improved quality of life and functional mobilty  2. Pt to demonstrate core activation with spinal stability during transitional movements for improved quality of movement  3. Pt to experience 50% or more reduced interruption of sleep due to reduced pain for improved quality of life  4. Patient demonstrates independence with HEP for self management   5. Patient demonstrates independence with Postural Awareness for control of pain   6. Pt demonstrates active SLR to 70 degrees to improve functional mobility      Long Term GOALS: 10 weeks. Pt agrees with goals set.  1. Patient demonstrates  increased spine mobility in all planes of movement with 50% or greater reduction of pain to improve functional mobility and tolerance to functional activities.   2. Patient demonstrates increased BLE hip extension mobility to 10 degrees or greater to improve functional mobility   3. Patient demonstrates improved overall function and return demonstration to functional ADL and recreational lifestyle  4. Pt demonstrates  [place] with body mechanics to control episodes of pain associated with daily ADL and improve functional lifestyle.          Plan     Continue per plan of care.     Jeffery Leiva, PT

## 2020-06-08 ENCOUNTER — CLINICAL SUPPORT (OUTPATIENT)
Dept: REHABILITATION | Facility: HOSPITAL | Age: 40
End: 2020-06-08
Attending: PHYSICAL MEDICINE & REHABILITATION
Payer: COMMERCIAL

## 2020-06-08 DIAGNOSIS — M54.50 PAIN OF LUMBAR SPINE WITH MOVEMENT: ICD-10-CM

## 2020-06-08 DIAGNOSIS — R26.89 DECREASED FUNCTIONAL MOBILITY: ICD-10-CM

## 2020-06-08 DIAGNOSIS — M62.81 WEAKNESS OF TRUNK MUSCULATURE: ICD-10-CM

## 2020-06-08 DIAGNOSIS — R26.89 DECREASED BACK MOBILITY: ICD-10-CM

## 2020-06-08 DIAGNOSIS — R25.9 DYSFUNCTIONAL MOVEMENTS: ICD-10-CM

## 2020-06-08 PROCEDURE — 97140 MANUAL THERAPY 1/> REGIONS: CPT | Mod: PO,CQ

## 2020-06-08 PROCEDURE — 97110 THERAPEUTIC EXERCISES: CPT | Mod: PO,CQ

## 2020-06-08 NOTE — PROGRESS NOTES
"  Physical Therapy Daily Treatment Note     Name: Yanna Kwong  Clinic Number: [id number]    Therapy Diagnosis:   Encounter Diagnoses   Name Primary?    Pain of lumbar spine with movement     Dysfunctional movements     Weakness of trunk musculature     Decreased back mobility     Decreased functional mobility      Physician: Shirin Krishna, *    Visit Date: 6/8/2020    Physician Orders: PT Eval and Treat back   Medical Diagnosis:  M54.42 (ICD-10-CM) - Acute left-sided low back pain with left-sided sciatica    G57.02 (ICD-10-CM) - Piriformis syndrome, left  Evaluation Date: 5/18/2020  Authorization Period Expiration: 12/31/2020  Plan of Care Certification Period: 8/18/2020  Visit #/Visits authorized: 6 / 20     Time In: 09:30 am   Time Out: 10:15 am   Total Billable Time: 45 minutes (TE- 3 )    Precautions: Standard    Subjective     Pt reports: She has severe pain down LLE   She was compliant with her home exercise program this session.  Response to previous treatment: felt OK, no serious pain.   Functional change: unchanged   Pain: 3/10  Tingling on side of left foot and last two toes   Location: left posterior leg pain     Objective     Obi received therapeutic exercises to develop strength, endurance, ROM, flexibility, posture and core stabilization for 30 minutes including:    Up right bike: 7  minutes    Supine:  Hamstring stretch 6" x 10   Calf stretch with band 3 x 30"  Figure 4 stretch 10" x 12   DKTC 10 x 10" hold   TA draw with pelvic tilts  X 15  with 5" hold   TA with march in table top position: x 20     Bridges x 15  Single Hip abduction with OTB x 10   Hip adduction with ball 10 x 5" hold   LTR opp arm OH    SIDELYING    PRONE  -press up = DN    Green foam roller to L glut, IT band, and hip flexor for 8 minutes    MANUAL THERAPY: leg distraction  Soft tissue massage to piriformis x 10 minutes      Not performed:  Isometric heel push into mat x 10 single each side   Core isometrics " "with orange ball 10 x 3" hold each direction  Deadbugs     Home Exercises Provided and Patient Education Provided     Education provided:   - tennis ball massage to piriformis at home   -continue ice/heat PRN   Written Home Exercises Provided: Patient instructed to cont prior HEP and add exercises from today's session.   Exercises were reviewed and Obi was able to demonstrate them prior to the end of the session.  Obi demonstrated good  understanding of the education provided.     See EMR under Media for exercises provided 5/20/2020.     Assessment     Patient tolerated  Therapy session fairly well. Patient with noted core and hip musculature weakness and will continue to benefit from skilled physical therapy to address deficits.   Obi is progressing well towards her goals.   Pt prognosis is Fair.     Pt will continue to benefit from skilled outpatient physical therapy to address the deficits listed in the problem list box on initial evaluation, provide pt/family education and to maximize pt's level of independence in the home and community environment.     Pt's spiritual, cultural and educational needs considered and pt agreeable to plan of care and goals.     Anticipated barriers to physical therapy: none    Goals:   Short Term GOALS: 5 weeks. Pt agrees with goals set.  1. Pts pain intensity decreased 20-40% for improved quality of life and functional mobilty  2. Pt to demonstrate core activation with spinal stability during transitional movements for improved quality of movement  3. Pt to experience 50% or more reduced interruption of sleep due to reduced pain for improved quality of life  4. Patient demonstrates independence with HEP for self management   5. Patient demonstrates independence with Postural Awareness for control of pain   6. Pt demonstrates active SLR to 70 degrees to improve functional mobility      Long Term GOALS: 10 weeks. Pt agrees with goals set.  1. Patient demonstrates increased spine " mobility in all planes of movement with 50% or greater reduction of pain to improve functional mobility and tolerance to functional activities.   2. Patient demonstrates increased BLE hip extension mobility to 10 degrees or greater to improve functional mobility   3. Patient demonstrates improved overall function and return demonstration to functional ADL and recreational lifestyle  4. Pt demonstrates  [place] with body mechanics to control episodes of pain associated with daily ADL and improve functional lifestyle.          Plan     Continue per plan of care     Candelaria Esparza, PTA

## 2020-06-12 ENCOUNTER — CLINICAL SUPPORT (OUTPATIENT)
Dept: REHABILITATION | Facility: HOSPITAL | Age: 40
End: 2020-06-12
Attending: PHYSICAL MEDICINE & REHABILITATION
Payer: COMMERCIAL

## 2020-06-12 DIAGNOSIS — M54.50 PAIN OF LUMBAR SPINE WITH MOVEMENT: ICD-10-CM

## 2020-06-12 DIAGNOSIS — R26.89 DECREASED BACK MOBILITY: ICD-10-CM

## 2020-06-12 DIAGNOSIS — M62.81 WEAKNESS OF TRUNK MUSCULATURE: ICD-10-CM

## 2020-06-12 DIAGNOSIS — R26.89 DECREASED FUNCTIONAL MOBILITY: ICD-10-CM

## 2020-06-12 DIAGNOSIS — R25.9 DYSFUNCTIONAL MOVEMENTS: ICD-10-CM

## 2020-06-12 PROCEDURE — 97140 MANUAL THERAPY 1/> REGIONS: CPT | Mod: PO,CQ

## 2020-06-12 PROCEDURE — 97110 THERAPEUTIC EXERCISES: CPT | Mod: PO,CQ

## 2020-06-12 NOTE — PROGRESS NOTES
"  Physical Therapy Daily Treatment Note     Name: Yanna Kwong  Clinic Number: [id number]    Therapy Diagnosis:   Encounter Diagnoses   Name Primary?    Pain of lumbar spine with movement     Dysfunctional movements     Weakness of trunk musculature     Decreased back mobility     Decreased functional mobility      Physician: Shirin Krishna, *    Visit Date: 6/12/2020    Physician Orders: PT Eval and Treat back   Medical Diagnosis:  M54.42 (ICD-10-CM) - Acute left-sided low back pain with left-sided sciatica    G57.02 (ICD-10-CM) - Piriformis syndrome, left  Evaluation Date: 5/18/2020  Authorization Period Expiration: 12/31/2020  Plan of Care Certification Period: 8/18/2020  Visit #/Visits authorized: 6 / 20     Time In: 12:15 pm    Time Out: 01:00 pm   Total Billable Time: 45 minutes (TE- 2 MT -1  )    Precautions: Standard    Subjective     Pt reports: She has severe pain down LLE.  She was compliant with her home exercise program this session.  Response to previous treatment: felt OK, no serious pain.   Functional change: unchanged   Pain: 3/10  Tingling on side of left foot and last two toes   Location: left posterior leg pain     Objective     Obi received therapeutic exercises to develop strength, endurance, ROM, flexibility, posture and core stabilization for 30 minutes including:    Up right bike: 7  minutes    Supine:  Hamstring stretch 6" x 10   Calf stretch with band 3 x 30"  Figure 4 stretch 10" x 12   DKTC 10 x 10" hold   TA draw with pelvic tilts  X 15  with 5" hold   TA with march in table top position: x 20   Bridges x 15  Single Hip abduction with OTB x 10   Hip adduction with ball 10 x 5" hold   LTR opp arm OH    SIDELYING    PRONE    -press up = DN    Green foam roller to L glut, IT band, and hip flexor for 8 minutes    MANUAL THERAPY: leg distraction  Soft tissue massage to piriformis x 10 minutes      Not performed:  Isometric heel push into mat x 10 single each side   Core " "isometrics with orange ball 10 x 3" hold each direction  Deadbugs     Home Exercises Provided and Patient Education Provided     Education provided:   - tennis ball massage to piriformis at home   -continue ice/heat PRN   Written Home Exercises Provided: Patient instructed to cont prior HEP and add exercises from today's session.   Exercises were reviewed and Obi was able to demonstrate them prior to the end of the session.  Obi demonstrated good  understanding of the education provided.     See EMR under Media for exercises provided 5/20/2020.     Assessment     Patient tolerated  Therapy session fairly well. Patient with noted core and hip musculature weakness and will continue to benefit from skilled physical therapy to address deficits.   Obi is progressing well towards her goals.   Pt prognosis is Fair.     Pt will continue to benefit from skilled outpatient physical therapy to address the deficits listed in the problem list box on initial evaluation, provide pt/family education and to maximize pt's level of independence in the home and community environment.     Pt's spiritual, cultural and educational needs considered and pt agreeable to plan of care and goals.     Anticipated barriers to physical therapy: none    Goals:   Short Term GOALS: 5 weeks. Pt agrees with goals set.  1. Pts pain intensity decreased 20-40% for improved quality of life and functional mobilty  2. Pt to demonstrate core activation with spinal stability during transitional movements for improved quality of movement  3. Pt to experience 50% or more reduced interruption of sleep due to reduced pain for improved quality of life  4. Patient demonstrates independence with HEP for self management   5. Patient demonstrates independence with Postural Awareness for control of pain   6. Pt demonstrates active SLR to 70 degrees to improve functional mobility      Long Term GOALS: 10 weeks. Pt agrees with goals set.  1. Patient demonstrates increased " spine mobility in all planes of movement with 50% or greater reduction of pain to improve functional mobility and tolerance to functional activities.   2. Patient demonstrates increased BLE hip extension mobility to 10 degrees or greater to improve functional mobility   3. Patient demonstrates improved overall function and return demonstration to functional ADL and recreational lifestyle  4. Pt demonstrates  [place] with body mechanics to control episodes of pain associated with daily ADL and improve functional lifestyle.          Plan     Continue per plan of care     Candelaria Esparza, PTA

## 2020-06-15 ENCOUNTER — CLINICAL SUPPORT (OUTPATIENT)
Dept: REHABILITATION | Facility: HOSPITAL | Age: 40
End: 2020-06-15
Attending: PHYSICAL MEDICINE & REHABILITATION
Payer: COMMERCIAL

## 2020-06-15 DIAGNOSIS — R26.89 DECREASED FUNCTIONAL MOBILITY: ICD-10-CM

## 2020-06-15 DIAGNOSIS — M62.81 WEAKNESS OF TRUNK MUSCULATURE: ICD-10-CM

## 2020-06-15 DIAGNOSIS — R25.9 DYSFUNCTIONAL MOVEMENTS: ICD-10-CM

## 2020-06-15 DIAGNOSIS — R26.89 DECREASED BACK MOBILITY: ICD-10-CM

## 2020-06-15 DIAGNOSIS — M54.50 PAIN OF LUMBAR SPINE WITH MOVEMENT: ICD-10-CM

## 2020-06-15 PROCEDURE — 97110 THERAPEUTIC EXERCISES: CPT | Mod: PO | Performed by: PHYSICAL THERAPIST

## 2020-06-15 NOTE — PROGRESS NOTES
"  Physical Therapy Daily Treatment Note     Name: Yanna Kwong  Clinic Number: [id number]    Therapy Diagnosis:   Encounter Diagnoses   Name Primary?    Pain of lumbar spine with movement     Dysfunctional movements     Weakness of trunk musculature     Decreased back mobility     Decreased functional mobility      Physician: Shirin Krishna, *    Visit Date: 6/15/2020    Physician Orders: PT Eval and Treat back   Medical Diagnosis:  M54.42 (ICD-10-CM) - Acute left-sided low back pain with left-sided sciatica    G57.02 (ICD-10-CM) - Piriformis syndrome, left  Evaluation Date: 5/18/2020  Authorization Period Expiration: 12/31/2020  Plan of Care Certification Period: 8/18/2020  Visit #/Visits authorized: 8 / 20     Time In: 1745 pm    Time Out: 1840  pm   Total Billable Time: 55  minutes     Precautions: Standard    Subjective     Pt reports: the back is feeling better. There is a little something but not bad.   Had some tingling along the toes on the left and side of the foot.      She was compliant with her home exercise program this session.  Response to previous treatment: felt fine after last session  Functional change: slight improvement. Mobility is much better. Standing for longer periods of time.    Pain: 1 /10    Location: low back and buttocks     Objective     Obi received therapeutic exercises to develop strength, endurance, ROM, flexibility, posture and core stabilization for 55 minutes including:    Up right bike: 5  minutes    Supine:  Hamstring stretch 10" x 9   Calf stretch with band 3 x 30"  Figure 4 stretch 10" x 12   DKTC 10 x 10" hold   TA draw with pelvic tilts  X 15  with 5" hold   TA with heel slides  in table top position: x 20   TA ball squeeze 5" x 12   Bridges x 15  Single Hip abduction with OTB x 10   Hip adduction with ball 10 x 5" hold   LTR opp arm OH x12    SIDELYING  -Reverse clams x12   -clams 12    PRONE  -press up = DN    Green foam roller to L glut, IT band, " "and hip flexor for 8 minutes    MANUAL THERAPY: leg distraction  Soft tissue massage to piriformis x 10 minutes      Not performed:  Isometric heel push into mat x 10 single each side   Core isometrics with orange ball 10 x 3" hold each direction  Deadbugs     Home Exercises Provided and Patient Education Provided     Education provided:   - tennis ball massage to piriformis at home   -continue ice/heat PRN   Written Home Exercises Provided: Patient instructed to cont prior HEP and add exercises from today's session.   Exercises were reviewed and Obi was able to demonstrate them prior to the end of the session.  Obi demonstrated good  understanding of the education provided.     See EMR under Media for exercises provided 5/20/2020.     Assessment     The patient performed the activities noted.  She demonstrated good mobility and is progressing with activities directed to core strengthening and gluteal strengthening.   Obi is progressing well towards her goals.   Pt prognosis is Fair.     Pt will continue to benefit from skilled outpatient physical therapy to address the deficits listed in the problem list box on initial evaluation, provide pt/family education and to maximize pt's level of independence in the home and community environment.     Pt's spiritual, cultural and educational needs considered and pt agreeable to plan of care and goals.     Anticipated barriers to physical therapy: none    Goals:   Short Term GOALS: 5 weeks. Pt agrees with goals set.  1. Pts pain intensity decreased 20-40% for improved quality of life and functional mobilty  2. Pt to demonstrate core activation with spinal stability during transitional movements for improved quality of movement  3. Pt to experience 50% or more reduced interruption of sleep due to reduced pain for improved quality of life  4. Patient demonstrates independence with HEP for self management   5. Patient demonstrates independence with Postural Awareness for " control of pain   6. Pt demonstrates active SLR to 70 degrees to improve functional mobility      Long Term GOALS: 10 weeks. Pt agrees with goals set.  1. Patient demonstrates increased spine mobility in all planes of movement with 50% or greater reduction of pain to improve functional mobility and tolerance to functional activities.   2. Patient demonstrates increased BLE hip extension mobility to 10 degrees or greater to improve functional mobility   3. Patient demonstrates improved overall function and return demonstration to functional ADL and recreational lifestyle  4. Pt demonstrates  [place] with body mechanics to control episodes of pain associated with daily ADL and improve functional lifestyle.          Plan     Continue per plan of care     Jeffery Leiva, PT

## 2020-06-17 ENCOUNTER — CLINICAL SUPPORT (OUTPATIENT)
Dept: REHABILITATION | Facility: HOSPITAL | Age: 40
End: 2020-06-17
Attending: PHYSICAL MEDICINE & REHABILITATION
Payer: COMMERCIAL

## 2020-06-17 DIAGNOSIS — M54.50 PAIN OF LUMBAR SPINE WITH MOVEMENT: ICD-10-CM

## 2020-06-17 DIAGNOSIS — R25.9 DYSFUNCTIONAL MOVEMENTS: ICD-10-CM

## 2020-06-17 DIAGNOSIS — M62.81 WEAKNESS OF TRUNK MUSCULATURE: ICD-10-CM

## 2020-06-17 DIAGNOSIS — R26.89 DECREASED BACK MOBILITY: ICD-10-CM

## 2020-06-17 DIAGNOSIS — R26.89 DECREASED FUNCTIONAL MOBILITY: ICD-10-CM

## 2020-06-17 PROCEDURE — 97110 THERAPEUTIC EXERCISES: CPT | Mod: PO,CQ

## 2020-06-17 PROCEDURE — 97140 MANUAL THERAPY 1/> REGIONS: CPT | Mod: PO,CQ

## 2020-06-17 NOTE — PROGRESS NOTES
"  Physical Therapy Daily Treatment Note     Name: Yanna Kwong  Clinic Number: [id number]    Therapy Diagnosis:   Encounter Diagnoses   Name Primary?    Pain of lumbar spine with movement     Dysfunctional movements     Weakness of trunk musculature     Decreased back mobility     Decreased functional mobility      Physician: Shirin Krishna, *    Visit Date: 6/17/2020    Physician Orders: PT Eval and Treat back   Medical Diagnosis:  M54.42 (ICD-10-CM) - Acute left-sided low back pain with left-sided sciatica    G57.02 (ICD-10-CM) - Piriformis syndrome, left  Evaluation Date: 5/18/2020  Authorization Period Expiration: 12/31/2020  Plan of Care Certification Period: 8/18/2020  Visit #/Visits authorized: 9 / 20     Time In: 04:20  pm    Time Out: 05:23 pm   Total Billable Time: 55  minutes     Precautions: Standard    Subjective     Pt reports: overall she is feeling much better      She was compliant with her home exercise program this session.  Response to previous treatment: felt fine after last session  Functional change: slight improvement. Mobility is much better. Standing for longer periods of time.    Pain: 1 /10    Location: low back and buttocks     Objective     Obi received therapeutic exercises to develop strength, endurance, ROM, flexibility, posture and core stabilization for 45 minutes including:    Upright bike: 8  minutes    Supine:  Hamstring stretch 10" x 9   Calf stretch with band 3 x 30"  Figure 4 stretch 10" x 12   DKTC 10 x 10" hold   TA draw with pelvic tilts  X 15  with 5" hold   TA with heel slides  in table top position: x 20   TA ball squeeze 5" x 12   Bridges x 15  Single Hip abduction with OTB x 10   Hip adduction with ball 10 x 5" hold   LTR opp arm OH x12    SIDELYING  -Reverse clams x12   -clams 12    PRONE  -press up = DN    Green foam roller to L glut, IT band, and hip flexor for 8 minutes    MANUAL THERAPY: leg distraction  Soft tissue massage to piriformis x 10 " "minutes      Not performed:  Isometric heel push into mat x 10 single each side   Core isometrics with orange ball 10 x 3" hold each direction  Deadbugs     Home Exercises Provided and Patient Education Provided     Education provided:   - tennis ball massage to piriformis at home   -continue ice/heat PRN   Written Home Exercises Provided: Patient instructed to cont prior HEP and add exercises from today's session.   Exercises were reviewed and Obi was able to demonstrate them prior to the end of the session.  Obi demonstrated good  understanding of the education provided.     See EMR under Media for exercises provided 5/20/2020.     Assessment     The patient performed the activities noted.  She demonstrated good mobility and is progressing with activities directed to core strengthening and gluteal strengthening.   Obi is progressing well towards her goals.   Pt prognosis is Fair.     Pt will continue to benefit from skilled outpatient physical therapy to address the deficits listed in the problem list box on initial evaluation, provide pt/family education and to maximize pt's level of independence in the home and community environment.     Pt's spiritual, cultural and educational needs considered and pt agreeable to plan of care and goals.     Anticipated barriers to physical therapy: none    Goals:   Short Term GOALS: 5 weeks. Pt agrees with goals set.  1. Pts pain intensity decreased 20-40% for improved quality of life and functional mobilty  2. Pt to demonstrate core activation with spinal stability during transitional movements for improved quality of movement  3. Pt to experience 50% or more reduced interruption of sleep due to reduced pain for improved quality of life  4. Patient demonstrates independence with HEP for self management   5. Patient demonstrates independence with Postural Awareness for control of pain   6. Pt demonstrates active SLR to 70 degrees to improve functional mobility      Long Term " GOALS: 10 weeks. Pt agrees with goals set.  1. Patient demonstrates increased spine mobility in all planes of movement with 50% or greater reduction of pain to improve functional mobility and tolerance to functional activities.   2. Patient demonstrates increased BLE hip extension mobility to 10 degrees or greater to improve functional mobility   3. Patient demonstrates improved overall function and return demonstration to functional ADL and recreational lifestyle  4. Pt demonstrates  [place] with body mechanics to control episodes of pain associated with daily ADL and improve functional lifestyle.          Plan     Continue per plan of care     Candelaria Esparza, PTA

## 2020-06-20 DIAGNOSIS — Z01.84 ANTIBODY RESPONSE EXAMINATION: ICD-10-CM

## 2020-06-22 ENCOUNTER — CLINICAL SUPPORT (OUTPATIENT)
Dept: REHABILITATION | Facility: HOSPITAL | Age: 40
End: 2020-06-22
Attending: PHYSICAL MEDICINE & REHABILITATION
Payer: COMMERCIAL

## 2020-06-22 DIAGNOSIS — R25.9 DYSFUNCTIONAL MOVEMENTS: ICD-10-CM

## 2020-06-22 DIAGNOSIS — R26.89 DECREASED BACK MOBILITY: ICD-10-CM

## 2020-06-22 DIAGNOSIS — R26.89 DECREASED FUNCTIONAL MOBILITY: ICD-10-CM

## 2020-06-22 DIAGNOSIS — M62.81 WEAKNESS OF TRUNK MUSCULATURE: ICD-10-CM

## 2020-06-22 DIAGNOSIS — M54.50 PAIN OF LUMBAR SPINE WITH MOVEMENT: ICD-10-CM

## 2020-06-22 PROCEDURE — 97110 THERAPEUTIC EXERCISES: CPT | Mod: PO | Performed by: PHYSICAL THERAPIST

## 2020-06-22 NOTE — PROGRESS NOTES
"  Physical Therapy Daily Treatment Note     Name: Yanna Kwong  Clinic Number: [id number]    Therapy Diagnosis:   Encounter Diagnoses   Name Primary?    Pain of lumbar spine with movement     Dysfunctional movements     Weakness of trunk musculature     Decreased back mobility     Decreased functional mobility      Physician: Shirin Krishna, *    Visit Date: 6/22/2020    Physician Orders: PT Eval and Treat back   Medical Diagnosis:  M54.42 (ICD-10-CM) - Acute left-sided low back pain with left-sided sciatica    G57.02 (ICD-10-CM) - Piriformis syndrome, left  Evaluation Date: 5/18/2020  Authorization Period Expiration: 12/31/2020  Plan of Care Certification Period: 8/18/2020  Visit #/Visits authorized: 10 / 20     Time In: 1740 pm    Time Out: 1835 pm   Total Billable Time: 55  minutes     Precautions: Standard    Subjective     Pt reports: feeling a little better today. More feeling in my foot and toes.      She was fairly compliant with her home exercise program. Did a lot of studying and able to do some.   Response to previous treatment: did fine. Maybe a little soreness or tenderness.       Functional change: can lift the leg better. Doing better with standing for longer periods of time and sitting surfaces. Improving .     Pain: 2 /10    Location: low back and buttocks      Objective     Obi received therapeutic exercises to develop strength, endurance, ROM, flexibility, posture and core stabilization for 55 minutes including:    Up right bike: 6   minutes    Supine:  Hamstring stretch 10" x 9   Calf stretch with band 3 x 30"  Figure 4 stretch 10" x 12   DKTC 10 x 10" hold   TA draw with pelvic tilts  X 15  with 5" hold   TA with heel slides  in table top position: x 20   TA ball squeeze 5" x 12   Bridges x 15  Single Hip abduction with OTB x 10   Hip adduction with ball 10 x 5" hold   LTR opp arm OH x12  Rolling - log rolling x5ea     SIDELYING  -clams / Reverse clams x12 " "OTB    PRONE  -press up = DN    Green foam roller to L glut, IT band, and hip flexor for 8 minutes    MANUAL THERAPY: leg distraction  Soft tissue massage to piriformis x 10 minutes      Not performed:  Isometric heel push into mat x 10 single each side   Core isometrics with orange ball 10 x 3" hold each direction  Deadbugs     Home Exercises Provided and Patient Education Provided     Education provided:   - tennis ball massage to piriformis at home   -continue ice/heat PRN   Written Home Exercises Provided: Patient instructed to cont prior HEP and add exercises from today's session.   Exercises were reviewed and Obi was able to demonstrate them prior to the end of the session.  Obi demonstrated good  understanding of the education provided.     See EMR under Media for exercises provided 5/20/2020.     Assessment     Routine performed. Completed with some residual soreness in the back however no significant increase in her pain. Demonstrated weak or dysfunctional rolling patterns into flexion. Will progress with core and trunk strengthening.     Obi is progressing well towards her goals.   Pt prognosis is Fair.     Pt will continue to benefit from skilled outpatient physical therapy to address the deficits listed in the problem list box on initial evaluation, provide pt/family education and to maximize pt's level of independence in the home and community environment.     Pt's spiritual, cultural and educational needs considered and pt agreeable to plan of care and goals.     Anticipated barriers to physical therapy: none    Goals:   Short Term GOALS: 5 weeks. Pt agrees with goals set.  1. Pts pain intensity decreased 20-40% for improved quality of life and functional mobilty  2. Pt to demonstrate core activation with spinal stability during transitional movements for improved quality of movement  3. Pt to experience 50% or more reduced interruption of sleep due to reduced pain for improved quality of life  4. " Patient demonstrates independence with HEP for self management   5. Patient demonstrates independence with Postural Awareness for control of pain   6. Pt demonstrates active SLR to 70 degrees to improve functional mobility      Long Term GOALS: 10 weeks. Pt agrees with goals set.  1. Patient demonstrates increased spine mobility in all planes of movement with 50% or greater reduction of pain to improve functional mobility and tolerance to functional activities.   2. Patient demonstrates increased BLE hip extension mobility to 10 degrees or greater to improve functional mobility   3. Patient demonstrates improved overall function and return demonstration to functional ADL and recreational lifestyle  4. Pt demonstrates  [place] with body mechanics to control episodes of pain associated with daily ADL and improve functional lifestyle.          Plan     Continue per plan of care     Jeffery Leiva, PT

## 2020-06-24 ENCOUNTER — CLINICAL SUPPORT (OUTPATIENT)
Dept: REHABILITATION | Facility: HOSPITAL | Age: 40
End: 2020-06-24
Attending: PHYSICAL MEDICINE & REHABILITATION
Payer: COMMERCIAL

## 2020-06-24 DIAGNOSIS — R26.89 DECREASED BACK MOBILITY: ICD-10-CM

## 2020-06-24 DIAGNOSIS — R25.9 DYSFUNCTIONAL MOVEMENTS: ICD-10-CM

## 2020-06-24 DIAGNOSIS — M54.50 PAIN OF LUMBAR SPINE WITH MOVEMENT: ICD-10-CM

## 2020-06-24 DIAGNOSIS — R26.89 DECREASED FUNCTIONAL MOBILITY: ICD-10-CM

## 2020-06-24 DIAGNOSIS — M62.81 WEAKNESS OF TRUNK MUSCULATURE: ICD-10-CM

## 2020-06-24 PROCEDURE — 97110 THERAPEUTIC EXERCISES: CPT | Mod: PO,CQ

## 2020-06-24 NOTE — PROGRESS NOTES
"  Physical Therapy Daily Treatment Note     Name: Yanna Kwong  Clinic Number: [id number]    Therapy Diagnosis:   Encounter Diagnoses   Name Primary?    Pain of lumbar spine with movement     Dysfunctional movements     Weakness of trunk musculature     Decreased back mobility     Decreased functional mobility      Physician: Shirin Krishna, *    Visit Date: 6/24/2020    Physician Orders: PT Eval and Treat back   Medical Diagnosis:  M54.42 (ICD-10-CM) - Acute left-sided low back pain with left-sided sciatica    G57.02 (ICD-10-CM) - Piriformis syndrome, left  Evaluation Date: 5/18/2020  Authorization Period Expiration: 12/31/2020  Plan of Care Certification Period: 8/18/2020  Visit #/Visits authorized: 11 / 20     Time In:   05:00 pm   Time Out: 05:45  pm   Total Billable Time: 45  minutes     Precautions: Standard    Subjective     Pt reports: improvements overall       She was fairly compliant with her home exercise program. Did a lot of studying and able to do some.   Response to previous treatment: did fine. Maybe a little soreness or tenderness.       Functional change: can lift the leg better. Doing better with standing for longer periods of time and sitting surfaces. Improving .     Pain: 2 /10    Location: low back and buttocks      Objective     Obi received therapeutic exercises to develop strength, endurance, ROM, flexibility, posture and core stabilization for 55 minutes including:    Upright bike: 7 minutes    Supine:  Hamstring stretch 10" x 9   Calf stretch with band 3 x 30"  Figure 4 stretch 10" x 12   DKTC 10 x 10" hold   TA draw with pelvic tilts  X 15  with 5" hold   TA with heel slides  in table top position: x 20   TA ball squeeze 5" x 12   Bridges x 15  Single Hip abduction with OTB x 10   Hip adduction with ball 10 x 5" hold   LTR opp arm OH x12  Rolling - log rolling x5 each   Double leg shuttle: 3 x 10 # 3 bands     SIDELYING  -clams / Reverse clams x12 " "OTB    PRONE  -press up = DN    Green foam roller to L glut, IT band, and hip flexor for 8 minutes    MANUAL THERAPY: leg distraction  Soft tissue massage to piriformis x 10 minutes      Not performed:  Isometric heel push into mat x 10 single each side   Core isometrics with orange ball 10 x 3" hold each direction  Deadbugs     Home Exercises Provided and Patient Education Provided     Education provided:   - tennis ball massage to piriformis at home   -continue ice/heat PRN   Written Home Exercises Provided: Patient instructed to cont prior HEP and add exercises from today's session.   Exercises were reviewed and Obi was able to demonstrate them prior to the end of the session.  Obi demonstrated good  understanding of the education provided.     See EMR under Media for exercises provided 5/20/2020.     Assessment     Patient tolerated therapy session well. Patient with noted TTP and tightness of glut med with some improvements following manual techniques.     Obi is progressing well towards her goals.   Pt prognosis is Fair.     Pt will continue to benefit from skilled outpatient physical therapy to address the deficits listed in the problem list box on initial evaluation, provide pt/family education and to maximize pt's level of independence in the home and community environment.     Pt's spiritual, cultural and educational needs considered and pt agreeable to plan of care and goals.     Anticipated barriers to physical therapy: none    Goals:   Short Term GOALS: 5 weeks. Pt agrees with goals set.  1. Pts pain intensity decreased 20-40% for improved quality of life and functional mobilty  2. Pt to demonstrate core activation with spinal stability during transitional movements for improved quality of movement  3. Pt to experience 50% or more reduced interruption of sleep due to reduced pain for improved quality of life  4. Patient demonstrates independence with HEP for self management   5. Patient demonstrates " independence with Postural Awareness for control of pain   6. Pt demonstrates active SLR to 70 degrees to improve functional mobility      Long Term GOALS: 10 weeks. Pt agrees with goals set.  1. Patient demonstrates increased spine mobility in all planes of movement with 50% or greater reduction of pain to improve functional mobility and tolerance to functional activities.   2. Patient demonstrates increased BLE hip extension mobility to 10 degrees or greater to improve functional mobility   3. Patient demonstrates improved overall function and return demonstration to functional ADL and recreational lifestyle  4. Pt demonstrates  [place] with body mechanics to control episodes of pain associated with daily ADL and improve functional lifestyle.          Plan     Continue per plan of care     Candelaria Esparza, PTA

## 2020-07-06 ENCOUNTER — CLINICAL SUPPORT (OUTPATIENT)
Dept: REHABILITATION | Facility: HOSPITAL | Age: 40
End: 2020-07-06
Attending: PHYSICAL MEDICINE & REHABILITATION
Payer: COMMERCIAL

## 2020-07-06 DIAGNOSIS — R26.89 DECREASED FUNCTIONAL MOBILITY: ICD-10-CM

## 2020-07-06 DIAGNOSIS — R25.9 DYSFUNCTIONAL MOVEMENTS: ICD-10-CM

## 2020-07-06 DIAGNOSIS — M62.81 WEAKNESS OF TRUNK MUSCULATURE: ICD-10-CM

## 2020-07-06 DIAGNOSIS — R26.89 DECREASED BACK MOBILITY: ICD-10-CM

## 2020-07-06 DIAGNOSIS — M54.50 PAIN OF LUMBAR SPINE WITH MOVEMENT: ICD-10-CM

## 2020-07-06 PROCEDURE — 97110 THERAPEUTIC EXERCISES: CPT | Mod: PO | Performed by: PHYSICAL THERAPIST

## 2020-07-06 NOTE — PROGRESS NOTES
"  Physical Therapy Daily Treatment Note     Name: Yanna Kwong  Clinic Number: [id number]    Therapy Diagnosis:   Encounter Diagnoses   Name Primary?    Pain of lumbar spine with movement     Dysfunctional movements     Weakness of trunk musculature     Decreased back mobility     Decreased functional mobility      Physician: Shirin Krishna, *    Visit Date: 7/6/2020    Physician Orders: PT Eval and Treat back   Medical Diagnosis:  M54.42 (ICD-10-CM) - Acute left-sided low back pain with left-sided sciatica    G57.02 (ICD-10-CM) - Piriformis syndrome, left  Evaluation Date: 5/18/2020  Authorization Period Expiration: 12/31/2020  Plan of Care Certification Period: 8/18/2020  Visit #/Visits authorized: 12 / 20     Time In:  1745 pm   Time Out: 1845  pm   Total Billable Time: 60  minutes     Precautions: Standard    Subjective     Pt reports: feeling pretty good and getting more feeling on the bottom of my foot. Pain located in the left buttocks is very slight. It comes and goes. Not there if sitting and only there standing.    She was fairly compliant with her home exercise program. Did a lot of studying and able to do some.   Response to previous treatment: did fine. Maybe a little soreness or tenderness.   Functional change: can lift the leg better. Doing better with standing for longer periods of time and sitting surfaces. Improving .     Pain: 1 /10    Location:  buttocks      Objective     Obi received therapeutic exercises to develop strength, endurance, ROM, flexibility, posture and core stabilization for 60 minutes including:    Upper extremity  bike: 6 minutes    Supine:  Hamstring stretch 10" x 9   Calf stretch with band 3 x 30"  Figure 4 stretch 10" x 9   DKTC 10 x 10" hold   TA draw with pelvic tilts  X 15  with 5" hold   TA with heel slides  in table top position: x 20   TA ball squeeze 5" x 12   Bridges x 15  Single Hip abduction with OTB x 10   Hip adduction with ball 10 x 5" hold " "  LTR opp arm OH x12  Rolling - log rolling x5 each   Double leg shuttle: 3 x 10 # 3 bands     SIDELYING  -clams / Reverse clams x12 YB    PRONE  -press up = DN   SEATED  -Hamstring stretch 10" x 9   Green foam roller to L glut, IT band, and hip flexor for 8 minutes    MANUAL THERAPY: leg distraction  Soft tissue massage to piriformis x 10 minutes      Not performed:  Isometric heel push into mat x 10 single each side   Core isometrics with orange ball 10 x 3" hold each direction  Deadbugs     Home Exercises Provided and Patient Education Provided     Education provided:   - tennis ball massage to piriformis at home   -continue ice/heat PRN   Written Home Exercises Provided: Patient instructed to cont prior HEP and add exercises from today's session.   Exercises were reviewed and Obi was able to demonstrate them prior to the end of the session.  Obi demonstrated good  understanding of the education provided.     See EMR under Media for exercises provided 5/20/2020.     Assessment     Activities completed as noted. She did not demonstrate signs of pain. Exercises performed w/o significant verbal cueing. Overall good response. Progressing as far as pain response .   Obi is progressing well towards her goals.   Pt prognosis is Fair.     Pt will continue to benefit from skilled outpatient physical therapy to address the deficits listed in the problem list box on initial evaluation, provide pt/family education and to maximize pt's level of independence in the home and community environment.     Pt's spiritual, cultural and educational needs considered and pt agreeable to plan of care and goals.     Anticipated barriers to physical therapy: none    Goals:   Short Term GOALS: 5 weeks. Pt agrees with goals set.  1. Pts pain intensity decreased 20-40% for improved quality of life and functional mobilty  2. Pt to demonstrate core activation with spinal stability during transitional movements for improved quality of " movement  3. Pt to experience 50% or more reduced interruption of sleep due to reduced pain for improved quality of life  4. Patient demonstrates independence with HEP for self management   5. Patient demonstrates independence with Postural Awareness for control of pain   6. Pt demonstrates active SLR to 70 degrees to improve functional mobility      Long Term GOALS: 10 weeks. Pt agrees with goals set.  1. Patient demonstrates increased spine mobility in all planes of movement with 50% or greater reduction of pain to improve functional mobility and tolerance to functional activities.   2. Patient demonstrates increased BLE hip extension mobility to 10 degrees or greater to improve functional mobility   3. Patient demonstrates improved overall function and return demonstration to functional ADL and recreational lifestyle  4. Pt demonstrates  [place] with body mechanics to control episodes of pain associated with daily ADL and improve functional lifestyle.          Plan     Continue per plan of care     Jeffery Leiva, PT

## 2020-07-09 ENCOUNTER — CLINICAL SUPPORT (OUTPATIENT)
Dept: REHABILITATION | Facility: HOSPITAL | Age: 40
End: 2020-07-09
Attending: PHYSICAL MEDICINE & REHABILITATION
Payer: COMMERCIAL

## 2020-07-09 DIAGNOSIS — R26.89 DECREASED FUNCTIONAL MOBILITY: ICD-10-CM

## 2020-07-09 DIAGNOSIS — M54.50 PAIN OF LUMBAR SPINE WITH MOVEMENT: ICD-10-CM

## 2020-07-09 DIAGNOSIS — M62.81 WEAKNESS OF TRUNK MUSCULATURE: ICD-10-CM

## 2020-07-09 DIAGNOSIS — R26.89 DECREASED BACK MOBILITY: ICD-10-CM

## 2020-07-09 DIAGNOSIS — R25.9 DYSFUNCTIONAL MOVEMENTS: ICD-10-CM

## 2020-07-09 PROCEDURE — 97140 MANUAL THERAPY 1/> REGIONS: CPT | Mod: PO,CQ

## 2020-07-09 PROCEDURE — 97110 THERAPEUTIC EXERCISES: CPT | Mod: PO,CQ

## 2020-07-09 NOTE — PROGRESS NOTES
"  Physical Therapy Daily Treatment Note     Name: Yanna Kwong  Clinic Number: [id number]    Therapy Diagnosis:   Encounter Diagnoses   Name Primary?    Pain of lumbar spine with movement     Dysfunctional movements     Weakness of trunk musculature     Decreased back mobility     Decreased functional mobility      Physician: Shirin Krishna, *    Visit Date: 7/9/2020    Physician Orders: PT Eval and Treat back   Medical Diagnosis:  M54.42 (ICD-10-CM) - Acute left-sided low back pain with left-sided sciatica    G57.02 (ICD-10-CM) - Piriformis syndrome, left  Evaluation Date: 5/18/2020  Authorization Period Expiration: 12/31/2020  Plan of Care Certification Period: 8/18/2020  Visit #/Visits authorized: 13 / 20     Time In:  1745 pm   Time Out: 1830  pm   Total Billable Time:   minutes     Precautions: Standard    Subjective     Pt reports: feeling better overall has feeling back in her feet and toes    She was fairly compliant with her home exercise program. Did a lot of studying and able to do some.   Response to previous treatment: did fine. Maybe a little soreness or tenderness.   Functional change: can lift the leg better. Doing better with standing for longer periods of time and sitting surfaces. Improving .     Pain: 1 /10    Location:  buttocks      Objective     Obi received therapeutic exercises to develop strength, endurance, ROM, flexibility, posture and core stabilization for 45 minutes including:    Upper extremity  bike: 6 minutes    Supine:  Hamstring stretch 10" x 9   Calf stretch with band 3 x 30"  Figure 4 stretch 10" x 9   DKTC 10 x 10" hold   TA draw with pelvic tilts  X 15  with 5" hold   TA with heel slides  in table top position: x 20   TA ball squeeze 5" x 12   Bridges x 15  Single Hip abduction with OTB x 10   Hip adduction with ball 10 x 5" hold   LTR opp arm OH x12  Rolling - log rolling x5 each   Double leg shuttle: 3 x 10 # 3 bands     SIDELYING  -clams / Reverse clams " "x12 YB    PRONE  -press up = DN   SEATED  -Hamstring stretch 10" x 9   Green foam roller to L glut, IT band, and hip flexor for 8 minutes    MANUAL THERAPY: leg distraction  Soft tissue massage to piriformis x 10 minutes      Not performed:  Isometric heel push into mat x 10 single each side   Core isometrics with orange ball 10 x 3" hold each direction  Deadbugs     Home Exercises Provided and Patient Education Provided     Education provided:   - tennis ball massage to piriformis at home   -continue ice/heat PRN   Written Home Exercises Provided: Patient instructed to cont prior HEP and add exercises from today's session.   Exercises were reviewed and Obi was able to demonstrate them prior to the end of the session.  Obi demonstrated good  understanding of the education provided.     See EMR under Media for exercises provided 5/20/2020.     Assessment     Patient tolerated therapy session well, no increase in pain or adverse symptoms. Patient will continue to benefit from skilled physical therapy for continued core strengthening to aid in decreasing pain.    Obi is progressing well towards her goals.   Pt prognosis is Fair.     Pt will continue to benefit from skilled outpatient physical therapy to address the deficits listed in the problem list box on initial evaluation, provide pt/family education and to maximize pt's level of independence in the home and community environment.     Pt's spiritual, cultural and educational needs considered and pt agreeable to plan of care and goals.     Anticipated barriers to physical therapy: none    Goals:   Short Term GOALS: 5 weeks. Pt agrees with goals set.  1. Pts pain intensity decreased 20-40% for improved quality of life and functional mobilty  2. Pt to demonstrate core activation with spinal stability during transitional movements for improved quality of movement  3. Pt to experience 50% or more reduced interruption of sleep due to reduced pain for improved quality of " life  4. Patient demonstrates independence with HEP for self management   5. Patient demonstrates independence with Postural Awareness for control of pain   6. Pt demonstrates active SLR to 70 degrees to improve functional mobility      Long Term GOALS: 10 weeks. Pt agrees with goals set.  1. Patient demonstrates increased spine mobility in all planes of movement with 50% or greater reduction of pain to improve functional mobility and tolerance to functional activities.   2. Patient demonstrates increased BLE hip extension mobility to 10 degrees or greater to improve functional mobility   3. Patient demonstrates improved overall function and return demonstration to functional ADL and recreational lifestyle  4. Pt demonstrates  [place] with body mechanics to control episodes of pain associated with daily ADL and improve functional lifestyle.          Plan     Continue per plan of care     Candelaria Esparza, PTA

## 2020-07-14 ENCOUNTER — CLINICAL SUPPORT (OUTPATIENT)
Dept: REHABILITATION | Facility: HOSPITAL | Age: 40
End: 2020-07-14
Attending: PHYSICAL MEDICINE & REHABILITATION
Payer: COMMERCIAL

## 2020-07-14 DIAGNOSIS — M62.81 WEAKNESS OF TRUNK MUSCULATURE: ICD-10-CM

## 2020-07-14 DIAGNOSIS — R26.89 DECREASED FUNCTIONAL MOBILITY: ICD-10-CM

## 2020-07-14 DIAGNOSIS — M54.50 PAIN OF LUMBAR SPINE WITH MOVEMENT: ICD-10-CM

## 2020-07-14 DIAGNOSIS — R26.89 DECREASED BACK MOBILITY: ICD-10-CM

## 2020-07-14 DIAGNOSIS — R25.9 DYSFUNCTIONAL MOVEMENTS: ICD-10-CM

## 2020-07-14 PROCEDURE — 97110 THERAPEUTIC EXERCISES: CPT | Mod: PO | Performed by: PHYSICAL THERAPIST

## 2020-07-14 NOTE — PROGRESS NOTES
"  Physical Therapy Daily Treatment Note     Name: Yanna Kwong  Clinic Number: [id number]    Therapy Diagnosis:   Encounter Diagnoses   Name Primary?    Pain of lumbar spine with movement     Dysfunctional movements     Weakness of trunk musculature     Decreased back mobility     Decreased functional mobility      Physician: Shirin Krishna, *    Visit Date: 7/14/2020    Physician Orders: PT Eval and Treat back   Medical Diagnosis:  M54.42 (ICD-10-CM) - Acute left-sided low back pain with left-sided sciatica    G57.02 (ICD-10-CM) - Piriformis syndrome, left  Evaluation Date: 5/18/2020  Authorization Period Expiration: 12/31/2020  Plan of Care Certification Period: 8/18/2020  Visit #/Visits authorized: 13 / 20     Time In:  1655 pm   Time Out: 1755  pm   Total Billable Time: 60  minutes     Precautions: Standard    Subjective     Pt reports: she is feeling good. There is feeling good with very minimal pain. Did well over the weekend.        She was fairly compliant with her home exercise program. Did a lot of studying and able to do some.   Response to previous treatment: Felt good. No pain afterwards.     Functional change: Overall improved standing tolerance and can sit more comfortably and walk longer.  Sleeping better.     Pain: .5 -1 /10     Location:  buttocks      Objective     Obi received therapeutic exercises to develop strength, endurance, ROM, flexibility, posture and core stabilization for 60 minutes including:    Upper extremity  bike: 6 minutes    Supine:  Hamstring stretch 10" x 9   Calf stretch with band 3 x 30"  Figure 4 stretch 10" x 9   DKTC 15 x 5" hold   TA draw with pelvic tilts  X 15  with 5" hold   TA with heel slides  in table top position: x 20   TA ball squeeze 5" x 12   TA ball push hold 5" x 12   Bridges x 15  Single Hip abduction with OTB x 10   Hip adduction with ball 10 x 5" hold   LTR opp arm OH x12  Rolling - log rolling x10 each   Double leg shuttle: 3 x 10 # 3 " "bands     SIDELYING  -clams / Reverse clams x12 YB    PRONE  -press up = DN 3x10      SEATED  -Hamstring stretch 10" x 9   Green foam roller to L glut, IT band, and hip flexor for 8 minutes    MANUAL THERAPY: leg distraction  Soft tissue massage to piriformis x 10 minutes      Not performed:  Isometric heel push into mat x 10 single each side   Core isometrics with orange ball 10 x 3" hold each direction  Deadbugs     Home Exercises Provided and Patient Education Provided     Education provided:   - tennis ball massage to piriformis at home   -continue ice/heat PRN   Written Home Exercises Provided: Patient instructed to cont prior HEP and add exercises from today's session.   Exercises were reviewed and Obi was able to demonstrate them prior to the end of the session.  Obi demonstrated good  understanding of the education provided.     See EMR under Media for exercises provided 5/20/2020.     Assessment     Routine performed as noted. She dis not exhibit signs of pain. Core weakness evident with rolling. Progressing with core and trunk strengthening.   Obi is progressing well towards her goals.   Pt prognosis is Fair.     Pt will continue to benefit from skilled outpatient physical therapy to address the deficits listed in the problem list box on initial evaluation, provide pt/family education and to maximize pt's level of independence in the home and community environment.     Pt's spiritual, cultural and educational needs considered and pt agreeable to plan of care and goals.     Anticipated barriers to physical therapy: none    Goals:   Short Term GOALS: 5 weeks. Pt agrees with goals set.  1. Pts pain intensity decreased 20-40% for improved quality of life and functional mobilty  2. Pt to demonstrate core activation with spinal stability during transitional movements for improved quality of movement  3. Pt to experience 50% or more reduced interruption of sleep due to reduced pain for improved quality of " life  4. Patient demonstrates independence with HEP for self management   5. Patient demonstrates independence with Postural Awareness for control of pain   6. Pt demonstrates active SLR to 70 degrees to improve functional mobility      Long Term GOALS: 10 weeks. Pt agrees with goals set.  1. Patient demonstrates increased spine mobility in all planes of movement with 50% or greater reduction of pain to improve functional mobility and tolerance to functional activities.   2. Patient demonstrates increased BLE hip extension mobility to 10 degrees or greater to improve functional mobility   3. Patient demonstrates improved overall function and return demonstration to functional ADL and recreational lifestyle  4. Pt demonstrates  [place] with body mechanics to control episodes of pain associated with daily ADL and improve functional lifestyle.          Plan     Continue per plan of care     Jeffery Leiva, PT

## 2020-07-20 ENCOUNTER — CLINICAL SUPPORT (OUTPATIENT)
Dept: REHABILITATION | Facility: HOSPITAL | Age: 40
End: 2020-07-20
Attending: PHYSICAL MEDICINE & REHABILITATION
Payer: COMMERCIAL

## 2020-07-20 DIAGNOSIS — M62.81 WEAKNESS OF TRUNK MUSCULATURE: ICD-10-CM

## 2020-07-20 DIAGNOSIS — R25.9 DYSFUNCTIONAL MOVEMENTS: ICD-10-CM

## 2020-07-20 DIAGNOSIS — M54.50 PAIN OF LUMBAR SPINE WITH MOVEMENT: ICD-10-CM

## 2020-07-20 DIAGNOSIS — Z01.84 ANTIBODY RESPONSE EXAMINATION: ICD-10-CM

## 2020-07-20 DIAGNOSIS — R26.89 DECREASED FUNCTIONAL MOBILITY: ICD-10-CM

## 2020-07-20 DIAGNOSIS — R26.89 DECREASED BACK MOBILITY: ICD-10-CM

## 2020-07-20 PROCEDURE — 97110 THERAPEUTIC EXERCISES: CPT | Mod: PO | Performed by: PHYSICAL THERAPIST

## 2020-07-20 NOTE — PROGRESS NOTES
"  Physical Therapy Daily Treatment Note     Name: Yanna Kwong  Clinic Number: [id number]    Therapy Diagnosis:   Encounter Diagnoses   Name Primary?    Pain of lumbar spine with movement     Dysfunctional movements     Weakness of trunk musculature     Decreased back mobility     Decreased functional mobility      Physician: Shirin Krishna, *    Visit Date: 7/20/2020    Physician Orders: PT Eval and Treat back   Medical Diagnosis:  M54.42 (ICD-10-CM) - Acute left-sided low back pain with left-sided sciatica    G57.02 (ICD-10-CM) - Piriformis syndrome, left  Evaluation Date: 5/18/2020  Authorization Period Expiration: 12/31/2020  Plan of Care Certification Period: 8/18/2020  Visit #/Visits authorized: 13 / 20     Time In:  1755 pm   Time Out: 1855  pm   Total Billable Time: 60  minutes     Precautions: Standard    Subjective     Pt reports: the back is OK. Says she is noticing some tightness in the lower left part of the back just above the buttocks on the left.        She was fairly compliant with her home exercise program. Did a lot of studying and able to do some.   Response to previous treatment: Felt good. No pain afterwards.     Functional change: Overall improved standing tolerance and can sit more comfortably and walk longer.  Sleeping better.     Pain: 0 /10     Location:  buttocks      Objective     Obi received therapeutic exercises to develop strength, endurance, ROM, flexibility, posture and core stabilization for 60 minutes including:    Upper extremity  bike: 6 minutes    Supine:  Hamstring stretch 10" x 9   Calf stretch with band 3 x 30"  Figure 4 stretch 10" x 9   DKTC 15 x 5" hold   TA draw with pelvic tilts  X 15  with 5" hold   TA with heel slides  in table top position: x 20   TA ball squeeze 5" x 12   TA ball push hold 5" x 12   Bridges x 15  Single Hip abduction with OTB x 10   Hip adduction with ball 10 x 5" hold   LTR opp arm OH x12  Rolling - log rolling x10 each " "  Double leg shuttle: 3 x 10 # 3 bands     SIDELYING  -clams / Reverse clams x12 YB  -open book x12    PRONE  -press up = DN 3x10    -trunk extension     QUADRUPED     SEATED  -Hamstring stretch 10" x 9   Green foam roller to L glut, IT band, and hip flexor for 8 minutes    MANUAL THERAPY: leg distraction  Soft tissue massage to piriformis x 10 minutes      Not performed:  Isometric heel push into mat x 10 single each side   Core isometrics with orange ball 10 x 3" hold each direction  Deadbugs     Home Exercises Provided and Patient Education Provided     Education provided:   - tennis ball massage to piriformis at home   -continue ice/heat PRN   Written Home Exercises Provided: Patient instructed to cont prior HEP and add exercises from today's session.   Exercises were reviewed and Obi was able to demonstrate them prior to the end of the session.  Obi demonstrated good  understanding of the education provided.     See EMR under Media for exercises provided 5/20/2020.     Assessment     Patient performed and completed the activities noted. She is not limited with the exercises performed. Overall she is progressing well. Will progress with hip strengthening and back extensor strengthening.     Obi is progressing well towards her goals.   Pt prognosis is Fair.     Pt will continue to benefit from skilled outpatient physical therapy to address the deficits listed in the problem list box on initial evaluation, provide pt/family education and to maximize pt's level of independence in the home and community environment.     Pt's spiritual, cultural and educational needs considered and pt agreeable to plan of care and goals.     Anticipated barriers to physical therapy: none    Goals:   Short Term GOALS: 5 weeks. Pt agrees with goals set.  1. Pts pain intensity decreased 20-40% for improved quality of life and functional mobilty  2. Pt to demonstrate core activation with spinal stability during transitional movements for " improved quality of movement  3. Pt to experience 50% or more reduced interruption of sleep due to reduced pain for improved quality of life  4. Patient demonstrates independence with HEP for self management   5. Patient demonstrates independence with Postural Awareness for control of pain   6. Pt demonstrates active SLR to 70 degrees to improve functional mobility      Long Term GOALS: 10 weeks. Pt agrees with goals set.  1. Patient demonstrates increased spine mobility in all planes of movement with 50% or greater reduction of pain to improve functional mobility and tolerance to functional activities.   2. Patient demonstrates increased BLE hip extension mobility to 10 degrees or greater to improve functional mobility   3. Patient demonstrates improved overall function and return demonstration to functional ADL and recreational lifestyle  4. Pt demonstrates  [place] with body mechanics to control episodes of pain associated with daily ADL and improve functional lifestyle.          Plan     Continue per plan of care     Jeffery Leiva, PT

## 2020-07-23 ENCOUNTER — CLINICAL SUPPORT (OUTPATIENT)
Dept: REHABILITATION | Facility: HOSPITAL | Age: 40
End: 2020-07-23
Attending: PHYSICAL MEDICINE & REHABILITATION
Payer: COMMERCIAL

## 2020-07-23 DIAGNOSIS — M54.50 PAIN OF LUMBAR SPINE WITH MOVEMENT: ICD-10-CM

## 2020-07-23 DIAGNOSIS — R26.89 DECREASED FUNCTIONAL MOBILITY: ICD-10-CM

## 2020-07-23 DIAGNOSIS — R26.89 DECREASED BACK MOBILITY: ICD-10-CM

## 2020-07-23 DIAGNOSIS — M62.81 WEAKNESS OF TRUNK MUSCULATURE: ICD-10-CM

## 2020-07-23 DIAGNOSIS — R25.9 DYSFUNCTIONAL MOVEMENTS: ICD-10-CM

## 2020-07-23 PROCEDURE — 97110 THERAPEUTIC EXERCISES: CPT | Mod: PO,CQ

## 2020-07-23 NOTE — PROGRESS NOTES
"  Physical Therapy Daily Treatment Note     Name: Yanna Kwong  Clinic Number: [id number]    Therapy Diagnosis:   Encounter Diagnoses   Name Primary?    Pain of lumbar spine with movement     Dysfunctional movements     Weakness of trunk musculature     Decreased back mobility     Decreased functional mobility      Physician: Shirin Krishna, *    Visit Date: 7/23/2020    Physician Orders: PT Eval and Treat back   Medical Diagnosis:  M54.42 (ICD-10-CM) - Acute left-sided low back pain with left-sided sciatica    G57.02 (ICD-10-CM) - Piriformis syndrome, left  Evaluation Date: 5/18/2020  Authorization Period Expiration: 12/31/2020  Plan of Care Certification Period: 8/18/2020  Visit #/Visits authorized: 16 / 20     Time In:  04:15 pm   Time Out: 05:00   pm   Total Billable Time: 45  minutes     Precautions: Standard    Subjective     Pt reports: Patient reports her pain is a little better no tingling to foot / toes        She was fairly compliant with her home exercise program. Did a lot of studying and able to do some.   Response to previous treatment: Felt good. No pain afterwards.     Functional change: Overall improved standing tolerance and can sit more comfortably and walk longer.  Sleeping better.     Pain: 0 /10     Location:  buttocks      Objective     Obi received therapeutic exercises to develop strength, endurance, ROM, flexibility, posture and core stabilization for 45 minutes including:    Upper extremity  bike: 6 minutes    Supine:  Hamstring stretch 10" x 9   Calf stretch with band 3 x 30"  Figure 4 stretch 10" x 9   DKTC 15 x 5" hold   TA draw with pelvic tilts  X 15  with 5" hold   TA with heel slides  in table top position: x 20   TA ball squeeze 5" x 12   TA ball push hold 5" x 12   Bridges x 15  Single Hip abduction with OTB x 10   Hip adduction with ball 10 x 5" hold   LTR opp arm OH x12  Rolling - log rolling x10 each   Double leg shuttle: 3 x 10 # 3 bands " "    SIDELYING    -clams / Reverse clams x12 YB  -open book x12    PRONE  -press up = DN 3x10    -trunk extension     QUADRUPED     SEATED    -Hamstring stretch 10" x 9   Green foam roller to L glut, IT band, and hip flexor for 8 minutes    MANUAL THERAPY: leg distraction  Soft tissue massage to piriformis x 10 minutes      Not performed:  Isometric heel push into mat x 10 single each side   Core isometrics with orange ball 10 x 3" hold each direction  Deadbugs     Home Exercises Provided and Patient Education Provided     Education provided:   - tennis ball massage to piriformis at home   -continue ice/heat PRN   Written Home Exercises Provided: Patient instructed to cont prior HEP and add exercises from today's session.   Exercises were reviewed and Obi was able to demonstrate them prior to the end of the session.  Obi demonstrated good  understanding of the education provided.     See EMR under Media for exercises provided 5/20/2020.     Assessment     Patient tolerated therapy session well. No increase in pain or adverse symptoms. Patient is progressing well towards goals.     Obi is progressing well towards her goals.   Pt prognosis is Fair.     Pt will continue to benefit from skilled outpatient physical therapy to address the deficits listed in the problem list box on initial evaluation, provide pt/family education and to maximize pt's level of independence in the home and community environment.     Pt's spiritual, cultural and educational needs considered and pt agreeable to plan of care and goals.     Anticipated barriers to physical therapy: none    Goals:   Short Term GOALS: 5 weeks. Pt agrees with goals set.  1. Pts pain intensity decreased 20-40% for improved quality of life and functional mobilty  2. Pt to demonstrate core activation with spinal stability during transitional movements for improved quality of movement  3. Pt to experience 50% or more reduced interruption of sleep due to reduced pain for " improved quality of life  4. Patient demonstrates independence with HEP for self management   5. Patient demonstrates independence with Postural Awareness for control of pain   6. Pt demonstrates active SLR to 70 degrees to improve functional mobility      Long Term GOALS: 10 weeks. Pt agrees with goals set.  1. Patient demonstrates increased spine mobility in all planes of movement with 50% or greater reduction of pain to improve functional mobility and tolerance to functional activities.   2. Patient demonstrates increased BLE hip extension mobility to 10 degrees or greater to improve functional mobility   3. Patient demonstrates improved overall function and return demonstration to functional ADL and recreational lifestyle  4. Pt demonstrates  [place] with body mechanics to control episodes of pain associated with daily ADL and improve functional lifestyle.          Plan     Continue per plan of care     Candelaria Esparza, PTA

## 2020-07-27 ENCOUNTER — CLINICAL SUPPORT (OUTPATIENT)
Dept: REHABILITATION | Facility: HOSPITAL | Age: 40
End: 2020-07-27
Attending: PHYSICAL MEDICINE & REHABILITATION
Payer: COMMERCIAL

## 2020-07-27 DIAGNOSIS — R25.9 DYSFUNCTIONAL MOVEMENTS: ICD-10-CM

## 2020-07-27 DIAGNOSIS — M54.50 PAIN OF LUMBAR SPINE WITH MOVEMENT: ICD-10-CM

## 2020-07-27 DIAGNOSIS — M62.81 WEAKNESS OF TRUNK MUSCULATURE: ICD-10-CM

## 2020-07-27 DIAGNOSIS — R26.89 DECREASED BACK MOBILITY: ICD-10-CM

## 2020-07-27 DIAGNOSIS — R26.89 DECREASED FUNCTIONAL MOBILITY: ICD-10-CM

## 2020-07-27 PROCEDURE — 97110 THERAPEUTIC EXERCISES: CPT | Mod: PO | Performed by: PHYSICAL THERAPIST

## 2020-07-27 NOTE — PROGRESS NOTES
"  Physical Therapy Daily Treatment Note     Name: Yanna Kwong  Clinic Number: [id number]    Therapy Diagnosis:   Encounter Diagnoses   Name Primary?    Pain of lumbar spine with movement     Dysfunctional movements     Weakness of trunk musculature     Decreased back mobility     Decreased functional mobility      Physician: Shirin Krishna, *    Visit Date: 7/27/2020    Physician Orders: PT Eval and Treat back   Medical Diagnosis:  M54.42 (ICD-10-CM) - Acute left-sided low back pain with left-sided sciatica    G57.02 (ICD-10-CM) - Piriformis syndrome, left  Evaluation Date: 5/18/2020  Authorization Period Expiration: 12/31/2020  Plan of Care Certification Period: 8/18/2020  Visit #/Visits authorized: 17 / 20     Time In: 1755 pm   Time Out: 1850   pm   Total Billable Time: 55 minutes     Precautions: Standard    Subjective     Pt reports: she is feeling really good. No significant pain to report.      She was fairly compliant with her home exercise program. Doing OK.   Response to previous treatment: Mild tenderness that resolved.   Functional change: Overall improved standing tolerance and can sit more comfortably and walk longer.  Sleeping better.     Pain: 0-.5  /10     Location:  buttocks      Objective     Obi received therapeutic exercises to develop strength, endurance, ROM, flexibility, posture and core stabilization for 55 minutes including:    Upper extremity  bike: 6 minutes    Supine:  Hamstring stretch 10" x 9   Calf stretch with band 3 x 30"  Figure 4 stretch 10" x 9   DKTC 15 x 5" hold   TA draw with pelvic tilts  X 15  with 5" hold   TA with heel slides  in table top position: x 20   TA ball squeeze 5" x 12   TA ball push hold 5" x 12   Bridges narrow / wide base x 15  Bridges lumbar locked x15  Bridges marching x15      LTR opp arm OH x12  Rolling - log rolling x10 each     Single Hip abduction with OTB x 10   Hip adduction with ball 10 x 5" hold Double leg shuttle: 3 x 10 # 3 " "bands     SIDELYING    -clams / Reverse clams x12 YB  -open book x12    PRONE  -press up = DN 3x10    -trunk extension     QUADRUPED     SEATED    -Hamstring stretch 10" x 9   Green foam roller to L glut, IT band, and hip flexor for 8 minutes    MANUAL THERAPY: leg distraction  Soft tissue massage to piriformis NP     Not performed:  Isometric heel push into mat x 10 single each side   Core isometrics with orange ball 10 x 3" hold each direction  Deadbugs     Home Exercises Provided and Patient Education Provided     Education provided:   - tennis ball massage to piriformis at home   -continue ice/heat PRN   Written Home Exercises Provided: Patient instructed to cont prior HEP and add exercises from today's session.   Exercises were reviewed and Obi was able to demonstrate them prior to the end of the session.  Obi demonstrated good  understanding of the education provided.     See EMR under Media for exercises provided 5/20/2020.     Assessment     Activities performed and completed without exacerbation of her pain.  She is demonstrating good mobility patterns w/o pain. Progressing towards trunk strengthening in quadruped and kneeling positions as well as standing.     Obi is progressing well towards her goals.   Pt prognosis is Fair.     Pt will continue to benefit from skilled outpatient physical therapy to address the deficits listed in the problem list box on initial evaluation, provide pt/family education and to maximize pt's level of independence in the home and community environment.     Pt's spiritual, cultural and educational needs considered and pt agreeable to plan of care and goals.     Anticipated barriers to physical therapy: none    Goals:   Short Term GOALS: 5 weeks. Pt agrees with goals set.  1. Pts pain intensity decreased 20-40% for improved quality of life and functional mobilty  2. Pt to demonstrate core activation with spinal stability during transitional movements for improved quality of " movement  3. Pt to experience 50% or more reduced interruption of sleep due to reduced pain for improved quality of life  4. Patient demonstrates independence with HEP for self management   5. Patient demonstrates independence with Postural Awareness for control of pain   6. Pt demonstrates active SLR to 70 degrees to improve functional mobility      Long Term GOALS: 10 weeks. Pt agrees with goals set.  1. Patient demonstrates increased spine mobility in all planes of movement with 50% or greater reduction of pain to improve functional mobility and tolerance to functional activities.   2. Patient demonstrates increased BLE hip extension mobility to 10 degrees or greater to improve functional mobility   3. Patient demonstrates improved overall function and return demonstration to functional ADL and recreational lifestyle  4. Pt demonstrates  [place] with body mechanics to control episodes of pain associated with daily ADL and improve functional lifestyle.          Plan     Continue per plan of care     Jeffery Leiva, PT

## 2020-07-30 ENCOUNTER — CLINICAL SUPPORT (OUTPATIENT)
Dept: REHABILITATION | Facility: HOSPITAL | Age: 40
End: 2020-07-30
Attending: PHYSICAL MEDICINE & REHABILITATION
Payer: COMMERCIAL

## 2020-07-30 DIAGNOSIS — M54.50 PAIN OF LUMBAR SPINE WITH MOVEMENT: ICD-10-CM

## 2020-07-30 DIAGNOSIS — R26.89 DECREASED FUNCTIONAL MOBILITY: ICD-10-CM

## 2020-07-30 DIAGNOSIS — R26.89 DECREASED BACK MOBILITY: ICD-10-CM

## 2020-07-30 DIAGNOSIS — R25.9 DYSFUNCTIONAL MOVEMENTS: ICD-10-CM

## 2020-07-30 DIAGNOSIS — M62.81 WEAKNESS OF TRUNK MUSCULATURE: ICD-10-CM

## 2020-07-30 PROCEDURE — 97110 THERAPEUTIC EXERCISES: CPT | Mod: PO,CQ

## 2020-07-30 NOTE — PROGRESS NOTES
"  Physical Therapy Daily Treatment Note     Name: Yanna Kwong  Clinic Number: [id number]    Therapy Diagnosis:   Encounter Diagnoses   Name Primary?    Pain of lumbar spine with movement     Dysfunctional movements     Weakness of trunk musculature     Decreased back mobility     Decreased functional mobility      Physician: Shirin Krishna, *    Visit Date: 7/30/2020    Physician Orders: PT Eval and Treat back   Medical Diagnosis:  M54.42 (ICD-10-CM) - Acute left-sided low back pain with left-sided sciatica    G57.02 (ICD-10-CM) - Piriformis syndrome, left  Evaluation Date: 5/18/2020  Authorization Period Expiration: 12/31/2020  Plan of Care Certification Period: 8/18/2020  Visit #/Visits authorized: 18 / 20     Time In: 05:50  pm   Time Out: 06:30   pm   Total Billable Time: 40  minutes   TE - 3     Precautions: Standard    Subjective     Pt reports: No reports of pain today      She was fairly compliant with her home exercise program. Doing OK.   Response to previous treatment: Mild tenderness that resolved.   Functional change: Overall improved standing tolerance and can sit more comfortably and walk longer.  Sleeping better.     Pain: 0-.5  /10     Location:  buttocks      Objective     Obi received therapeutic exercises to develop strength, endurance, ROM, flexibility, posture and core stabilization for 55 minutes including:    Upper extremity  bike: 6 minutes    Supine:  Hamstring stretch 10" x 9   Calf stretch with band 3 x 30"  Figure 4 stretch 10" x 9   DKTC 15 x 5" hold   TA draw with pelvic tilts  X 15  with 5" hold   TA with heel slides  in table top position: x 20   TA ball squeeze 5" x 12   TA ball push hold 5" x 12   Bridges narrow / wide base x 15  Bridges lumbar locked x15  Bridges marching x15      LTR opp arm OH x12  Rolling - log rolling x10 each     Single Hip abduction with OTB x 10   Hip adduction with ball 10 x 5" hold Double leg shuttle: 3 x 10 # 3 bands " "    SIDELYING    -clams / Reverse clams x12 YB  -open book x12    PRONE  -press up = DN 3x10    -trunk extension     QUADRUPED     SEATED    -Hamstring stretch 10" x 9   Green foam roller to L glut, IT band, and hip flexor for 8 minutes    MANUAL THERAPY: leg distraction  Soft tissue massage to piriformis NP     Not performed:  Isometric heel push into mat x 10 single each side   Core isometrics with orange ball 10 x 3" hold each direction  Deadbugs     Home Exercises Provided and Patient Education Provided     Education provided:   - tennis ball massage to piriformis at home   -continue ice/heat PRN   Written Home Exercises Provided: Patient instructed to cont prior HEP and add exercises from today's session.   Exercises were reviewed and Obi was able to demonstrate them prior to the end of the session.  Obi demonstrated good  understanding of the education provided.     See EMR under Media for exercises provided 5/20/2020.     Assessment     Patient tolerated therapy session well. No increase in pain or adverse symptoms. Demos  Improved core strength and postural awareness.     Obi is progressing well towards her goals.   Pt prognosis is Fair.     Pt will continue to benefit from skilled outpatient physical therapy to address the deficits listed in the problem list box on initial evaluation, provide pt/family education and to maximize pt's level of independence in the home and community environment.     Pt's spiritual, cultural and educational needs considered and pt agreeable to plan of care and goals.     Anticipated barriers to physical therapy: none    Goals:   Short Term GOALS: 5 weeks. Pt agrees with goals set.  1. Pts pain intensity decreased 20-40% for improved quality of life and functional mobilty  2. Pt to demonstrate core activation with spinal stability during transitional movements for improved quality of movement  3. Pt to experience 50% or more reduced interruption of sleep due to reduced pain for " improved quality of life  4. Patient demonstrates independence with HEP for self management   5. Patient demonstrates independence with Postural Awareness for control of pain   6. Pt demonstrates active SLR to 70 degrees to improve functional mobility      Long Term GOALS: 10 weeks. Pt agrees with goals set.  1. Patient demonstrates increased spine mobility in all planes of movement with 50% or greater reduction of pain to improve functional mobility and tolerance to functional activities.   2. Patient demonstrates increased BLE hip extension mobility to 10 degrees or greater to improve functional mobility   3. Patient demonstrates improved overall function and return demonstration to functional ADL and recreational lifestyle  4. Pt demonstrates  [place] with body mechanics to control episodes of pain associated with daily ADL and improve functional lifestyle.          Plan     Continue per plan of care     Candelaria Esparza, PTA

## 2020-08-03 ENCOUNTER — OFFICE VISIT (OUTPATIENT)
Dept: INTERNAL MEDICINE | Facility: CLINIC | Age: 40
End: 2020-08-03
Payer: COMMERCIAL

## 2020-08-03 VITALS
BODY MASS INDEX: 33.9 KG/M2 | SYSTOLIC BLOOD PRESSURE: 118 MMHG | WEIGHT: 191.38 LBS | OXYGEN SATURATION: 99 % | DIASTOLIC BLOOD PRESSURE: 60 MMHG | HEART RATE: 68 BPM

## 2020-08-03 DIAGNOSIS — Z00.00 ANNUAL PHYSICAL EXAM: Primary | ICD-10-CM

## 2020-08-03 DIAGNOSIS — R05.9 COUGH: ICD-10-CM

## 2020-08-03 PROCEDURE — 99999 PR PBB SHADOW E&M-EST. PATIENT-LVL IV: CPT | Mod: PBBFAC,,, | Performed by: PHYSICIAN ASSISTANT

## 2020-08-03 PROCEDURE — 99395 PREV VISIT EST AGE 18-39: CPT | Mod: S$GLB,,, | Performed by: PHYSICIAN ASSISTANT

## 2020-08-03 PROCEDURE — U0003 INFECTIOUS AGENT DETECTION BY NUCLEIC ACID (DNA OR RNA); SEVERE ACUTE RESPIRATORY SYNDROME CORONAVIRUS 2 (SARS-COV-2) (CORONAVIRUS DISEASE [COVID-19]), AMPLIFIED PROBE TECHNIQUE, MAKING USE OF HIGH THROUGHPUT TECHNOLOGIES AS DESCRIBED BY CMS-2020-01-R: HCPCS

## 2020-08-03 PROCEDURE — 99395 PR PREVENTIVE VISIT,EST,18-39: ICD-10-PCS | Mod: S$GLB,,, | Performed by: PHYSICIAN ASSISTANT

## 2020-08-03 PROCEDURE — 99999 PR PBB SHADOW E&M-EST. PATIENT-LVL IV: ICD-10-PCS | Mod: PBBFAC,,, | Performed by: PHYSICIAN ASSISTANT

## 2020-08-03 NOTE — PROGRESS NOTES
Subjective:       Patient ID: Yanna Kwong is a 40 y.o. female.    Chief Complaint: Annual Exam    HPI     Established pt of Lexie Chisholm MD     Here for annual exam.     Expresses some concern about COVID states for past week having a mild cough and congestion Taking OTC meds which help. No fevers, sob, n/v/d, body aches or smell/taste changes. She is working from home. No known COVID exposure. Currently in physical therapy for her back, improving.     Past Medical History:   Diagnosis Date    Thyroid nodule      Social History     Tobacco Use    Smoking status: Never Smoker    Smokeless tobacco: Never Used   Substance Use Topics    Alcohol use: No     Frequency: Never     Binge frequency: Never    Drug use: Never     Review of patient's allergies indicates:   Allergen Reactions    Vicodin [hydrocodone-acetaminophen] Other (See Comments)     tongue    Erythromycin Itching and Rash    Ilosone Rash       Current Outpatient Medications:     ascorbic acid (VITAMIN C) 1000 MG tablet, Take 1,000 mg by mouth once daily., Disp: , Rfl:     b complex vitamins capsule, Take 1 capsule by mouth once daily., Disp: , Rfl:     BIOTIN ORAL, Take 1 tablet by mouth once daily., Disp: , Rfl:     diclofenac (VOLTAREN) 75 MG EC tablet, Take 1 tablet (75 mg total) by mouth 2 (two) times daily., Disp: 60 tablet, Rfl: 2    fish oil-omega-3 fatty acids 300-1,000 mg capsule, Take 1 g by mouth once daily., Disp: , Rfl:     fluocinonide (LIDEX) 0.05 % external solution, AAA scalp qday - bid prn pruritus, Disp: 60 mL, Rfl: 3    folic acid (FOLVITE) 400 MCG tablet, Take 400 mcg by mouth once daily., Disp: , Rfl:     ketoconazole (NIZORAL) 2 % cream, AAA face bid prn for flares., Disp: 60 g, Rfl: 2    ketoconazole (NIZORAL) 2 % shampoo, Wash hair with medicated shampoo at least 2x/week - let sit on scalp at least 5 minutes prior to rinsing, Disp: 120 mL, Rfl: 5    methocarbamoL (ROBAXIN) 500 MG Tab, Take 1  tablet (500 mg total) by mouth 4 (four) times daily., Disp: 120 tablet, Rfl: 2    multivitamin (ONE DAILY MULTIVITAMIN) per tablet, Take 1 tablet by mouth once daily., Disp: , Rfl:     omeprazole (PRILOSEC) 20 MG capsule, Take 20 mg by mouth daily as needed., Disp: , Rfl:     vitamin E 400 UNIT capsule, Take 400 Units by mouth once daily., Disp: , Rfl:     There are no preventive care reminders to display for this patient.    Health Maintenance Topics with due status: Not Due       Topic Last Completion Date    TETANUS VACCINE 10/11/2018    Cervical Cancer Screening 12/07/2018    Influenza Vaccine 09/30/2019         Review of Systems   Constitutional: Positive for activity change. Negative for chills, diaphoresis, fever and unexpected weight change.   HENT: Positive for nasal congestion. Negative for hearing loss, rhinorrhea and trouble swallowing.    Eyes: Negative for discharge and visual disturbance.   Respiratory: Positive for cough. Negative for chest tightness and wheezing.    Cardiovascular: Negative for chest pain and palpitations.   Gastrointestinal: Negative for blood in stool, constipation, diarrhea and vomiting.   Endocrine: Negative for polydipsia and polyuria.   Genitourinary: Negative for difficulty urinating, dysuria, hematuria and menstrual problem.   Musculoskeletal: Negative for arthralgias, joint swelling and neck pain.   Neurological: Negative for weakness and headaches.   Psychiatric/Behavioral: Negative for confusion and dysphoric mood.         Objective: /60   Pulse 68   Wt 86.8 kg (191 lb 5.8 oz)   SpO2 99%   BMI 33.90 kg/m²         Physical Exam  Vitals signs reviewed.   Constitutional:       General: She is not in acute distress.     Appearance: Normal appearance. She is well-developed. She is not ill-appearing.   HENT:      Head: Normocephalic and atraumatic.      Right Ear: Tympanic membrane, ear canal and external ear normal.      Left Ear: Tympanic membrane, ear canal and  external ear normal.      Nose: Mucosal edema present.      Mouth/Throat:      Mouth: Mucous membranes are moist.      Pharynx: Oropharynx is clear.   Cardiovascular:      Rate and Rhythm: Normal rate and regular rhythm.      Heart sounds: No murmur. No friction rub.   Pulmonary:      Effort: Pulmonary effort is normal.      Breath sounds: Normal breath sounds. No wheezing or rales.   Abdominal:      General: Bowel sounds are normal.      Palpations: Abdomen is soft.      Tenderness: There is no abdominal tenderness.   Musculoskeletal:      Right lower leg: No edema.      Left lower leg: No edema.   Skin:     General: Skin is warm and dry.      Findings: No rash.   Neurological:      Mental Status: She is alert.         Assessment:       1. Annual physical exam    2. Cough        Plan:         Yanna was seen today for annual exam.    Diagnoses and all orders for this visit:    Annual physical exam  -     Comprehensive metabolic panel; Future  -     CBC auto differential; Future  -     TSH; Future  -     Lipid Panel; Future  -     VITAMIN D; Future    Cough  -Home isolation/quarantine advised until results obtained  -Continue OTC measures for syptom relief.  -Notify clinic of any worsening symptoms.   -     COVID-19 Routine Screening; Future  -     COVID-19 Routine Screening        Opal Gibson PA-C

## 2020-08-04 LAB — SARS-COV-2 RNA RESP QL NAA+PROBE: NOT DETECTED

## 2020-08-13 ENCOUNTER — CLINICAL SUPPORT (OUTPATIENT)
Dept: REHABILITATION | Facility: HOSPITAL | Age: 40
End: 2020-08-13
Attending: PHYSICAL MEDICINE & REHABILITATION
Payer: COMMERCIAL

## 2020-08-13 DIAGNOSIS — M54.50 PAIN OF LUMBAR SPINE WITH MOVEMENT: ICD-10-CM

## 2020-08-13 DIAGNOSIS — R25.9 DYSFUNCTIONAL MOVEMENTS: ICD-10-CM

## 2020-08-13 DIAGNOSIS — R26.89 DECREASED BACK MOBILITY: ICD-10-CM

## 2020-08-13 DIAGNOSIS — R26.89 DECREASED FUNCTIONAL MOBILITY: ICD-10-CM

## 2020-08-13 DIAGNOSIS — M62.81 WEAKNESS OF TRUNK MUSCULATURE: ICD-10-CM

## 2020-08-13 PROCEDURE — 97110 THERAPEUTIC EXERCISES: CPT | Mod: PO,CQ

## 2020-08-13 NOTE — PROGRESS NOTES
"    Physical Therapy Daily Treatment Note     Name: Yanna Kwong  Clinic Number: [id number]    Therapy Diagnosis:   Encounter Diagnoses   Name Primary?    Pain of lumbar spine with movement     Dysfunctional movements     Weakness of trunk musculature     Decreased back mobility     Decreased functional mobility      Physician: Shirin Krishna, *    Visit Date: 8/13/2020    Physician Orders: PT Eval and Treat back   Medical Diagnosis:  M54.42 (ICD-10-CM) - Acute left-sided low back pain with left-sided sciatica    G57.02 (ICD-10-CM) - Piriformis syndrome, left  Evaluation Date: 5/18/2020  Authorization Period Expiration: 12/31/2020  Plan of Care Certification Period: 8/18/2020  Visit #/Visits authorized: 19 / 20     Time In: 03:30  pm   Time Out: 04:15  pm   Total Billable Time: 45  minutes   TE - 3     Precautions: Standard    Subjective     Pt reports: It's been hurting again lately, since last week.     She was fairly compliant with her home exercise program. Doing OK.   Response to previous treatment: Mild tenderness that resolved.   Functional change: Overall improved standing tolerance and can sit more comfortably and walk longer.  Sleeping better.     Pain: 3  /10     Location:  buttocks      Objective     Obi received therapeutic exercises to develop strength, endurance, ROM, flexibility, posture and core stabilization for 45 minutes including:    Upper extremity  bike: 6 minutes    Supine:  Hamstring stretch 10" x 9   Calf stretch with band 3 x 30"  Figure 4 stretch 10" x 9   DKTC 15 x 5" hold   TA draw with pelvic tilts  X 15  with 5" hold   TA with heel slides  in table top position: x 20   TA ball squeeze 5" x 12   TA ball push hold 5" x 12   Bridges narrow / wide base x 15  Bridges lumbar locked x15  Bridges marching x15      LTR opp arm OH x12  Rolling - log rolling x10 each     Single Hip abduction with OTB x 10   Hip adduction with ball 10 x 5" hold Double leg shuttle: 3 x 10 # 3 " "bands     SIDELYING    -clams / Reverse clams x12 YB  -open book x12    PRONE  -press up = DN 3x10    -trunk extension     QUADRUPED     SEATED    -Hamstring stretch 10" x 9   Green foam roller to L glut, IT band, and hip flexor for 8 minutes    MANUAL THERAPY: leg distraction  Soft tissue massage to piriformis NP     Not performed:  Isometric heel push into mat x 10 single each side   Core isometrics with orange ball 10 x 3" hold each direction  Deadbugs     Home Exercises Provided and Patient Education Provided     Education provided:   - tennis ball massage to piriformis at home   -continue ice/heat PRN   Written Home Exercises Provided: Patient instructed to cont prior HEP and add exercises from today's session.   Exercises were reviewed and Obi was able to demonstrate them prior to the end of the session.  Obi demonstrated good  understanding of the education provided.     See EMR under Media for exercises provided 5/20/2020.     Assessment     Patient tolerated therapy session well. No increase in pain or adverse symptoms. Demos  Improved core strength and postural awareness. Continues to progress well towards goals.     Obi is progressing well towards her goals.   Pt prognosis is Fair.     Pt will continue to benefit from skilled outpatient physical therapy to address the deficits listed in the problem list box on initial evaluation, provide pt/family education and to maximize pt's level of independence in the home and community environment.     Pt's spiritual, cultural and educational needs considered and pt agreeable to plan of care and goals.     Anticipated barriers to physical therapy: none    Goals:   Short Term GOALS: 5 weeks. Pt agrees with goals set.  1. Pts pain intensity decreased 20-40% for improved quality of life and functional mobilty  2. Pt to demonstrate core activation with spinal stability during transitional movements for improved quality of movement  3. Pt to experience 50% or more " reduced interruption of sleep due to reduced pain for improved quality of life  4. Patient demonstrates independence with HEP for self management   5. Patient demonstrates independence with Postural Awareness for control of pain   6. Pt demonstrates active SLR to 70 degrees to improve functional mobility      Long Term GOALS: 10 weeks. Pt agrees with goals set.  1. Patient demonstrates increased spine mobility in all planes of movement with 50% or greater reduction of pain to improve functional mobility and tolerance to functional activities.   2. Patient demonstrates increased BLE hip extension mobility to 10 degrees or greater to improve functional mobility   3. Patient demonstrates improved overall function and return demonstration to functional ADL and recreational lifestyle  4. Pt demonstrates  [place] with body mechanics to control episodes of pain associated with daily ADL and improve functional lifestyle.          Plan     Continue per plan of care     Candelaria Esparza, PTA

## 2020-08-19 DIAGNOSIS — Z01.84 ANTIBODY RESPONSE EXAMINATION: ICD-10-CM

## 2020-08-20 ENCOUNTER — CLINICAL SUPPORT (OUTPATIENT)
Dept: REHABILITATION | Facility: HOSPITAL | Age: 40
End: 2020-08-20
Attending: PHYSICAL MEDICINE & REHABILITATION
Payer: COMMERCIAL

## 2020-08-20 DIAGNOSIS — R26.89 DECREASED FUNCTIONAL MOBILITY: ICD-10-CM

## 2020-08-20 DIAGNOSIS — R26.89 DECREASED BACK MOBILITY: ICD-10-CM

## 2020-08-20 DIAGNOSIS — M62.81 WEAKNESS OF TRUNK MUSCULATURE: ICD-10-CM

## 2020-08-20 DIAGNOSIS — M54.50 PAIN OF LUMBAR SPINE WITH MOVEMENT: ICD-10-CM

## 2020-08-20 DIAGNOSIS — R25.9 DYSFUNCTIONAL MOVEMENTS: ICD-10-CM

## 2020-08-20 PROCEDURE — 97110 THERAPEUTIC EXERCISES: CPT | Mod: PO,CQ

## 2020-08-20 NOTE — PROGRESS NOTES
"    Physical Therapy Daily Treatment Note     Name: Yanna Kwong  Clinic Number: [id number]    Therapy Diagnosis:   Encounter Diagnoses   Name Primary?    Pain of lumbar spine with movement     Dysfunctional movements     Weakness of trunk musculature     Decreased back mobility     Decreased functional mobility      Physician: Shirin Krishna, *    Visit Date: 8/20/2020    Physician Orders: PT Eval and Treat back   Medical Diagnosis:  M54.42 (ICD-10-CM) - Acute left-sided low back pain with left-sided sciatica    G57.02 (ICD-10-CM) - Piriformis syndrome, left  Evaluation Date: 5/18/2020  Authorization Period Expiration: 12/31/2020  Plan of Care Certification Period: 8/18/2020  Visit #/Visits authorized: 20 / 20     Time In: 05:45  pm   Time Out: 06:38  pm   Total Billable Time: 53  minutes   TE - 4     Precautions: Standard    Subjective     Pt reports: It has been hurting a little more lately.     She was  compliant with her home exercise program. Doing OK.   Response to previous treatment: Mild tenderness that resolved.   Functional change: Overall improved standing tolerance and can sit more comfortably and walk longer.  Sleeping better.     Pain: 2 /10     Location:  buttocks      Objective     Obi received therapeutic exercises to develop strength, endurance, ROM, flexibility, posture and core stabilization for 45 minutes including:    Upper extremity Bike: 6 minutes    Supine:  Hamstring stretch 10" x 9   Calf stretch with band 3 x 30"  Figure 4 stretch 10" x 9   DKTC 15 x 5" hold   TA draw with pelvic tilts  X 15  with 5" hold   TA with heel slides  in table top position: x 20   TA ball squeeze 5" x 12   TA ball push hold 5" x 12   Bridges narrow / wide base x 15  Bridges lumbar locked x15  Bridges marching x15    LTR opp arm OH x12  Rolling - log rolling x10 each     Single Hip abduction with OTB x 10   Hip adduction with ball 10 x 5" hold Double leg shuttle: 3 x 10 # 3 bands  " "    SIDELYING    -clams / Reverse clams x12 YB  -open book x12    PRONE  - press up = DN 3  x10    -trunk extension     QUADRUPED     SEATED    -Hamstring stretch 10" x 9   Green foam roller to L glut, IT band, and hip flexor for 8 minutes    MANUAL THERAPY: leg distraction  Soft tissue massage to piriformis NP     Not performed:  Isometric heel push into mat x 10 single each side   Core isometrics with orange ball 10 x 3" hold each direction  Deadbugs     Home Exercises Provided and Patient Education Provided     Education provided:   - tennis ball massage to piriformis at home   -continue ice/heat PRN   Written Home Exercises Provided: Patient instructed to cont prior HEP and add exercises from today's session.   Exercises were reviewed and Obi was able to demonstrate them prior to the end of the session.  Obi demonstrated good  understanding of the education provided.     See EMR under Media for exercises provided 5/20/2020.     Assessment     Patient tolerated therapy session well. Slight increase in pain with relaxing out of hamstring stretch and with figure four. Patient was however able to perform all task. Despite pin point pain reported at the piriformis. Demos  Improved core strength and postural awareness. Continues to progress well towards goals.     Obi is progressing well towards her goals.   Pt prognosis is Fair.     Pt will continue to benefit from skilled outpatient physical therapy to address the deficits listed in the problem list box on initial evaluation, provide pt/family education and to maximize pt's level of independence in the home and community environment.     Pt's spiritual, cultural and educational needs considered and pt agreeable to plan of care and goals.     Anticipated barriers to physical therapy: none    Goals:   Short Term GOALS: 5 weeks. Pt agrees with goals set.  1. Pts pain intensity decreased 20-40% for improved quality of life and functional mobilty  2. Pt to demonstrate " core activation with spinal stability during transitional movements for improved quality of movement  3. Pt to experience 50% or more reduced interruption of sleep due to reduced pain for improved quality of life  4. Patient demonstrates independence with HEP for self management   5. Patient demonstrates independence with Postural Awareness for control of pain   6. Pt demonstrates active SLR to 70 degrees to improve functional mobility      Long Term GOALS: 10 weeks. Pt agrees with goals set.  1. Patient demonstrates increased spine mobility in all planes of movement with 50% or greater reduction of pain to improve functional mobility and tolerance to functional activities.   2. Patient demonstrates increased BLE hip extension mobility to 10 degrees or greater to improve functional mobility   3. Patient demonstrates improved overall function and return demonstration to functional ADL and recreational lifestyle  4. Pt demonstrates  [place] with body mechanics to control episodes of pain associated with daily ADL and improve functional lifestyle.          Plan     Continue per plan of care     Candelaria Esparza, PTA

## 2020-09-01 ENCOUNTER — CLINICAL SUPPORT (OUTPATIENT)
Dept: REHABILITATION | Facility: HOSPITAL | Age: 40
End: 2020-09-01
Attending: PHYSICAL MEDICINE & REHABILITATION
Payer: COMMERCIAL

## 2020-09-01 DIAGNOSIS — R25.9 DYSFUNCTIONAL MOVEMENTS: ICD-10-CM

## 2020-09-01 DIAGNOSIS — R26.89 DECREASED BACK MOBILITY: ICD-10-CM

## 2020-09-01 DIAGNOSIS — R26.89 DECREASED FUNCTIONAL MOBILITY: ICD-10-CM

## 2020-09-01 DIAGNOSIS — M54.50 PAIN OF LUMBAR SPINE WITH MOVEMENT: ICD-10-CM

## 2020-09-01 DIAGNOSIS — M62.81 WEAKNESS OF TRUNK MUSCULATURE: ICD-10-CM

## 2020-09-01 PROCEDURE — 97110 THERAPEUTIC EXERCISES: CPT | Mod: PO | Performed by: PHYSICAL THERAPIST

## 2020-09-01 NOTE — PROGRESS NOTES
Physical Therapy Daily Treatment Note     Name: Yanna Kwong  Clinic Number: [id number]    Therapy Diagnosis:   Encounter Diagnoses   Name Primary?    Pain of lumbar spine with movement     Dysfunctional movements     Weakness of trunk musculature     Decreased back mobility     Decreased functional mobility      Physician: Shirin Krishna, *    Visit Date: 9/1/2020    Physician Orders: PT Eval and Treat back   Medical Diagnosis:  M54.42 (ICD-10-CM) - Acute left-sided low back pain with left-sided sciatica    G57.02 (ICD-10-CM) - Piriformis syndrome, left  Evaluation Date: 5/18/2020  Authorization Period Expiration: 12/31/2020  Plan of Care Certification Period: 8/18/2020  Visit #/Visits authorized: 20 / 20 +1    Time In: 1743  pm   Time Out:1805   pm   Total Billable Time: 22  minutes       Precautions: Standard    Subjective     Pt reports: doing OK. There is a little pain sometimes but none right now. Very slight pain this AM but after moving around it gets better.      She was  compliant with her home exercise program. Doing OK.   Response to previous treatment: Mild tenderness that resolved.   Functional change: Overall improved standing tolerance and can sit more comfortably and walk longer.  Sleeping better.     Pain:   0 /10     Location:  buttocks      Objective         Lumbar/Thoracic AROM: Pain/Dysfunction with Movement:   Flexion                              85/40  100/50 DP - DN stretch / pill left glut   Extension                          25/10  30/10 DP - DN compression in left glut    Right side bending               30 P NP   Left side bending                  30 P  N   Right rotation FN   Left rotation FN        FUNCTIONAL MOVEMENT BREAKOUTS:  Long sitting  = DP  FN  SLR   -Active   R - FN - FN  L -  DP - FN  -Passive  R - FN - FN but tight at end range in the hamstrings w/overstretch   L - FP - FN but tight at end range in the hamstrings w/overstretch   Knees to chest  "  Active - FP - FN      FLEXIBILITY  Hamstrings R 75/80   75/80    L  70/75  70/80  Piriformis      L  -no limitation           Obi received therapeutic exercises to develop strength, endurance, ROM, flexibility, posture and core stabilization for 22 minutes including:    Upper extremity Bike: 6 minutes    Supine:  Hamstring stretch 10" x 9   Calf stretch with band 3 x 30"  Figure 4 stretch 10" x 9   DKTC 15 x 5" hold   TA draw with pelvic tilts  X 15  with 5" hold   TA with heel slides  in table top position: x 20   TA ball squeeze 5" x 12   TA ball push hold 5" x 12   Bridges narrow / wide base x 15  Bridges lumbar locked x15  Bridges marching x15    LTR opp arm OH x12  Rolling - log rolling x10 each     Single Hip abduction with OTB x 10   Hip adduction with ball 10 x 5" hold Double leg shuttle: 3 x 10 # 3 bands      SIDELYING    -clams / Reverse clams x12 YB  -open book x12    PRONE  - press up = DN 3  x10    -trunk extension     QUADRUPED     SEATED    -Hamstring stretch 10" x 9   Green foam roller to L glut, IT band, and hip flexor for 8 minutes    MANUAL THERAPY: leg distraction  Soft tissue massage to piriformis NP     Not performed:  Isometric heel push into mat x 10 single each side   Core isometrics with orange ball 10 x 3" hold each direction  Deadbugs     Home Exercises Provided and Patient Education Provided     Education provided: doing OK.   - tennis ball massage to piriformis at home   -continue ice/heat PRN   Written Home Exercises Provided: Patient instructed to cont prior HEP and add exercises from today's session.   Exercises were reviewed and Obi was able to demonstrate them prior to the end of the session.  Obi demonstrated good  understanding of the education provided.     See EMR under Media for exercises provided 5/20/2020.     Assessment     The patient was reassessed for final session. Improvement in spine mobility and HS flexibility. Adequate hip mobility in flexion rotation and " extension that is functional.     Discharge reason : Met goals, Patient has maximized benefit from PT at this time and completed all authorized visits.   Goals Achieved: yes     Obi is progressing well towards her goals.   Pt prognosis is Fair.     Pt's spiritual, cultural and educational needs considered and pt agreeable to plan of care and goals.     Anticipated barriers to physical therapy: none    Goals:   Short Term GOALS: 5 weeks. Pt agrees with goals set    .ALL MET   1. Pts pain intensity decreased 20-40% for improved quality of life and functional mobilty  2. Pt to demonstrate core activation with spinal stability during transitional movements for improved quality of movement  3. Pt to experience 50% or more reduced interruption of sleep due to reduced pain for improved quality of life  4. Patient demonstrates independence with HEP for self management   5. Patient demonstrates independence with Postural Awareness for control of pain   6. Pt demonstrates active SLR to 70 degrees to improve functional mobility      Long Term GOALS: 10 weeks. Pt agrees with goals set.  ALL MET   1. Patient demonstrates increased spine mobility in all planes of movement with 50% or greater reduction of pain to improve functional mobility and tolerance to functional activities.   2. Patient demonstrates increased BLE hip extension mobility to 10 degrees or greater to improve functional mobility   3. Patient demonstrates improved overall function and return demonstration to functional ADL and recreational lifestyle  4. Pt demonstrates  [place] with body mechanics to control episodes of pain associated with daily ADL and improve functional lifestyle.          Plan     Continue per plan of care     Discharge reason : Met goals,    Discharge plan :Continue HEP,    Jeffery Leiva, PT

## 2020-09-18 DIAGNOSIS — Z01.84 ANTIBODY RESPONSE EXAMINATION: ICD-10-CM

## 2020-10-18 DIAGNOSIS — Z01.84 ANTIBODY RESPONSE EXAMINATION: ICD-10-CM

## 2020-10-20 ENCOUNTER — PATIENT MESSAGE (OUTPATIENT)
Dept: OBSTETRICS AND GYNECOLOGY | Facility: CLINIC | Age: 40
End: 2020-10-20

## 2020-10-20 ENCOUNTER — LAB VISIT (OUTPATIENT)
Dept: LAB | Facility: HOSPITAL | Age: 40
End: 2020-10-20
Attending: OBSTETRICS & GYNECOLOGY
Payer: COMMERCIAL

## 2020-10-20 DIAGNOSIS — N91.2 AMENORRHEA: ICD-10-CM

## 2020-10-20 DIAGNOSIS — N91.2 AMENORRHEA: Primary | ICD-10-CM

## 2020-10-20 LAB — HCG INTACT+B SERPL-ACNC: <1.2 MIU/ML

## 2020-10-20 PROCEDURE — 36415 COLL VENOUS BLD VENIPUNCTURE: CPT

## 2020-10-20 PROCEDURE — 84702 CHORIONIC GONADOTROPIN TEST: CPT

## 2020-10-21 ENCOUNTER — PATIENT MESSAGE (OUTPATIENT)
Dept: OBSTETRICS AND GYNECOLOGY | Facility: CLINIC | Age: 40
End: 2020-10-21

## 2020-11-17 DIAGNOSIS — Z01.84 ANTIBODY RESPONSE EXAMINATION: ICD-10-CM

## 2020-12-10 ENCOUNTER — PATIENT MESSAGE (OUTPATIENT)
Dept: OBSTETRICS AND GYNECOLOGY | Facility: CLINIC | Age: 40
End: 2020-12-10

## 2021-02-03 ENCOUNTER — TELEPHONE (OUTPATIENT)
Dept: OBSTETRICS AND GYNECOLOGY | Facility: CLINIC | Age: 41
End: 2021-02-03

## 2021-02-18 ENCOUNTER — IMMUNIZATION (OUTPATIENT)
Dept: INTERNAL MEDICINE | Facility: CLINIC | Age: 41
End: 2021-02-18
Payer: COMMERCIAL

## 2021-02-18 DIAGNOSIS — Z23 NEED FOR VACCINATION: Primary | ICD-10-CM

## 2021-02-18 PROCEDURE — 91300 COVID-19, MRNA, LNP-S, PF, 30 MCG/0.3 ML DOSE VACCINE: CPT | Mod: PBBFAC | Performed by: INTERNAL MEDICINE

## 2021-03-11 ENCOUNTER — IMMUNIZATION (OUTPATIENT)
Dept: INTERNAL MEDICINE | Facility: CLINIC | Age: 41
End: 2021-03-11
Payer: COMMERCIAL

## 2021-03-11 DIAGNOSIS — Z23 NEED FOR VACCINATION: Primary | ICD-10-CM

## 2021-03-11 PROCEDURE — 0002A COVID-19, MRNA, LNP-S, PF, 30 MCG/0.3 ML DOSE VACCINE: CPT | Mod: PBBFAC | Performed by: INTERNAL MEDICINE

## 2021-03-11 PROCEDURE — 91300 COVID-19, MRNA, LNP-S, PF, 30 MCG/0.3 ML DOSE VACCINE: CPT | Mod: PBBFAC | Performed by: INTERNAL MEDICINE

## 2021-03-14 ENCOUNTER — PATIENT MESSAGE (OUTPATIENT)
Dept: INTERNAL MEDICINE | Facility: CLINIC | Age: 41
End: 2021-03-14

## 2021-03-18 ENCOUNTER — PATIENT OUTREACH (OUTPATIENT)
Dept: ADMINISTRATIVE | Facility: OTHER | Age: 41
End: 2021-03-18

## 2021-03-22 ENCOUNTER — OFFICE VISIT (OUTPATIENT)
Dept: OBSTETRICS AND GYNECOLOGY | Facility: CLINIC | Age: 41
End: 2021-03-22
Payer: COMMERCIAL

## 2021-03-22 VITALS — DIASTOLIC BLOOD PRESSURE: 78 MMHG | BODY MASS INDEX: 33.13 KG/M2 | WEIGHT: 187 LBS | SYSTOLIC BLOOD PRESSURE: 124 MMHG

## 2021-03-22 DIAGNOSIS — Z12.31 ENCOUNTER FOR SCREENING MAMMOGRAM FOR MALIGNANT NEOPLASM OF BREAST: ICD-10-CM

## 2021-03-22 DIAGNOSIS — Z01.419 WELL WOMAN EXAM WITH ROUTINE GYNECOLOGICAL EXAM: Primary | ICD-10-CM

## 2021-03-22 DIAGNOSIS — Z12.4 ROUTINE CERVICAL SMEAR: ICD-10-CM

## 2021-03-22 DIAGNOSIS — N94.9 ADNEXAL FULLNESS: ICD-10-CM

## 2021-03-22 PROCEDURE — 1126F AMNT PAIN NOTED NONE PRSNT: CPT | Mod: S$GLB,,, | Performed by: OBSTETRICS & GYNECOLOGY

## 2021-03-22 PROCEDURE — 3008F PR BODY MASS INDEX (BMI) DOCUMENTED: ICD-10-PCS | Mod: CPTII,S$GLB,, | Performed by: OBSTETRICS & GYNECOLOGY

## 2021-03-22 PROCEDURE — 99999 PR PBB SHADOW E&M-EST. PATIENT-LVL III: CPT | Mod: PBBFAC,,, | Performed by: OBSTETRICS & GYNECOLOGY

## 2021-03-22 PROCEDURE — 3008F BODY MASS INDEX DOCD: CPT | Mod: CPTII,S$GLB,, | Performed by: OBSTETRICS & GYNECOLOGY

## 2021-03-22 PROCEDURE — 1126F PR PAIN SEVERITY QUANTIFIED, NO PAIN PRESENT: ICD-10-PCS | Mod: S$GLB,,, | Performed by: OBSTETRICS & GYNECOLOGY

## 2021-03-22 PROCEDURE — 99396 PREV VISIT EST AGE 40-64: CPT | Mod: S$GLB,,, | Performed by: OBSTETRICS & GYNECOLOGY

## 2021-03-22 PROCEDURE — 88175 CYTOPATH C/V AUTO FLUID REDO: CPT | Performed by: OBSTETRICS & GYNECOLOGY

## 2021-03-22 PROCEDURE — 99999 PR PBB SHADOW E&M-EST. PATIENT-LVL III: ICD-10-PCS | Mod: PBBFAC,,, | Performed by: OBSTETRICS & GYNECOLOGY

## 2021-03-22 PROCEDURE — 87624 HPV HI-RISK TYP POOLED RSLT: CPT | Performed by: OBSTETRICS & GYNECOLOGY

## 2021-03-22 PROCEDURE — 99396 PR PREVENTIVE VISIT,EST,40-64: ICD-10-PCS | Mod: S$GLB,,, | Performed by: OBSTETRICS & GYNECOLOGY

## 2021-03-25 LAB
FINAL PATHOLOGIC DIAGNOSIS: NORMAL
HPV HR 12 DNA SPEC QL NAA+PROBE: NEGATIVE
HPV16 AG SPEC QL: NEGATIVE
HPV18 DNA SPEC QL NAA+PROBE: NEGATIVE
Lab: NORMAL

## 2021-04-14 ENCOUNTER — HOSPITAL ENCOUNTER (OUTPATIENT)
Dept: RADIOLOGY | Facility: HOSPITAL | Age: 41
Discharge: HOME | End: 2021-04-14
Attending: OBSTETRICS & GYNECOLOGY
Payer: COMMERCIAL

## 2021-04-14 ENCOUNTER — PATIENT MESSAGE (OUTPATIENT)
Dept: OBSTETRICS AND GYNECOLOGY | Facility: CLINIC | Age: 41
End: 2021-04-14

## 2021-04-14 VITALS — HEIGHT: 63 IN | BODY MASS INDEX: 32.57 KG/M2 | WEIGHT: 183.81 LBS

## 2021-04-14 DIAGNOSIS — N94.9 ADNEXAL FULLNESS: ICD-10-CM

## 2021-04-14 DIAGNOSIS — Z12.31 ENCOUNTER FOR SCREENING MAMMOGRAM FOR MALIGNANT NEOPLASM OF BREAST: ICD-10-CM

## 2021-04-14 PROCEDURE — 77067 SCR MAMMO BI INCL CAD: CPT | Mod: 26,,, | Performed by: RADIOLOGY

## 2021-04-14 PROCEDURE — 77063 MAMMO DIGITAL SCREENING BILAT WITH TOMO: ICD-10-PCS | Mod: 26,,, | Performed by: RADIOLOGY

## 2021-04-14 PROCEDURE — 77067 SCR MAMMO BI INCL CAD: CPT | Mod: TC

## 2021-04-14 PROCEDURE — 76856 US PELVIS COMPLETE NON OB: ICD-10-PCS | Mod: 26,,, | Performed by: RADIOLOGY

## 2021-04-14 PROCEDURE — 77063 BREAST TOMOSYNTHESIS BI: CPT | Mod: 26,,, | Performed by: RADIOLOGY

## 2021-04-14 PROCEDURE — 76856 US EXAM PELVIC COMPLETE: CPT | Mod: 26,,, | Performed by: RADIOLOGY

## 2021-04-14 PROCEDURE — 77067 MAMMO DIGITAL SCREENING BILAT WITH TOMO: ICD-10-PCS | Mod: 26,,, | Performed by: RADIOLOGY

## 2021-04-14 PROCEDURE — 76856 US EXAM PELVIC COMPLETE: CPT | Mod: TC

## 2021-07-20 ENCOUNTER — CLINICAL SUPPORT (OUTPATIENT)
Dept: OTHER | Facility: CLINIC | Age: 41
End: 2021-07-20
Payer: COMMERCIAL

## 2021-07-20 DIAGNOSIS — Z00.8 ENCOUNTER FOR OTHER GENERAL EXAMINATION: ICD-10-CM

## 2021-07-21 VITALS — HEIGHT: 63 IN | BODY MASS INDEX: 32.56 KG/M2

## 2021-07-21 LAB
GLUCOSE SERPL-MCNC: 94 MG/DL (ref 60–140)
HDLC SERPL-MCNC: 48 MG/DL
POC CHOLESTEROL, LDL (DOCK): 118 MG/DL
POC CHOLESTEROL, TOTAL: 190 MG/DL
TRIGL SERPL-MCNC: 122 MG/DL

## 2021-08-10 ENCOUNTER — PATIENT MESSAGE (OUTPATIENT)
Dept: OBSTETRICS AND GYNECOLOGY | Facility: CLINIC | Age: 41
End: 2021-08-10

## 2021-08-10 DIAGNOSIS — Z11.3 SCREEN FOR STD (SEXUALLY TRANSMITTED DISEASE): Primary | ICD-10-CM

## 2021-08-11 ENCOUNTER — TELEPHONE (OUTPATIENT)
Dept: OBSTETRICS AND GYNECOLOGY | Facility: CLINIC | Age: 41
End: 2021-08-11

## 2021-08-12 ENCOUNTER — LAB VISIT (OUTPATIENT)
Dept: LAB | Facility: HOSPITAL | Age: 41
End: 2021-08-12
Attending: OBSTETRICS & GYNECOLOGY
Payer: COMMERCIAL

## 2021-08-12 DIAGNOSIS — Z11.3 SCREEN FOR STD (SEXUALLY TRANSMITTED DISEASE): ICD-10-CM

## 2021-08-12 PROCEDURE — 87389 HIV-1 AG W/HIV-1&-2 AB AG IA: CPT | Performed by: OBSTETRICS & GYNECOLOGY

## 2021-08-12 PROCEDURE — 87591 N.GONORRHOEAE DNA AMP PROB: CPT | Performed by: OBSTETRICS & GYNECOLOGY

## 2021-08-12 PROCEDURE — 87340 HEPATITIS B SURFACE AG IA: CPT | Performed by: OBSTETRICS & GYNECOLOGY

## 2021-08-12 PROCEDURE — 86592 SYPHILIS TEST NON-TREP QUAL: CPT | Performed by: OBSTETRICS & GYNECOLOGY

## 2021-08-12 PROCEDURE — 36415 COLL VENOUS BLD VENIPUNCTURE: CPT | Performed by: OBSTETRICS & GYNECOLOGY

## 2021-08-12 PROCEDURE — 87491 CHLMYD TRACH DNA AMP PROBE: CPT | Performed by: OBSTETRICS & GYNECOLOGY

## 2021-08-13 LAB
C TRACH DNA SPEC QL NAA+PROBE: NOT DETECTED
HBV SURFACE AG SERPL QL IA: NEGATIVE
HIV 1+2 AB+HIV1 P24 AG SERPL QL IA: NEGATIVE
N GONORRHOEA DNA SPEC QL NAA+PROBE: NOT DETECTED
RPR SER QL: NORMAL

## 2021-08-17 ENCOUNTER — PATIENT MESSAGE (OUTPATIENT)
Dept: OBSTETRICS AND GYNECOLOGY | Facility: CLINIC | Age: 41
End: 2021-08-17

## 2021-08-17 ENCOUNTER — NURSE TRIAGE (OUTPATIENT)
Dept: ADMINISTRATIVE | Facility: CLINIC | Age: 41
End: 2021-08-17

## 2021-08-17 RX ORDER — FLUCONAZOLE 100 MG/1
100 TABLET ORAL
Qty: 3 TABLET | Refills: 0 | Status: SHIPPED | OUTPATIENT
Start: 2021-08-17 | End: 2021-08-24

## 2021-08-17 RX ORDER — CLOTRIMAZOLE AND BETAMETHASONE DIPROPIONATE 10; .64 MG/G; MG/G
CREAM TOPICAL
Qty: 15 G | Refills: 0 | Status: SHIPPED | OUTPATIENT
Start: 2021-08-17 | End: 2023-01-02

## 2021-08-18 ENCOUNTER — OFFICE VISIT (OUTPATIENT)
Dept: OBSTETRICS AND GYNECOLOGY | Facility: CLINIC | Age: 41
End: 2021-08-18
Payer: COMMERCIAL

## 2021-08-18 ENCOUNTER — PATIENT MESSAGE (OUTPATIENT)
Dept: OBSTETRICS AND GYNECOLOGY | Facility: CLINIC | Age: 41
End: 2021-08-18

## 2021-08-18 VITALS — SYSTOLIC BLOOD PRESSURE: 110 MMHG | DIASTOLIC BLOOD PRESSURE: 72 MMHG | WEIGHT: 182 LBS | BODY MASS INDEX: 32.24 KG/M2

## 2021-08-18 DIAGNOSIS — N89.8 VAGINAL DISCHARGE: Primary | ICD-10-CM

## 2021-08-18 PROCEDURE — 3008F BODY MASS INDEX DOCD: CPT | Mod: CPTII,S$GLB,, | Performed by: OBSTETRICS & GYNECOLOGY

## 2021-08-18 PROCEDURE — 1126F PR PAIN SEVERITY QUANTIFIED, NO PAIN PRESENT: ICD-10-PCS | Mod: CPTII,S$GLB,, | Performed by: OBSTETRICS & GYNECOLOGY

## 2021-08-18 PROCEDURE — 3074F PR MOST RECENT SYSTOLIC BLOOD PRESSURE < 130 MM HG: ICD-10-PCS | Mod: CPTII,S$GLB,, | Performed by: OBSTETRICS & GYNECOLOGY

## 2021-08-18 PROCEDURE — 1160F PR REVIEW ALL MEDS BY PRESCRIBER/CLIN PHARMACIST DOCUMENTED: ICD-10-PCS | Mod: CPTII,S$GLB,, | Performed by: OBSTETRICS & GYNECOLOGY

## 2021-08-18 PROCEDURE — 1159F PR MEDICATION LIST DOCUMENTED IN MEDICAL RECORD: ICD-10-PCS | Mod: CPTII,S$GLB,, | Performed by: OBSTETRICS & GYNECOLOGY

## 2021-08-18 PROCEDURE — 99999 PR PBB SHADOW E&M-EST. PATIENT-LVL III: ICD-10-PCS | Mod: PBBFAC,,, | Performed by: OBSTETRICS & GYNECOLOGY

## 2021-08-18 PROCEDURE — 99999 PR PBB SHADOW E&M-EST. PATIENT-LVL III: CPT | Mod: PBBFAC,,, | Performed by: OBSTETRICS & GYNECOLOGY

## 2021-08-18 PROCEDURE — 1126F AMNT PAIN NOTED NONE PRSNT: CPT | Mod: CPTII,S$GLB,, | Performed by: OBSTETRICS & GYNECOLOGY

## 2021-08-18 PROCEDURE — 1159F MED LIST DOCD IN RCRD: CPT | Mod: CPTII,S$GLB,, | Performed by: OBSTETRICS & GYNECOLOGY

## 2021-08-18 PROCEDURE — 3008F PR BODY MASS INDEX (BMI) DOCUMENTED: ICD-10-PCS | Mod: CPTII,S$GLB,, | Performed by: OBSTETRICS & GYNECOLOGY

## 2021-08-18 PROCEDURE — 87481 CANDIDA DNA AMP PROBE: CPT | Mod: 59 | Performed by: OBSTETRICS & GYNECOLOGY

## 2021-08-18 PROCEDURE — 1160F RVW MEDS BY RX/DR IN RCRD: CPT | Mod: CPTII,S$GLB,, | Performed by: OBSTETRICS & GYNECOLOGY

## 2021-08-18 PROCEDURE — 3078F PR MOST RECENT DIASTOLIC BLOOD PRESSURE < 80 MM HG: ICD-10-PCS | Mod: CPTII,S$GLB,, | Performed by: OBSTETRICS & GYNECOLOGY

## 2021-08-18 PROCEDURE — 3078F DIAST BP <80 MM HG: CPT | Mod: CPTII,S$GLB,, | Performed by: OBSTETRICS & GYNECOLOGY

## 2021-08-18 PROCEDURE — 3074F SYST BP LT 130 MM HG: CPT | Mod: CPTII,S$GLB,, | Performed by: OBSTETRICS & GYNECOLOGY

## 2021-08-18 PROCEDURE — 99213 OFFICE O/P EST LOW 20 MIN: CPT | Mod: S$GLB,,, | Performed by: OBSTETRICS & GYNECOLOGY

## 2021-08-18 PROCEDURE — 99213 PR OFFICE/OUTPT VISIT, EST, LEVL III, 20-29 MIN: ICD-10-PCS | Mod: S$GLB,,, | Performed by: OBSTETRICS & GYNECOLOGY

## 2021-08-23 ENCOUNTER — PATIENT MESSAGE (OUTPATIENT)
Dept: OBSTETRICS AND GYNECOLOGY | Facility: CLINIC | Age: 41
End: 2021-08-23

## 2021-08-23 LAB
BACTERIAL VAGINOSIS DNA: NEGATIVE
CANDIDA GLABRATA DNA: NEGATIVE
CANDIDA KRUSEI DNA: NEGATIVE
CANDIDA RRNA VAG QL PROBE: POSITIVE
T VAGINALIS RRNA GENITAL QL PROBE: NEGATIVE

## 2021-08-26 ENCOUNTER — OFFICE VISIT (OUTPATIENT)
Dept: OBSTETRICS AND GYNECOLOGY | Facility: CLINIC | Age: 41
End: 2021-08-26
Payer: COMMERCIAL

## 2021-08-26 ENCOUNTER — PATIENT MESSAGE (OUTPATIENT)
Dept: OBSTETRICS AND GYNECOLOGY | Facility: CLINIC | Age: 41
End: 2021-08-26

## 2021-08-26 VITALS — WEIGHT: 182 LBS | DIASTOLIC BLOOD PRESSURE: 72 MMHG | SYSTOLIC BLOOD PRESSURE: 112 MMHG | BODY MASS INDEX: 32.24 KG/M2

## 2021-08-26 DIAGNOSIS — L73.1 INGROWN HAIR: ICD-10-CM

## 2021-08-26 DIAGNOSIS — L30.4 CHAFING: Primary | ICD-10-CM

## 2021-08-26 PROCEDURE — 1126F PR PAIN SEVERITY QUANTIFIED, NO PAIN PRESENT: ICD-10-PCS | Mod: CPTII,S$GLB,, | Performed by: OBSTETRICS & GYNECOLOGY

## 2021-08-26 PROCEDURE — 99999 PR PBB SHADOW E&M-EST. PATIENT-LVL III: ICD-10-PCS | Mod: PBBFAC,,, | Performed by: OBSTETRICS & GYNECOLOGY

## 2021-08-26 PROCEDURE — 1159F PR MEDICATION LIST DOCUMENTED IN MEDICAL RECORD: ICD-10-PCS | Mod: CPTII,S$GLB,, | Performed by: OBSTETRICS & GYNECOLOGY

## 2021-08-26 PROCEDURE — 99212 PR OFFICE/OUTPT VISIT, EST, LEVL II, 10-19 MIN: ICD-10-PCS | Mod: S$GLB,,, | Performed by: OBSTETRICS & GYNECOLOGY

## 2021-08-26 PROCEDURE — 1160F PR REVIEW ALL MEDS BY PRESCRIBER/CLIN PHARMACIST DOCUMENTED: ICD-10-PCS | Mod: CPTII,S$GLB,, | Performed by: OBSTETRICS & GYNECOLOGY

## 2021-08-26 PROCEDURE — 1160F RVW MEDS BY RX/DR IN RCRD: CPT | Mod: CPTII,S$GLB,, | Performed by: OBSTETRICS & GYNECOLOGY

## 2021-08-26 PROCEDURE — 99212 OFFICE O/P EST SF 10 MIN: CPT | Mod: S$GLB,,, | Performed by: OBSTETRICS & GYNECOLOGY

## 2021-08-26 PROCEDURE — 1126F AMNT PAIN NOTED NONE PRSNT: CPT | Mod: CPTII,S$GLB,, | Performed by: OBSTETRICS & GYNECOLOGY

## 2021-08-26 PROCEDURE — 3078F PR MOST RECENT DIASTOLIC BLOOD PRESSURE < 80 MM HG: ICD-10-PCS | Mod: CPTII,S$GLB,, | Performed by: OBSTETRICS & GYNECOLOGY

## 2021-08-26 PROCEDURE — 3008F PR BODY MASS INDEX (BMI) DOCUMENTED: ICD-10-PCS | Mod: CPTII,S$GLB,, | Performed by: OBSTETRICS & GYNECOLOGY

## 2021-08-26 PROCEDURE — 1159F MED LIST DOCD IN RCRD: CPT | Mod: CPTII,S$GLB,, | Performed by: OBSTETRICS & GYNECOLOGY

## 2021-08-26 PROCEDURE — 3074F SYST BP LT 130 MM HG: CPT | Mod: CPTII,S$GLB,, | Performed by: OBSTETRICS & GYNECOLOGY

## 2021-08-26 PROCEDURE — 3008F BODY MASS INDEX DOCD: CPT | Mod: CPTII,S$GLB,, | Performed by: OBSTETRICS & GYNECOLOGY

## 2021-08-26 PROCEDURE — 99999 PR PBB SHADOW E&M-EST. PATIENT-LVL III: CPT | Mod: PBBFAC,,, | Performed by: OBSTETRICS & GYNECOLOGY

## 2021-08-26 PROCEDURE — 3078F DIAST BP <80 MM HG: CPT | Mod: CPTII,S$GLB,, | Performed by: OBSTETRICS & GYNECOLOGY

## 2021-08-26 PROCEDURE — 3074F PR MOST RECENT SYSTOLIC BLOOD PRESSURE < 130 MM HG: ICD-10-PCS | Mod: CPTII,S$GLB,, | Performed by: OBSTETRICS & GYNECOLOGY

## 2021-08-27 ENCOUNTER — PATIENT MESSAGE (OUTPATIENT)
Dept: OBSTETRICS AND GYNECOLOGY | Facility: CLINIC | Age: 41
End: 2021-08-27

## 2021-10-11 ENCOUNTER — PATIENT OUTREACH (OUTPATIENT)
Dept: ADMINISTRATIVE | Facility: OTHER | Age: 41
End: 2021-10-11

## 2021-10-13 ENCOUNTER — OFFICE VISIT (OUTPATIENT)
Dept: OBSTETRICS AND GYNECOLOGY | Facility: CLINIC | Age: 41
End: 2021-10-13
Payer: COMMERCIAL

## 2021-10-13 VITALS
DIASTOLIC BLOOD PRESSURE: 68 MMHG | WEIGHT: 179.69 LBS | BODY MASS INDEX: 31.83 KG/M2 | SYSTOLIC BLOOD PRESSURE: 110 MMHG

## 2021-10-13 DIAGNOSIS — N63.0 BREAST MASS: ICD-10-CM

## 2021-10-13 DIAGNOSIS — N89.8 VAGINAL DISCHARGE: Primary | ICD-10-CM

## 2021-10-13 PROCEDURE — 3074F PR MOST RECENT SYSTOLIC BLOOD PRESSURE < 130 MM HG: ICD-10-PCS | Mod: CPTII,S$GLB,, | Performed by: OBSTETRICS & GYNECOLOGY

## 2021-10-13 PROCEDURE — 99213 PR OFFICE/OUTPT VISIT, EST, LEVL III, 20-29 MIN: ICD-10-PCS | Mod: S$GLB,,, | Performed by: OBSTETRICS & GYNECOLOGY

## 2021-10-13 PROCEDURE — 3078F DIAST BP <80 MM HG: CPT | Mod: CPTII,S$GLB,, | Performed by: OBSTETRICS & GYNECOLOGY

## 2021-10-13 PROCEDURE — 3008F PR BODY MASS INDEX (BMI) DOCUMENTED: ICD-10-PCS | Mod: CPTII,S$GLB,, | Performed by: OBSTETRICS & GYNECOLOGY

## 2021-10-13 PROCEDURE — 99213 OFFICE O/P EST LOW 20 MIN: CPT | Mod: S$GLB,,, | Performed by: OBSTETRICS & GYNECOLOGY

## 2021-10-13 PROCEDURE — 99999 PR PBB SHADOW E&M-EST. PATIENT-LVL III: ICD-10-PCS | Mod: PBBFAC,,, | Performed by: OBSTETRICS & GYNECOLOGY

## 2021-10-13 PROCEDURE — 3008F BODY MASS INDEX DOCD: CPT | Mod: CPTII,S$GLB,, | Performed by: OBSTETRICS & GYNECOLOGY

## 2021-10-13 PROCEDURE — 87481 CANDIDA DNA AMP PROBE: CPT | Mod: 59 | Performed by: OBSTETRICS & GYNECOLOGY

## 2021-10-13 PROCEDURE — 3078F PR MOST RECENT DIASTOLIC BLOOD PRESSURE < 80 MM HG: ICD-10-PCS | Mod: CPTII,S$GLB,, | Performed by: OBSTETRICS & GYNECOLOGY

## 2021-10-13 PROCEDURE — 3074F SYST BP LT 130 MM HG: CPT | Mod: CPTII,S$GLB,, | Performed by: OBSTETRICS & GYNECOLOGY

## 2021-10-13 PROCEDURE — 99999 PR PBB SHADOW E&M-EST. PATIENT-LVL III: CPT | Mod: PBBFAC,,, | Performed by: OBSTETRICS & GYNECOLOGY

## 2021-10-13 PROCEDURE — 1159F PR MEDICATION LIST DOCUMENTED IN MEDICAL RECORD: ICD-10-PCS | Mod: CPTII,S$GLB,, | Performed by: OBSTETRICS & GYNECOLOGY

## 2021-10-13 PROCEDURE — 1159F MED LIST DOCD IN RCRD: CPT | Mod: CPTII,S$GLB,, | Performed by: OBSTETRICS & GYNECOLOGY

## 2021-10-13 RX ORDER — AMOXICILLIN AND CLAVULANATE POTASSIUM 875; 125 MG/1; MG/1
1 TABLET, FILM COATED ORAL 2 TIMES DAILY
Qty: 14 TABLET | Refills: 0 | Status: SHIPPED | OUTPATIENT
Start: 2021-10-13 | End: 2021-10-20

## 2021-10-13 RX ORDER — TRIAMCINOLONE ACETONIDE 1 MG/G
CREAM TOPICAL 2 TIMES DAILY
Qty: 45 G | Refills: 1 | Status: SHIPPED | OUTPATIENT
Start: 2021-10-13 | End: 2023-01-02

## 2021-10-14 ENCOUNTER — PATIENT MESSAGE (OUTPATIENT)
Dept: OBSTETRICS AND GYNECOLOGY | Facility: CLINIC | Age: 41
End: 2021-10-14

## 2021-10-15 ENCOUNTER — PATIENT MESSAGE (OUTPATIENT)
Dept: OBSTETRICS AND GYNECOLOGY | Facility: CLINIC | Age: 41
End: 2021-10-15
Payer: COMMERCIAL

## 2021-10-15 ENCOUNTER — PATIENT MESSAGE (OUTPATIENT)
Dept: OBSTETRICS AND GYNECOLOGY | Facility: CLINIC | Age: 41
End: 2021-10-15

## 2021-10-15 RX ORDER — FLUCONAZOLE 100 MG/1
100 TABLET ORAL
Qty: 3 TABLET | Refills: 0 | Status: SHIPPED | OUTPATIENT
Start: 2021-10-15 | End: 2021-10-22

## 2021-10-15 RX ORDER — NYSTATIN 100000 U/G
CREAM TOPICAL 2 TIMES DAILY
Qty: 15 G | Refills: 0 | Status: SHIPPED | OUTPATIENT
Start: 2021-10-15 | End: 2023-01-02

## 2021-10-21 ENCOUNTER — PATIENT MESSAGE (OUTPATIENT)
Dept: OBSTETRICS AND GYNECOLOGY | Facility: CLINIC | Age: 41
End: 2021-10-21
Payer: COMMERCIAL

## 2021-10-27 ENCOUNTER — HOSPITAL ENCOUNTER (OUTPATIENT)
Dept: RADIOLOGY | Facility: HOSPITAL | Age: 41
Discharge: HOME | End: 2021-10-27
Attending: OBSTETRICS & GYNECOLOGY
Payer: COMMERCIAL

## 2021-10-27 VITALS — HEIGHT: 63 IN | WEIGHT: 175 LBS | BODY MASS INDEX: 31.01 KG/M2

## 2021-10-27 DIAGNOSIS — N63.0 BREAST MASS: ICD-10-CM

## 2021-10-27 PROCEDURE — 77061 BREAST TOMOSYNTHESIS UNI: CPT | Mod: TC,RT

## 2021-10-27 PROCEDURE — 77065 MAMMO DIGITAL DIAGNOSTIC RIGHT WITH TOMO: ICD-10-PCS | Mod: 26,RT,, | Performed by: RADIOLOGY

## 2021-10-27 PROCEDURE — 77065 DX MAMMO INCL CAD UNI: CPT | Mod: 26,RT,, | Performed by: RADIOLOGY

## 2021-10-27 PROCEDURE — 76642 ULTRASOUND BREAST LIMITED: CPT | Mod: TC,RT

## 2021-10-27 PROCEDURE — G0279 TOMOSYNTHESIS, MAMMO: HCPCS | Mod: 26,RT,, | Performed by: RADIOLOGY

## 2021-10-27 PROCEDURE — 76642 ULTRASOUND BREAST LIMITED: CPT | Mod: 26,RT,, | Performed by: RADIOLOGY

## 2021-10-27 PROCEDURE — 76642 US BREAST RIGHT LIMITED: ICD-10-PCS | Mod: 26,RT,, | Performed by: RADIOLOGY

## 2021-10-27 PROCEDURE — G0279 MAMMO DIGITAL DIAGNOSTIC RIGHT WITH TOMO: ICD-10-PCS | Mod: 26,RT,, | Performed by: RADIOLOGY

## 2021-10-28 ENCOUNTER — TELEPHONE (OUTPATIENT)
Dept: RADIOLOGY | Facility: HOSPITAL | Age: 41
End: 2021-10-28
Payer: COMMERCIAL

## 2021-10-29 ENCOUNTER — OFFICE VISIT (OUTPATIENT)
Dept: INTERNAL MEDICINE | Facility: CLINIC | Age: 41
End: 2021-10-29
Payer: COMMERCIAL

## 2021-10-29 VITALS
DIASTOLIC BLOOD PRESSURE: 70 MMHG | RESPIRATION RATE: 16 BRPM | BODY MASS INDEX: 30.66 KG/M2 | WEIGHT: 173.06 LBS | HEART RATE: 84 BPM | OXYGEN SATURATION: 99 % | SYSTOLIC BLOOD PRESSURE: 116 MMHG | HEIGHT: 63 IN

## 2021-10-29 DIAGNOSIS — L30.4 INTERTRIGO: ICD-10-CM

## 2021-10-29 DIAGNOSIS — Z11.3 ROUTINE SCREENING FOR STI (SEXUALLY TRANSMITTED INFECTION): ICD-10-CM

## 2021-10-29 DIAGNOSIS — Z00.00 ANNUAL PHYSICAL EXAM: Primary | ICD-10-CM

## 2021-10-29 PROCEDURE — 1159F MED LIST DOCD IN RCRD: CPT | Mod: CPTII,S$GLB,, | Performed by: PHYSICIAN ASSISTANT

## 2021-10-29 PROCEDURE — 1159F PR MEDICATION LIST DOCUMENTED IN MEDICAL RECORD: ICD-10-PCS | Mod: CPTII,S$GLB,, | Performed by: PHYSICIAN ASSISTANT

## 2021-10-29 PROCEDURE — 3078F PR MOST RECENT DIASTOLIC BLOOD PRESSURE < 80 MM HG: ICD-10-PCS | Mod: CPTII,S$GLB,, | Performed by: PHYSICIAN ASSISTANT

## 2021-10-29 PROCEDURE — 3008F BODY MASS INDEX DOCD: CPT | Mod: CPTII,S$GLB,, | Performed by: PHYSICIAN ASSISTANT

## 2021-10-29 PROCEDURE — 1160F PR REVIEW ALL MEDS BY PRESCRIBER/CLIN PHARMACIST DOCUMENTED: ICD-10-PCS | Mod: CPTII,S$GLB,, | Performed by: PHYSICIAN ASSISTANT

## 2021-10-29 PROCEDURE — 99396 PR PREVENTIVE VISIT,EST,40-64: ICD-10-PCS | Mod: 25,S$GLB,, | Performed by: PHYSICIAN ASSISTANT

## 2021-10-29 PROCEDURE — 3074F SYST BP LT 130 MM HG: CPT | Mod: CPTII,S$GLB,, | Performed by: PHYSICIAN ASSISTANT

## 2021-10-29 PROCEDURE — 1160F RVW MEDS BY RX/DR IN RCRD: CPT | Mod: CPTII,S$GLB,, | Performed by: PHYSICIAN ASSISTANT

## 2021-10-29 PROCEDURE — 3074F PR MOST RECENT SYSTOLIC BLOOD PRESSURE < 130 MM HG: ICD-10-PCS | Mod: CPTII,S$GLB,, | Performed by: PHYSICIAN ASSISTANT

## 2021-10-29 PROCEDURE — 99999 PR PBB SHADOW E&M-EST. PATIENT-LVL V: ICD-10-PCS | Mod: PBBFAC,,, | Performed by: PHYSICIAN ASSISTANT

## 2021-10-29 PROCEDURE — 3008F PR BODY MASS INDEX (BMI) DOCUMENTED: ICD-10-PCS | Mod: CPTII,S$GLB,, | Performed by: PHYSICIAN ASSISTANT

## 2021-10-29 PROCEDURE — 3078F DIAST BP <80 MM HG: CPT | Mod: CPTII,S$GLB,, | Performed by: PHYSICIAN ASSISTANT

## 2021-10-29 PROCEDURE — 99396 PREV VISIT EST AGE 40-64: CPT | Mod: 25,S$GLB,, | Performed by: PHYSICIAN ASSISTANT

## 2021-10-29 PROCEDURE — 99999 PR PBB SHADOW E&M-EST. PATIENT-LVL V: CPT | Mod: PBBFAC,,, | Performed by: PHYSICIAN ASSISTANT

## 2021-11-02 ENCOUNTER — HOSPITAL ENCOUNTER (OUTPATIENT)
Dept: RADIOLOGY | Facility: HOSPITAL | Age: 41
Discharge: HOME | End: 2021-11-02
Attending: OBSTETRICS & GYNECOLOGY
Payer: COMMERCIAL

## 2021-11-02 DIAGNOSIS — R92.8 ABNORMAL FINDING ON BREAST IMAGING: ICD-10-CM

## 2021-11-02 DIAGNOSIS — N63.0 BREAST MASS: Primary | ICD-10-CM

## 2021-11-02 PROCEDURE — 19083 US BREAST BIOPSY WITH IMAGING 1ST SITE RIGHT: ICD-10-PCS | Mod: RT,,, | Performed by: RADIOLOGY

## 2021-11-02 PROCEDURE — 19083 BX BREAST 1ST LESION US IMAG: CPT | Mod: RT,,, | Performed by: RADIOLOGY

## 2021-11-02 PROCEDURE — 77065 MAMMO DIGITAL DIAGNOSTIC RIGHT: ICD-10-PCS | Mod: 26,RT,, | Performed by: RADIOLOGY

## 2021-11-02 PROCEDURE — 27201068 US BREAST BIOPSY WITH IMAGING 1ST SITE RIGHT

## 2021-11-02 PROCEDURE — 25000003 PHARM REV CODE 250: Performed by: OBSTETRICS & GYNECOLOGY

## 2021-11-02 PROCEDURE — 88305 TISSUE EXAM BY PATHOLOGIST: CPT | Mod: 26,,, | Performed by: PATHOLOGY

## 2021-11-02 PROCEDURE — 77065 DX MAMMO INCL CAD UNI: CPT | Mod: TC,RT

## 2021-11-02 PROCEDURE — 88305 TISSUE EXAM BY PATHOLOGIST: ICD-10-PCS | Mod: 26,,, | Performed by: PATHOLOGY

## 2021-11-02 PROCEDURE — 77065 DX MAMMO INCL CAD UNI: CPT | Mod: 26,RT,, | Performed by: RADIOLOGY

## 2021-11-02 PROCEDURE — 88305 TISSUE EXAM BY PATHOLOGIST: CPT | Performed by: PATHOLOGY

## 2021-11-02 RX ORDER — LIDOCAINE HYDROCHLORIDE 10 MG/ML
3 INJECTION INFILTRATION; PERINEURAL ONCE
Status: COMPLETED | OUTPATIENT
Start: 2021-11-02 | End: 2021-11-02

## 2021-11-02 RX ORDER — LIDOCAINE HYDROCHLORIDE AND EPINEPHRINE 20; 10 MG/ML; UG/ML
10 INJECTION, SOLUTION INFILTRATION; PERINEURAL ONCE
Status: COMPLETED | OUTPATIENT
Start: 2021-11-02 | End: 2021-11-02

## 2021-11-02 RX ADMIN — LIDOCAINE HYDROCHLORIDE,EPINEPHRINE BITARTRATE 10 ML: 20; .01 INJECTION, SOLUTION INFILTRATION; PERINEURAL at 10:11

## 2021-11-02 RX ADMIN — LIDOCAINE HYDROCHLORIDE 3 ML: 10 INJECTION, SOLUTION EPIDURAL; INFILTRATION; INTRACAUDAL; PERINEURAL at 10:11

## 2021-11-03 ENCOUNTER — PATIENT MESSAGE (OUTPATIENT)
Dept: RADIOLOGY | Facility: HOSPITAL | Age: 41
End: 2021-11-03
Payer: COMMERCIAL

## 2021-11-03 LAB
FINAL PATHOLOGIC DIAGNOSIS: NORMAL
GROSS: NORMAL
Lab: NORMAL

## 2021-11-04 ENCOUNTER — TELEPHONE (OUTPATIENT)
Dept: SURGERY | Facility: CLINIC | Age: 41
End: 2021-11-04
Payer: COMMERCIAL

## 2021-11-05 ENCOUNTER — PATIENT MESSAGE (OUTPATIENT)
Dept: INTERNAL MEDICINE | Facility: CLINIC | Age: 41
End: 2021-11-05
Payer: COMMERCIAL

## 2021-11-10 ENCOUNTER — PATIENT MESSAGE (OUTPATIENT)
Dept: RADIOLOGY | Facility: HOSPITAL | Age: 41
End: 2021-11-10
Payer: COMMERCIAL

## 2021-11-18 ENCOUNTER — PATIENT MESSAGE (OUTPATIENT)
Dept: INTERNAL MEDICINE | Facility: CLINIC | Age: 41
End: 2021-11-18
Payer: COMMERCIAL

## 2021-11-30 ENCOUNTER — OFFICE VISIT (OUTPATIENT)
Dept: INTERNAL MEDICINE | Facility: CLINIC | Age: 41
End: 2021-11-30
Payer: COMMERCIAL

## 2021-11-30 VITALS
DIASTOLIC BLOOD PRESSURE: 60 MMHG | OXYGEN SATURATION: 100 % | RESPIRATION RATE: 16 BRPM | BODY MASS INDEX: 31.64 KG/M2 | HEART RATE: 89 BPM | HEIGHT: 63 IN | WEIGHT: 178.56 LBS | SYSTOLIC BLOOD PRESSURE: 110 MMHG

## 2021-11-30 DIAGNOSIS — Z09 FOLLOW-UP EXAM: Primary | ICD-10-CM

## 2021-11-30 DIAGNOSIS — L30.4 INTERTRIGO: ICD-10-CM

## 2021-11-30 DIAGNOSIS — R23.8 SKIN IRRITATION: ICD-10-CM

## 2021-11-30 PROCEDURE — 99212 PR OFFICE/OUTPT VISIT, EST, LEVL II, 10-19 MIN: ICD-10-PCS | Mod: S$GLB,,, | Performed by: PHYSICIAN ASSISTANT

## 2021-11-30 PROCEDURE — 99999 PR PBB SHADOW E&M-EST. PATIENT-LVL IV: ICD-10-PCS | Mod: PBBFAC,,, | Performed by: PHYSICIAN ASSISTANT

## 2021-11-30 PROCEDURE — 99999 PR PBB SHADOW E&M-EST. PATIENT-LVL IV: CPT | Mod: PBBFAC,,, | Performed by: PHYSICIAN ASSISTANT

## 2021-11-30 PROCEDURE — 99212 OFFICE O/P EST SF 10 MIN: CPT | Mod: S$GLB,,, | Performed by: PHYSICIAN ASSISTANT

## 2021-12-01 ENCOUNTER — TELEPHONE (OUTPATIENT)
Dept: OPTOMETRY | Facility: CLINIC | Age: 41
End: 2021-12-01
Payer: COMMERCIAL

## 2021-12-11 ENCOUNTER — IMMUNIZATION (OUTPATIENT)
Dept: INTERNAL MEDICINE | Facility: CLINIC | Age: 41
End: 2021-12-11
Payer: COMMERCIAL

## 2021-12-11 DIAGNOSIS — Z23 NEED FOR VACCINATION: Primary | ICD-10-CM

## 2021-12-11 PROCEDURE — 0004A COVID-19, MRNA, LNP-S, PF, 30 MCG/0.3 ML DOSE VACCINE: CPT | Mod: CV19,PBBFAC | Performed by: INTERNAL MEDICINE

## 2021-12-13 ENCOUNTER — OFFICE VISIT (OUTPATIENT)
Dept: DERMATOLOGY | Facility: CLINIC | Age: 41
End: 2021-12-13
Payer: COMMERCIAL

## 2021-12-13 DIAGNOSIS — Z76.89 ENCOUNTER FOR SKIN CARE: ICD-10-CM

## 2021-12-13 DIAGNOSIS — R20.2 PARESTHESIA: ICD-10-CM

## 2021-12-13 DIAGNOSIS — L21.9 SEBORRHEA: ICD-10-CM

## 2021-12-13 DIAGNOSIS — L30.4 INTERTRIGO: ICD-10-CM

## 2021-12-13 DIAGNOSIS — R79.89 LOW VITAMIN D LEVEL: Primary | ICD-10-CM

## 2021-12-13 PROCEDURE — 99214 OFFICE O/P EST MOD 30 MIN: CPT | Mod: S$GLB,,, | Performed by: DERMATOLOGY

## 2021-12-13 PROCEDURE — 99999 PR PBB SHADOW E&M-EST. PATIENT-LVL III: ICD-10-PCS | Mod: PBBFAC,,, | Performed by: DERMATOLOGY

## 2021-12-13 PROCEDURE — 99214 PR OFFICE/OUTPT VISIT, EST, LEVL IV, 30-39 MIN: ICD-10-PCS | Mod: S$GLB,,, | Performed by: DERMATOLOGY

## 2021-12-13 PROCEDURE — 99999 PR PBB SHADOW E&M-EST. PATIENT-LVL III: CPT | Mod: PBBFAC,,, | Performed by: DERMATOLOGY

## 2021-12-17 ENCOUNTER — PATIENT MESSAGE (OUTPATIENT)
Dept: DERMATOLOGY | Facility: CLINIC | Age: 41
End: 2021-12-17
Payer: COMMERCIAL

## 2021-12-24 NOTE — TELEPHONE ENCOUNTER
Please advise on mychart refill request:    Obi Kwong would like a refill of the following medications:        ketorolac (TORADOL) 10 mg tablet [Elli Irwin MD]    Preferred pharmacy: [facility] [address] [place] [address] [address]      FYI:  Last routine GYN exam 7/17/17, pap WNL.   Last refill for Toradol 10 mg #18 R-0 was sent on 6/14/2017.        Milad

## 2022-02-07 ENCOUNTER — OFFICE VISIT (OUTPATIENT)
Dept: OPTOMETRY | Facility: CLINIC | Age: 42
End: 2022-02-07
Payer: COMMERCIAL

## 2022-02-07 DIAGNOSIS — H52.03 HYPEROPIA OF BOTH EYES: Primary | ICD-10-CM

## 2022-02-07 PROCEDURE — 1159F MED LIST DOCD IN RCRD: CPT | Mod: CPTII,S$GLB,, | Performed by: OPTOMETRIST

## 2022-02-07 PROCEDURE — 99999 PR PBB SHADOW E&M-EST. PATIENT-LVL III: ICD-10-PCS | Mod: PBBFAC,,, | Performed by: OPTOMETRIST

## 2022-02-07 PROCEDURE — 92004 PR EYE EXAM, NEW PATIENT,COMPREHESV: ICD-10-PCS | Mod: S$GLB,,, | Performed by: OPTOMETRIST

## 2022-02-07 PROCEDURE — 92015 DETERMINE REFRACTIVE STATE: CPT | Mod: S$GLB,,, | Performed by: OPTOMETRIST

## 2022-02-07 PROCEDURE — 1159F PR MEDICATION LIST DOCUMENTED IN MEDICAL RECORD: ICD-10-PCS | Mod: CPTII,S$GLB,, | Performed by: OPTOMETRIST

## 2022-02-07 PROCEDURE — 99999 PR PBB SHADOW E&M-EST. PATIENT-LVL III: CPT | Mod: PBBFAC,,, | Performed by: OPTOMETRIST

## 2022-02-07 PROCEDURE — 92004 COMPRE OPH EXAM NEW PT 1/>: CPT | Mod: S$GLB,,, | Performed by: OPTOMETRIST

## 2022-02-07 PROCEDURE — 92015 PR REFRACTION: ICD-10-PCS | Mod: S$GLB,,, | Performed by: OPTOMETRIST

## 2022-02-07 NOTE — PROGRESS NOTES
HPI     Yanna Kwong is a 41 y.o. female who returns,  for continued eye care.   Yanna was last seen on 10/01/2018. At that time she was noted of having   latent hyperopia with early signs of presbyopia. Glasses were given to   help with this. Today, she states she does have issues with her vision   sometimes.     (+)blurred vision- occassionally  (--)Headaches  (--)diplopia  (--)flashes  (--)floaters  (--)pain  (--)Itching  (--)tearing  (--)burning  (--)Dryness  (--) OTC Drops  (--)Photophobia          Last edited by SILVANA Manjarrez on 2/7/2022  2:51 PM. (History)        Review of Systems   Constitutional: Negative for chills, fever and malaise/fatigue.   HENT: Negative for congestion, hearing loss and sore throat.    Eyes: Negative for blurred vision, double vision, photophobia, pain, discharge and redness.   Respiratory: Negative.  Negative for cough, shortness of breath and wheezing.    Cardiovascular: Negative.    Gastrointestinal: Negative.  Negative for nausea and vomiting.   Genitourinary: Negative.    Musculoskeletal: Negative.    Skin: Negative.    Neurological: Negative for seizures.   Psychiatric/Behavioral: Negative.        For exam results, see encounter report    Assessment /Plan     1. Bilateral latent hyperopia --> stable  - Partial Spec Rx per final Rx below for use with computers (ok to wear full time)  Glasses Prescription (2/7/2022)        Sphere Cylinder    Right +0.75 Sphere    Left +0.75 Sphere    Type: SVL    Expiration Date: 2/8/2023        2. Good ocular health    Patient education; RTC in 1 year, sooner as needed at [facility]

## 2022-02-07 NOTE — PATIENT INSTRUCTIONS
Hyperopia (Farsightedness)      Farsightedness, or hyperopia, as it is medically termed, is a vision condition in which distant objects are usually seen clearly, but close ones do not come into proper focus. Farsightedness occurs if your eyeball is too short or the cornea has too little curvature, so light entering your eye is not focused correctly.  Common signs of farsightedness include difficulty in concentrating and maintaining a clear focus on near objects, eye strain, fatigue and/or headaches after close work, aching or burning eyes, irritability or nervousness after sustained concentration.  Common vision screenings, often done in schools, are generally ineffective in detecting farsightedness. A comprehensive optometric examination will include testing for farsightedness.  In mild cases of farsightedness, your eyes may be able to compensate without corrective lenses. In other cases, your optometrist can prescribe eyeglasses or contact lenses to optically correct farsightedness by altering the way the light enters your eyes      Courtesy of the [employer] Association    Presbyopia      Presbyopia is a vision condition in which the crystalline lens of your eye loses its flexibility, which makes it difficult for you to focus on close objects.  Presbyopia may seem to occur suddenly, but the actual loss of flexibility takes place over a number of years. Presbyopia usually becomes noticeable in the early to mid-40s. Presbyopia is a natural part of the aging process of the eye. It is not a disease, and it cannot be prevented.  Some signs of presbyopia include the tendency to hold reading materials at arm's length, blurred vision at normal reading distance and eye fatigue along with headaches when doing close work. A comprehensive optometric examination will include testing for presbyopia.  To help you compensate for presbyopia, your optometrist can prescribe reading glasses, bifocals, trifocals or contact  lenses. Because presbyopia can complicate other common vision conditions like nearsightedness, farsightedness and astigmatism, your optometrist will determine the specific lenses to allow you to see clearly and comfortably. You may only need to wear your glasses for close work like reading, but you may find that wearing them all the time is more convenient and beneficial for your vision needs.  Because the effects of presbyopia continue to change the ability of the crystalline lens to focus properly, periodic changes in your eyewear may be necessary to maintain clear and comfortable vision      Courtesy of the American [employer] Association

## 2022-02-21 ENCOUNTER — OFFICE VISIT (OUTPATIENT)
Dept: DERMATOLOGY | Facility: CLINIC | Age: 42
End: 2022-02-21
Payer: COMMERCIAL

## 2022-02-21 DIAGNOSIS — L30.9 DERMATITIS: ICD-10-CM

## 2022-02-21 DIAGNOSIS — L81.9 DYSCHROMIA: ICD-10-CM

## 2022-02-21 DIAGNOSIS — L70.0 ACNE VULGARIS: Primary | ICD-10-CM

## 2022-02-21 PROCEDURE — 1160F RVW MEDS BY RX/DR IN RCRD: CPT | Mod: CPTII,S$GLB,, | Performed by: DERMATOLOGY

## 2022-02-21 PROCEDURE — 1160F PR REVIEW ALL MEDS BY PRESCRIBER/CLIN PHARMACIST DOCUMENTED: ICD-10-PCS | Mod: CPTII,S$GLB,, | Performed by: DERMATOLOGY

## 2022-02-21 PROCEDURE — 99214 OFFICE O/P EST MOD 30 MIN: CPT | Mod: S$GLB,,, | Performed by: DERMATOLOGY

## 2022-02-21 PROCEDURE — 1159F PR MEDICATION LIST DOCUMENTED IN MEDICAL RECORD: ICD-10-PCS | Mod: CPTII,S$GLB,, | Performed by: DERMATOLOGY

## 2022-02-21 PROCEDURE — 99999 PR PBB SHADOW E&M-EST. PATIENT-LVL III: ICD-10-PCS | Mod: PBBFAC,,, | Performed by: DERMATOLOGY

## 2022-02-21 PROCEDURE — 99214 PR OFFICE/OUTPT VISIT, EST, LEVL IV, 30-39 MIN: ICD-10-PCS | Mod: S$GLB,,, | Performed by: DERMATOLOGY

## 2022-02-21 PROCEDURE — 1159F MED LIST DOCD IN RCRD: CPT | Mod: CPTII,S$GLB,, | Performed by: DERMATOLOGY

## 2022-02-21 PROCEDURE — 99999 PR PBB SHADOW E&M-EST. PATIENT-LVL III: CPT | Mod: PBBFAC,,, | Performed by: DERMATOLOGY

## 2022-02-21 RX ORDER — TRETINOIN 0.25 MG/G
CREAM TOPICAL
Qty: 45 G | Refills: 6 | Status: SHIPPED | OUTPATIENT
Start: 2022-02-21

## 2022-02-21 NOTE — PROGRESS NOTES
Subjective:       Patient ID:  Yanna Kwong is a 42 y.o. female who presents for   Chief Complaint   Patient presents with    Dry Skin    Acne     Pt here today with sensitivity of both upper legs /buttock/vulva- was treated by her PCP for intertrigo with Zeasorb AF powder, triamcinolone , nystatin cream; feels a stinging burning sensation.   Pt saw Dr Frazier on 12/13/21 for this, states moderate improvement since increasing Vit D  C/o lighter areas on legs after she went to a cryo chamber over 2 years ago. Has improved but still present    Dry Skin - Follow-up  Symptom course: improving  Affected locations: left upper leg and right upper leg  Signs / symptoms: dryness and burning  Severity: mild to moderate    Acne - Follow-up  Symptom course: unchanged  Affected locations: nose  Signs / symptoms: redness  Severity: mild        Review of Systems   Constitutional: Negative for fever and chills.   Respiratory: Negative for cough and shortness of breath.    Gastrointestinal: Negative for nausea and vomiting.   Musculoskeletal: Negative for joint swelling and arthralgias.   Skin: Positive for dry skin. Negative for daily sunscreen use, activity-related sunscreen use, recent sunburn and wears hat.   All other systems reviewed and are negative.  Hematologic/Lymphatic: Does not bruise/bleed easily.        Objective:    Physical Exam   Constitutional: She appears well-developed and well-nourished.   Eyes: No conjunctival no injection.   Neurological: She is alert and oriented to person, place, and time.   Psychiatric: She has a normal mood and affect.   Skin:   Areas Examined (abnormalities noted in diagram):   Head / Face Inspection Performed  RLE Inspected  LLE Inspection Performed                   Diagram Legend     Erythematous scaling macule/papule c/w actinic keratosis       Vascular papule c/w angioma      Pigmented verrucoid papule/plaque c/w seborrheic keratosis      Yellow umbilicated papule c/w sebaceous  hyperplasia      Irregularly shaped tan macule c/w lentigo     1-2 mm smooth white papules consistent with [place]      Movable subcutaneous cyst with punctum c/w epidermal inclusion cyst      Subcutaneous movable cyst c/w pilar cyst      Firm pink to brown papule c/w dermatofibroma      Pedunculated fleshy papule(s) c/w skin tag(s)      Evenly pigmented macule c/w junctional nevus     Mildly variegated pigmented, slightly irregular-bordered macule c/w mildly atypical nevus      Flesh colored to evenly pigmented papule c/w intradermal nevus       Pink pearly papule/plaque c/w basal cell carcinoma      Erythematous hyperkeratotic cursted plaque c/w SCC      Surgical scar with no sign of skin cancer recurrence      Open and closed comedones      Inflammatory papules and pustules      Verrucoid papule consistent consistent with wart     Erythematous eczematous patches and plaques     Dystrophic onycholytic nail with subungual debris c/w onychomycosis     Umbilicated papule    Erythematous-base heme-crusted tan verrucoid plaque consistent with inflamed seborrheic keratosis     Erythematous Silvery Scaling Plaque c/w Psoriasis     See annotation      Assessment / Plan:        Acne vulgaris  -     tretinoin (RETIN-A) 0.025 % cream; Apply thin film to face qhs then moisturize  Dispense: 45 g; Refill: 6  Sun protection qam   Retinoid handout provided    Dermatitis  May be exacerbated by weather changes, stress, back related issues  Discussed with patient good skin care regimen including avoiding fragranced products and very hot showers.  Recommended dove sensitive skin bar soap or cerave hydrating cleanser or bar.  Recommend Cerave cream  For moisturization daily -2x daily.   Triamcinolone cream as needed  vaseline bid nightly for barrier repair     DYSCHROMIA  Reassurance.          Follow up if symptoms worsen or fail to improve.

## 2022-02-21 NOTE — PATIENT INSTRUCTIONS
RETINOIDS   Your Doctor has prescribed a topical retinoid for your skin.  A retinoid is a vitamin A derived product used to treat a variety of skin conditions including acne, actinic keratoses (pre-skin cancers), uneven pigmentation from sun damage, fine lines and wrinkles, and enlarged pores.      How do they work?   Retinoids increase skin cell turn over from the normal 30 days to five or six days, minimizing clogged pores- the major factor in acne.  Retinoids can also repair the DNA in cells damaged by the sun helping to even out skin pigmentation and clear pre-skin cancers.  They stimulate collagen remodeling and repair, reversing signs of maturing skin.   They can shrink oil glands and minimize the appearance of large pores.   These effects can not be appreciated unless the medication is used on a consistent basis!    How do I use a retinoid?   After washing with a mild cleanser (Cerave hydrating cleanser, Revision skin brightening wash, Vanicream daily facial cleanser), a thin layer of medication is applied to the forehead, nose, cheeks, and chin (and around eyes if treating fine wrinkles) at night.  Immediately after, the skin should be moisturized with a non-retinol containing moisturizer such as Cerave PM or vanicream daily facial moisturizer. The amount of medication needed to cover the entire face should be no more than the size of a green pea.   Irritation around the eyes can be treated with Vaseline at night.     What if my skin appears dry, red, and is peeling?   Retinoids do not cause dry skin but rather they cause the top layer of the skin to shed, giving an appearance of dry skin.  In fact, new healthy skin cells are replacing the older, damaged cells on the surface. This usually occurs the first 2-4 weeks as the skin is adjusting to the medication.  It is reasonable to use the medication every other night or even every two nights until your skin adjusts.  You can use a MILD exfoliant to remove the  peeling skin (Aveeno daily clarifying pads or other) and can apply a moisturizer throughout the day as needed. You can also try moisturizing the face first then applying the retinoid after. Retinoids come in a variety of strengths and vehicles and your doctor can find one best for you.  If you cannot tolerate prescription strength retinoids, over the counter products with retinol may be beneficial (Olay ProX wrinkle cream, STACIE deep wrinkle cream, Green Cream at [employer])    Will my skin be more sensitive in the sun?   You will need to use a sunscreen with SPF 30 daily.  Retinoids will cause the outermost layer of the skin to be thinner and thus more sensitive to ultraviolet rays.  However, remember that over time, retinoids actually make the skin thicker by enhancing collagen deposition which protects the skin from sun damage.     When will I see results?   If you are using a retinoid for acne, you should see improvement in 6-8 weeks.  Do not be alarmed if you find that your acne gets WORSE before it gets better- KEEP USING THE MEDICATION- this is a normal response and your acne will improve if you can stick with it.     If you are using the medication for anti-aging and skin dyspigmentation, you may see results in 3 months, but most effects are not visible until 6 months.  Retinoids are clinically proven to reverse signs of aging, but only if used on a CONSISTENT BASIS!     Remember that retinoids should not be used if you are pregnant.  Discontinue use 1 week prior to waxing, as skin is more likely to tear.

## 2022-03-11 ENCOUNTER — PATIENT MESSAGE (OUTPATIENT)
Dept: DERMATOLOGY | Facility: CLINIC | Age: 42
End: 2022-03-11
Payer: COMMERCIAL

## 2022-04-18 ENCOUNTER — PATIENT MESSAGE (OUTPATIENT)
Dept: INTERNAL MEDICINE | Facility: CLINIC | Age: 42
End: 2022-04-18
Payer: COMMERCIAL

## 2022-04-18 DIAGNOSIS — Z11.1 SCREENING-PULMONARY TB: Primary | ICD-10-CM

## 2022-04-18 DIAGNOSIS — Z01.84 IMMUNITY STATUS TESTING: Primary | ICD-10-CM

## 2022-04-18 NOTE — LETTER
April 18, 2022    Yanna Kwong  [address] [facility] [address]       [facility]  [address]  [facility] [address]  Phone: [phone]  Fax: [phone] Dear Ms. Suttonie:    Below are your results from your MMR titer from 2015:  Results  Collected Updated Procedure    06/13/2015 0800 06/15/2015 1518 Mumps, IgG Screen [[id number]]   (Abnormal)   Blood    Component Value Units   Mumps IgG Screen 1.65 High  ISR   Mumps IgG Interpretation Positive Abnormal             06/13/2015 0800 06/15/2015 1518 Rubeola antibody IgG [[id number]]   (Abnormal)   Blood    Component Value Units   Rubeola IgG 2.34 High  ISR   Rubeola Interpretation Positive Abnormal             06/13/2015 0800 06/15/2015 0944 Rubella antibody, IgG [[id number]]   (Abnormal)   Blood    Component Value Units   Rubella IgG Antibodies 32.1 High  IU/mL   Rubella Immune Status Reactive                Sincerely,      Opal Gibson PA-C

## 2022-04-22 ENCOUNTER — LAB VISIT (OUTPATIENT)
Dept: LAB | Facility: HOSPITAL | Age: 42
End: 2022-04-22
Attending: PHYSICIAN ASSISTANT
Payer: COMMERCIAL

## 2022-04-22 ENCOUNTER — CLINICAL SUPPORT (OUTPATIENT)
Dept: INTERNAL MEDICINE | Facility: CLINIC | Age: 42
End: 2022-04-22
Payer: COMMERCIAL

## 2022-04-22 ENCOUNTER — PATIENT MESSAGE (OUTPATIENT)
Dept: INTERNAL MEDICINE | Facility: CLINIC | Age: 42
End: 2022-04-22

## 2022-04-22 DIAGNOSIS — Z01.84 IMMUNITY STATUS TESTING: Primary | ICD-10-CM

## 2022-04-22 DIAGNOSIS — Z01.84 IMMUNITY STATUS TESTING: ICD-10-CM

## 2022-04-22 PROCEDURE — 86580 POCT TB SKIN TEST: ICD-10-PCS | Mod: S$GLB,,, | Performed by: INTERNAL MEDICINE

## 2022-04-22 PROCEDURE — 86580 TB INTRADERMAL TEST: CPT | Mod: S$GLB,,, | Performed by: INTERNAL MEDICINE

## 2022-04-22 PROCEDURE — 36415 COLL VENOUS BLD VENIPUNCTURE: CPT | Performed by: PHYSICIAN ASSISTANT

## 2022-04-22 PROCEDURE — 86787 VARICELLA-ZOSTER ANTIBODY: CPT | Performed by: PHYSICIAN ASSISTANT

## 2022-04-22 PROCEDURE — 86706 HEP B SURFACE ANTIBODY: CPT | Performed by: PHYSICIAN ASSISTANT

## 2022-04-22 NOTE — PROGRESS NOTES
2 pt identifiers used. TB Blurb placed on left arm. Pt advised to return between 48-72 hrs for ppd reading.     
Attending Attestation (For Attendings USE Only)...

## 2022-04-24 LAB
VARICELLA INTERPRETATION: POSITIVE
VARICELLA ZOSTER IGG: 3.57 ISR (ref 0–0.9)

## 2022-04-25 ENCOUNTER — CLINICAL SUPPORT (OUTPATIENT)
Dept: INTERNAL MEDICINE | Facility: CLINIC | Age: 42
End: 2022-04-25
Payer: COMMERCIAL

## 2022-04-25 ENCOUNTER — PATIENT MESSAGE (OUTPATIENT)
Dept: INTERNAL MEDICINE | Facility: CLINIC | Age: 42
End: 2022-04-25

## 2022-04-25 ENCOUNTER — PATIENT MESSAGE (OUTPATIENT)
Dept: PHARMACY | Facility: CLINIC | Age: 42
End: 2022-04-25
Payer: COMMERCIAL

## 2022-04-25 LAB
HBV SURFACE AB SER QL IA: NEGATIVE
HBV SURFACE AB SERPL IA-ACNC: <3 MIU/ML
TB INDURATION - 48 HR READ: 0 MM
TB INDURATION - 72 HR READ: 0 MM
TB SKIN TEST - 48 HR READ: NEGATIVE
TB SKIN TEST - 72 HR READ: NEGATIVE

## 2022-04-25 NOTE — PROGRESS NOTES
"2 pt identifiers used. PPD test read, negative. Pt results report printed for pt. Read By" Valeri MEADE LPN  "

## 2022-04-26 ENCOUNTER — PATIENT MESSAGE (OUTPATIENT)
Dept: INTERNAL MEDICINE | Facility: CLINIC | Age: 42
End: 2022-04-26
Payer: COMMERCIAL

## 2022-05-03 ENCOUNTER — CLINICAL SUPPORT (OUTPATIENT)
Dept: INTERNAL MEDICINE | Facility: CLINIC | Age: 42
End: 2022-05-03
Payer: COMMERCIAL

## 2022-05-03 DIAGNOSIS — Z11.1 SCREENING-PULMONARY TB: ICD-10-CM

## 2022-05-03 DIAGNOSIS — Z01.84 IMMUNITY STATUS TESTING: Primary | ICD-10-CM

## 2022-05-03 PROCEDURE — 86580 POCT TB SKIN TEST: ICD-10-PCS | Mod: S$GLB,,, | Performed by: PHYSICIAN ASSISTANT

## 2022-05-03 PROCEDURE — 86580 TB INTRADERMAL TEST: CPT | Mod: S$GLB,,, | Performed by: PHYSICIAN ASSISTANT

## 2022-05-05 ENCOUNTER — CLINICAL SUPPORT (OUTPATIENT)
Dept: INTERNAL MEDICINE | Facility: CLINIC | Age: 42
End: 2022-05-05
Payer: COMMERCIAL

## 2022-05-05 ENCOUNTER — PATIENT MESSAGE (OUTPATIENT)
Dept: INTERNAL MEDICINE | Facility: CLINIC | Age: 42
End: 2022-05-05

## 2022-05-05 LAB
TB INDURATION - 48 HR READ: 0 MM
TB INDURATION - 72 HR READ: NORMAL
TB SKIN TEST - 48 HR READ: NEGATIVE
TB SKIN TEST - 72 HR READ: NORMAL

## 2022-06-03 ENCOUNTER — TELEPHONE (OUTPATIENT)
Dept: RADIOLOGY | Facility: HOSPITAL | Age: 42
End: 2022-06-03
Payer: COMMERCIAL

## 2022-06-03 NOTE — TELEPHONE ENCOUNTER
----- Message from Adina Avalos MA sent at 6/3/2022 12:59 PM CDT -----    ----- Message -----  From: Tabitha Sun  Sent: 6/3/2022  11:56 AM CDT  To: , Jamey Kwong calling regarding Appointment Access  (message) for [facility] with [facility]  [phone]

## 2022-06-13 ENCOUNTER — HOSPITAL ENCOUNTER (OUTPATIENT)
Dept: RADIOLOGY | Facility: HOSPITAL | Age: 42
Discharge: HOME | End: 2022-06-13
Attending: OBSTETRICS & GYNECOLOGY
Payer: COMMERCIAL

## 2022-06-13 VITALS — BODY MASS INDEX: 32.42 KG/M2 | WEIGHT: 183 LBS

## 2022-06-13 DIAGNOSIS — R92.8 ABNORMAL FINDING ON BREAST IMAGING: ICD-10-CM

## 2022-06-13 PROCEDURE — 77066 DX MAMMO INCL CAD BI: CPT | Mod: 26,,, | Performed by: RADIOLOGY

## 2022-06-13 PROCEDURE — 77066 MAMMO DIGITAL DIAGNOSTIC BILAT WITH TOMO: ICD-10-PCS | Mod: 26,,, | Performed by: RADIOLOGY

## 2022-06-13 PROCEDURE — 77066 DX MAMMO INCL CAD BI: CPT | Mod: TC

## 2022-06-13 PROCEDURE — 77062 BREAST TOMOSYNTHESIS BI: CPT | Mod: 26,,, | Performed by: RADIOLOGY

## 2022-06-13 PROCEDURE — 77062 MAMMO DIGITAL DIAGNOSTIC BILAT WITH TOMO: ICD-10-PCS | Mod: 26,,, | Performed by: RADIOLOGY

## 2022-06-14 ENCOUNTER — CLINICAL SUPPORT (OUTPATIENT)
Dept: OTHER | Facility: CLINIC | Age: 42
End: 2022-06-14
Payer: COMMERCIAL

## 2022-06-14 DIAGNOSIS — Z00.8 ENCOUNTER FOR OTHER GENERAL EXAMINATION: ICD-10-CM

## 2022-06-15 VITALS
DIASTOLIC BLOOD PRESSURE: 70 MMHG | HEIGHT: 63 IN | BODY MASS INDEX: 32.43 KG/M2 | SYSTOLIC BLOOD PRESSURE: 116 MMHG | WEIGHT: 183 LBS

## 2022-06-15 LAB
GLUCOSE SERPL-MCNC: 84 MG/DL (ref 60–140)
HDLC SERPL-MCNC: 46 MG/DL
POC CHOLESTEROL, LDL (DOCK): 114.99 MG/DL
POC CHOLESTEROL, TOTAL: 173 MG/DL
TRIGL SERPL-MCNC: 62 MG/DL

## 2022-06-29 ENCOUNTER — PATIENT MESSAGE (OUTPATIENT)
Dept: INTERNAL MEDICINE | Facility: CLINIC | Age: 42
End: 2022-06-29
Payer: COMMERCIAL

## 2022-07-18 ENCOUNTER — PATIENT MESSAGE (OUTPATIENT)
Dept: INTERNAL MEDICINE | Facility: CLINIC | Age: 42
End: 2022-07-18
Payer: COMMERCIAL

## 2022-07-20 ENCOUNTER — PATIENT MESSAGE (OUTPATIENT)
Dept: INTERNAL MEDICINE | Facility: CLINIC | Age: 42
End: 2022-07-20
Payer: COMMERCIAL

## 2022-07-22 ENCOUNTER — OFFICE VISIT (OUTPATIENT)
Dept: INTERNAL MEDICINE | Facility: CLINIC | Age: 42
End: 2022-07-22
Payer: COMMERCIAL

## 2022-07-22 VITALS
OXYGEN SATURATION: 95 % | WEIGHT: 185.44 LBS | DIASTOLIC BLOOD PRESSURE: 78 MMHG | RESPIRATION RATE: 16 BRPM | SYSTOLIC BLOOD PRESSURE: 112 MMHG | HEART RATE: 78 BPM | HEIGHT: 64 IN | BODY MASS INDEX: 31.66 KG/M2

## 2022-07-22 DIAGNOSIS — Z86.16 HISTORY OF COVID-19: Primary | ICD-10-CM

## 2022-07-22 PROCEDURE — 1160F RVW MEDS BY RX/DR IN RCRD: CPT | Mod: CPTII,S$GLB,, | Performed by: PHYSICIAN ASSISTANT

## 2022-07-22 PROCEDURE — 99213 OFFICE O/P EST LOW 20 MIN: CPT | Mod: S$GLB,,, | Performed by: PHYSICIAN ASSISTANT

## 2022-07-22 PROCEDURE — 99213 PR OFFICE/OUTPT VISIT, EST, LEVL III, 20-29 MIN: ICD-10-PCS | Mod: S$GLB,,, | Performed by: PHYSICIAN ASSISTANT

## 2022-07-22 PROCEDURE — 1160F PR REVIEW ALL MEDS BY PRESCRIBER/CLIN PHARMACIST DOCUMENTED: ICD-10-PCS | Mod: CPTII,S$GLB,, | Performed by: PHYSICIAN ASSISTANT

## 2022-07-22 PROCEDURE — 3078F DIAST BP <80 MM HG: CPT | Mod: CPTII,S$GLB,, | Performed by: PHYSICIAN ASSISTANT

## 2022-07-22 PROCEDURE — 3078F PR MOST RECENT DIASTOLIC BLOOD PRESSURE < 80 MM HG: ICD-10-PCS | Mod: CPTII,S$GLB,, | Performed by: PHYSICIAN ASSISTANT

## 2022-07-22 PROCEDURE — 3074F PR MOST RECENT SYSTOLIC BLOOD PRESSURE < 130 MM HG: ICD-10-PCS | Mod: CPTII,S$GLB,, | Performed by: PHYSICIAN ASSISTANT

## 2022-07-22 PROCEDURE — 1159F PR MEDICATION LIST DOCUMENTED IN MEDICAL RECORD: ICD-10-PCS | Mod: CPTII,S$GLB,, | Performed by: PHYSICIAN ASSISTANT

## 2022-07-22 PROCEDURE — 3008F BODY MASS INDEX DOCD: CPT | Mod: CPTII,S$GLB,, | Performed by: PHYSICIAN ASSISTANT

## 2022-07-22 PROCEDURE — 99999 PR PBB SHADOW E&M-EST. PATIENT-LVL V: ICD-10-PCS | Mod: PBBFAC,,, | Performed by: PHYSICIAN ASSISTANT

## 2022-07-22 PROCEDURE — 3008F PR BODY MASS INDEX (BMI) DOCUMENTED: ICD-10-PCS | Mod: CPTII,S$GLB,, | Performed by: PHYSICIAN ASSISTANT

## 2022-07-22 PROCEDURE — 99999 PR PBB SHADOW E&M-EST. PATIENT-LVL V: CPT | Mod: PBBFAC,,, | Performed by: PHYSICIAN ASSISTANT

## 2022-07-22 PROCEDURE — 1159F MED LIST DOCD IN RCRD: CPT | Mod: CPTII,S$GLB,, | Performed by: PHYSICIAN ASSISTANT

## 2022-07-22 PROCEDURE — 3074F SYST BP LT 130 MM HG: CPT | Mod: CPTII,S$GLB,, | Performed by: PHYSICIAN ASSISTANT

## 2022-07-22 NOTE — PROGRESS NOTES
"Subjective:       Patient ID: Yanna Kwong is a 42 y.o. female.    Chief Complaint: Follow-up    HPI     Established pt of Lexie Chisholm MD     Tested positive 6/29  continue to test positive on home test until 7/15    She is feeling better. Went for a walk yesterday. Still with occ cough, pnd and throat irritation. Using cough drops as needed.     During height of symptoms she tried mucinex, flonase, claritin, none recently this past week.     Past Medical History:   Diagnosis Date    Thyroid nodule      Social History     Tobacco Use    Smoking status: Never Smoker    Smokeless tobacco: Never Used   Substance Use Topics    Alcohol use: No    Drug use: Never     Review of patient's allergies indicates:   Allergen Reactions    Vicodin [hydrocodone-acetaminophen] Other (See Comments)     tongue    Erythromycin Itching and Rash    Ilosone Rash        Review of Systems   Constitutional: Positive for fatigue. Negative for chills and diaphoresis.   HENT: Positive for postnasal drip. Negative for trouble swallowing.    Respiratory: Positive for cough. Negative for shortness of breath and wheezing.    Cardiovascular: Negative for chest pain and leg swelling.   Gastrointestinal: Negative for abdominal pain, nausea and vomiting.         Objective: /78 (BP Location: Right arm, Patient Position: Sitting, BP Method: Large (Manual))   Pulse 78   Resp 16   Ht 5' 4" (1.626 m)   Wt 84.1 kg (185 lb 6.5 oz)   SpO2 95%   BMI 31.83 kg/m²         Physical Exam  Vitals reviewed.   Constitutional:       General: She is not in acute distress.     Appearance: She is well-developed.   HENT:      Head: Normocephalic and atraumatic.      Right Ear: Tympanic membrane, ear canal and external ear normal.      Left Ear: Tympanic membrane, ear canal and external ear normal.      Nose: Congestion present.   Cardiovascular:      Rate and Rhythm: Normal rate and regular rhythm.      Heart sounds: No murmur heard.  Pulmonary: "      Effort: Pulmonary effort is normal.      Breath sounds: Normal breath sounds. No wheezing or rales.   Abdominal:      General: Bowel sounds are normal.      Palpations: Abdomen is soft.      Tenderness: There is no abdominal tenderness.   Skin:     General: Skin is warm and dry.      Findings: No rash.   Neurological:      Mental Status: She is alert.         Assessment:       Problem List Items Addressed This Visit    None     Visit Diagnoses     History of COVID-19    -  Primary          Plan:       Yanna was seen today for follow-up.    Diagnoses and all orders for this visit:    History of COVID-19    Improving  Continue flonase and Claritin for the next 1 to 2 weeks  RTC prn    Opal Gibson PA-C

## 2023-01-02 ENCOUNTER — OFFICE VISIT (OUTPATIENT)
Dept: URGENT CARE | Facility: CLINIC | Age: 43
End: 2023-01-02
Payer: COMMERCIAL

## 2023-01-02 VITALS
WEIGHT: 187 LBS | HEIGHT: 63 IN | TEMPERATURE: 98 F | OXYGEN SATURATION: 99 % | BODY MASS INDEX: 33.13 KG/M2 | SYSTOLIC BLOOD PRESSURE: 107 MMHG | HEART RATE: 74 BPM | DIASTOLIC BLOOD PRESSURE: 70 MMHG | RESPIRATION RATE: 17 BRPM

## 2023-01-02 DIAGNOSIS — Z20.822 COVID-19 VIRUS NOT DETECTED: ICD-10-CM

## 2023-01-02 DIAGNOSIS — Z20.822 EXPOSURE TO COVID-19 VIRUS: ICD-10-CM

## 2023-01-02 DIAGNOSIS — J06.9 VIRAL URI: ICD-10-CM

## 2023-01-02 DIAGNOSIS — R05.9 COUGH, UNSPECIFIED TYPE: Primary | ICD-10-CM

## 2023-01-02 DIAGNOSIS — Z20.822 SUSPECTED COVID-19 VIRUS INFECTION: ICD-10-CM

## 2023-01-02 LAB
CTP QC/QA: YES
CTP QC/QA: YES
FLUAV AG NPH QL: NEGATIVE
FLUBV AG NPH QL: NEGATIVE
SARS-COV-2 AG RESP QL IA.RAPID: NEGATIVE

## 2023-01-02 PROCEDURE — 99204 PR OFFICE/OUTPT VISIT, NEW, LEVL IV, 45-59 MIN: ICD-10-PCS | Mod: S$GLB,,, | Performed by: NURSE PRACTITIONER

## 2023-01-02 PROCEDURE — 3078F PR MOST RECENT DIASTOLIC BLOOD PRESSURE < 80 MM HG: ICD-10-PCS | Mod: CPTII,S$GLB,, | Performed by: NURSE PRACTITIONER

## 2023-01-02 PROCEDURE — 3078F DIAST BP <80 MM HG: CPT | Mod: CPTII,S$GLB,, | Performed by: NURSE PRACTITIONER

## 2023-01-02 PROCEDURE — 87811 SARS CORONAVIRUS 2 ANTIGEN POCT, MANUAL READ: ICD-10-PCS | Mod: QW,S$GLB,, | Performed by: NURSE PRACTITIONER

## 2023-01-02 PROCEDURE — 1159F MED LIST DOCD IN RCRD: CPT | Mod: CPTII,S$GLB,, | Performed by: NURSE PRACTITIONER

## 2023-01-02 PROCEDURE — 99204 OFFICE O/P NEW MOD 45 MIN: CPT | Mod: S$GLB,,, | Performed by: NURSE PRACTITIONER

## 2023-01-02 PROCEDURE — 1160F PR REVIEW ALL MEDS BY PRESCRIBER/CLIN PHARMACIST DOCUMENTED: ICD-10-PCS | Mod: CPTII,S$GLB,, | Performed by: NURSE PRACTITIONER

## 2023-01-02 PROCEDURE — 3008F PR BODY MASS INDEX (BMI) DOCUMENTED: ICD-10-PCS | Mod: CPTII,S$GLB,, | Performed by: NURSE PRACTITIONER

## 2023-01-02 PROCEDURE — 3008F BODY MASS INDEX DOCD: CPT | Mod: CPTII,S$GLB,, | Performed by: NURSE PRACTITIONER

## 2023-01-02 PROCEDURE — 87804 INFLUENZA ASSAY W/OPTIC: CPT | Mod: 59,QW,, | Performed by: NURSE PRACTITIONER

## 2023-01-02 PROCEDURE — 1159F PR MEDICATION LIST DOCUMENTED IN MEDICAL RECORD: ICD-10-PCS | Mod: CPTII,S$GLB,, | Performed by: NURSE PRACTITIONER

## 2023-01-02 PROCEDURE — 87804 POCT INFLUENZA A/B: ICD-10-PCS | Mod: 59,QW,, | Performed by: NURSE PRACTITIONER

## 2023-01-02 PROCEDURE — 1160F RVW MEDS BY RX/DR IN RCRD: CPT | Mod: CPTII,S$GLB,, | Performed by: NURSE PRACTITIONER

## 2023-01-02 PROCEDURE — 3074F PR MOST RECENT SYSTOLIC BLOOD PRESSURE < 130 MM HG: ICD-10-PCS | Mod: CPTII,S$GLB,, | Performed by: NURSE PRACTITIONER

## 2023-01-02 PROCEDURE — 3074F SYST BP LT 130 MM HG: CPT | Mod: CPTII,S$GLB,, | Performed by: NURSE PRACTITIONER

## 2023-01-02 PROCEDURE — 87811 SARS-COV-2 COVID19 W/OPTIC: CPT | Mod: QW,S$GLB,, | Performed by: NURSE PRACTITIONER

## 2023-01-02 RX ORDER — FLUTICASONE PROPIONATE 50 MCG
1 SPRAY, SUSPENSION (ML) NASAL DAILY
Qty: 15.8 ML | Refills: 0 | Status: SHIPPED | OUTPATIENT
Start: 2023-01-02 | End: 2023-03-31

## 2023-01-02 RX ORDER — BENZONATATE 100 MG/1
100 CAPSULE ORAL 3 TIMES DAILY PRN
Qty: 30 CAPSULE | Refills: 0 | Status: SHIPPED | OUTPATIENT
Start: 2023-01-02 | End: 2023-01-12

## 2023-01-02 RX ORDER — PROMETHAZINE HYDROCHLORIDE AND DEXTROMETHORPHAN HYDROBROMIDE 6.25; 15 MG/5ML; MG/5ML
5 SYRUP ORAL EVERY 4 HOURS PRN
Qty: 118 ML | Refills: 0 | Status: SHIPPED | OUTPATIENT
Start: 2023-01-02 | End: 2023-01-12

## 2023-01-02 RX ORDER — CETIRIZINE HYDROCHLORIDE 10 MG/1
10 TABLET ORAL DAILY
Qty: 30 TABLET | Refills: 0 | Status: SHIPPED | OUTPATIENT
Start: 2023-01-02 | End: 2023-02-01

## 2023-01-02 NOTE — PROGRESS NOTES
"Subjective:       Patient ID: Yanna Kwong is a 42 y.o. female.    Vitals:  height is 5' 3" (1.6 m) and weight is 84.8 kg (187 lb). Her temperature is 98.2 °F (36.8 °C). Her blood pressure is 107/70 and her pulse is 74. Her respiration is 17 and oxygen saturation is 99%.     Chief Complaint: Sinus Problem    Pt states woke up on Thursday w/ scratchy throat, progressed into sinus congestion, hoarseness, slight cough/congestion.     Constitution: Positive for fatigue. Negative for chills and fever.   HENT:  Positive for congestion, sore throat and voice change.    Respiratory:  Positive for cough.    Gastrointestinal:  Negative for abdominal pain, nausea, vomiting and diarrhea.   Musculoskeletal:  Positive for muscle ache.     Objective:      Physical Exam   Constitutional: She is oriented to person, place, and time. She appears well-developed.  Non-toxic appearance. She does not appear ill. No distress.   HENT:   Head: Normocephalic and atraumatic.   Ears:   Right Ear: Tympanic membrane, external ear and ear canal normal.   Left Ear: Tympanic membrane, external ear and ear canal normal.   Nose: Rhinorrhea and congestion present.   Mouth/Throat: Oropharynx is clear and moist. Mucous membranes are moist. No oropharyngeal exudate.   Eyes: Conjunctivae and EOM are normal.   Cardiovascular: Normal rate, regular rhythm and normal heart sounds.   Pulmonary/Chest: Effort normal and breath sounds normal. No respiratory distress. She has no wheezes. She has no rhonchi. She has no rales.   Abdominal: Normal appearance.   Neurological: no focal deficit. She is alert and oriented to person, place, and time.   Skin: Skin is warm and dry. Capillary refill takes 2 to 3 seconds.   Psychiatric: Her behavior is normal. Mood normal.   Nursing note and vitals reviewed.      Assessment:       1. Cough, unspecified type    2. COVID-19 virus not detected    3. Exposure to COVID-19 virus    4. Suspected COVID-19 virus infection    5. " Viral URI        Flu a/b neg  2 covid risk score    Plan:         Cough, unspecified type  -     SARS Coronavirus 2 Antigen, POCT Manual Read  -     POCT Influenza A/B    COVID-19 virus not detected    Exposure to COVID-19 virus    Suspected COVID-19 virus infection    Viral URI    Symptomatic treatment to include:    Rest, increase fluid intake to include 50 % water, 50% electrolyte replacement  Ibuprofen/Tylenol as directed for fever, sore throat, headache, body aches.  Tylenol helps with fever but ibuprofen or aleve helps best for other symptoms.   Always take ibuprofen and or Aleve with food as repeated use can cause stomach irritation.  It is also advised to start taking Pepcid 20 mg over-the-counter twice a day for 7-10 days whenever taking NSAIDs for extended times for stomach protection  Zrytec 10 mg daily for 3-4 weeks  flonase 2 sprays each nostril daily until bottle is empty.   Astelin 1 spray each nostril twice a day as needed for nasal congestion  Phenergan cough syrup at night for cough.  Will cause drowsiness  Tessalon perles cough pills as needed day or night.  Can be taken together with cough syrup if desired.  Helps best for dry, throat irritation cough.  Mucinex D over the counter as directed for sinus congestion.  Coricidin HBP if you have high blood pressure.  Warm, salt water gargles, over the counter throat lozenges or sprays as desires.   Liquid benadryl and maalox 1 to 1 concentration, gargle and spit for temporary relief for sore throat.  Imodium over the counter as directed for diarrhea if desired.  ER for difficulty breathing not relieved by rest, excessive lethargy and/or change in mental status    Follow CDC isolation guidelines as provided     Patient Instructions   COVID-19 patients are required by the CDC to undergo isolation for 5 days, then wearing a mask around others for an additional 5 days, after their symptoms first began following the new updated guidelines of 12/27/2021.  This isolation starts from the day you first developed symptoms, not the day of your positive test. For example, if your symptoms began on a Monday but tested positive on the following Wednesday, your 5-day isolation begins from that Monday, not the Wednesday you tested positive.  However, if you are asymptomatic (a person who does not have any symptoms) and COVID-19 positive, your 5-day isolation begins on the day you tested positive, regardless of exposure date.  Also, per the CDC guidelines, once your 5 days have passed, symptoms have resolved or are improving, and you have not had fever greater than 100.4F in the last 24 hours without taking any fever reducers such as Tylenol (Acetaminophen) or Motrin (Ibuprofen), you may return to your normal activities including social distancing, wearing masks, and frequent handwashing - YOU DO NOT NEED ANOTHER TEST IN ORDER TO END YOUR QUARANTINE.

## 2023-01-02 NOTE — PATIENT INSTRUCTIONS
Symptomatic treatment to include:    Rest, increase fluid intake to include 50 % water, 50% electrolyte replacement  Ibuprofen/Tylenol as directed for fever, sore throat, headache, body aches.  Tylenol helps with fever but ibuprofen or aleve helps best for other symptoms.   Always take ibuprofen and or Aleve with food as repeated use can cause stomach irritation.  It is also advised to start taking Pepcid 20 mg over-the-counter twice a day for 7-10 days whenever taking NSAIDs for extended times for stomach protection  Zrytec 10 mg daily for 3-4 weeks  flonase 2 sprays each nostril daily until bottle is empty.   Astelin 1 spray each nostril twice a day as needed for nasal congestion  Phenergan cough syrup at night for cough.  Will cause drowsiness  Tessalon perles cough pills as needed day or night.  Can be taken together with cough syrup if desired.  Helps best for dry, throat irritation cough.  Mucinex D over the counter as directed for sinus congestion.  Coricidin HBP if you have high blood pressure.  Warm, salt water gargles, over the counter throat lozenges or sprays as desires.   Liquid benadryl and maalox 1 to 1 concentration, gargle and spit for temporary relief for sore throat.  Imodium over the counter as directed for diarrhea if desired.  ER for difficulty breathing not relieved by rest, excessive lethargy and/or change in mental status    Follow CDC isolation guidelines as provided     Patient Instructions   COVID-19 patients are required by the CDC to undergo isolation for 5 days, then wearing a mask around others for an additional 5 days, after their symptoms first began following the new updated guidelines of 12/27/2021. This isolation starts from the day you first developed symptoms, not the day of your positive test. For example, if your symptoms began on a Monday but tested positive on the following Wednesday, your 5-day isolation begins from that Monday, not the Wednesday you tested  positive.  However, if you are asymptomatic (a person who does not have any symptoms) and COVID-19 positive, your 5-day isolation begins on the day you tested positive, regardless of exposure date.  Also, per the CDC guidelines, once your 5 days have passed, symptoms have resolved or are improving, and you have not had fever greater than 100.4F in the last 24 hours without taking any fever reducers such as Tylenol (Acetaminophen) or Motrin (Ibuprofen), you may return to your normal activities including social distancing, wearing masks, and frequent handwashing - YOU DO NOT NEED ANOTHER TEST IN ORDER TO END YOUR QUARANTINE.

## 2023-02-27 ENCOUNTER — OFFICE VISIT (OUTPATIENT)
Dept: INTERNAL MEDICINE | Facility: CLINIC | Age: 43
End: 2023-02-27
Payer: COMMERCIAL

## 2023-02-27 VITALS
WEIGHT: 188.94 LBS | SYSTOLIC BLOOD PRESSURE: 102 MMHG | HEIGHT: 63 IN | HEART RATE: 75 BPM | BODY MASS INDEX: 33.48 KG/M2 | DIASTOLIC BLOOD PRESSURE: 64 MMHG | OXYGEN SATURATION: 97 %

## 2023-02-27 DIAGNOSIS — Z00.00 ANNUAL PHYSICAL EXAM: Primary | ICD-10-CM

## 2023-02-27 DIAGNOSIS — Z11.1 SCREENING-PULMONARY TB: ICD-10-CM

## 2023-02-27 DIAGNOSIS — E04.1 THYROID CYST: ICD-10-CM

## 2023-02-27 PROCEDURE — 99999 PR PBB SHADOW E&M-EST. PATIENT-LVL V: CPT | Mod: PBBFAC,,, | Performed by: PHYSICIAN ASSISTANT

## 2023-02-27 PROCEDURE — 1159F MED LIST DOCD IN RCRD: CPT | Mod: CPTII,S$GLB,, | Performed by: PHYSICIAN ASSISTANT

## 2023-02-27 PROCEDURE — 3078F PR MOST RECENT DIASTOLIC BLOOD PRESSURE < 80 MM HG: ICD-10-PCS | Mod: CPTII,S$GLB,, | Performed by: PHYSICIAN ASSISTANT

## 2023-02-27 PROCEDURE — 3008F BODY MASS INDEX DOCD: CPT | Mod: CPTII,S$GLB,, | Performed by: PHYSICIAN ASSISTANT

## 2023-02-27 PROCEDURE — 3074F SYST BP LT 130 MM HG: CPT | Mod: CPTII,S$GLB,, | Performed by: PHYSICIAN ASSISTANT

## 2023-02-27 PROCEDURE — 3078F DIAST BP <80 MM HG: CPT | Mod: CPTII,S$GLB,, | Performed by: PHYSICIAN ASSISTANT

## 2023-02-27 PROCEDURE — 99396 PREV VISIT EST AGE 40-64: CPT | Mod: S$GLB,,, | Performed by: PHYSICIAN ASSISTANT

## 2023-02-27 PROCEDURE — 99999 PR PBB SHADOW E&M-EST. PATIENT-LVL V: ICD-10-PCS | Mod: PBBFAC,,, | Performed by: PHYSICIAN ASSISTANT

## 2023-02-27 PROCEDURE — 3074F PR MOST RECENT SYSTOLIC BLOOD PRESSURE < 130 MM HG: ICD-10-PCS | Mod: CPTII,S$GLB,, | Performed by: PHYSICIAN ASSISTANT

## 2023-02-27 PROCEDURE — 99396 PR PREVENTIVE VISIT,EST,40-64: ICD-10-PCS | Mod: S$GLB,,, | Performed by: PHYSICIAN ASSISTANT

## 2023-02-27 PROCEDURE — 1159F PR MEDICATION LIST DOCUMENTED IN MEDICAL RECORD: ICD-10-PCS | Mod: CPTII,S$GLB,, | Performed by: PHYSICIAN ASSISTANT

## 2023-02-27 PROCEDURE — 1160F RVW MEDS BY RX/DR IN RCRD: CPT | Mod: CPTII,S$GLB,, | Performed by: PHYSICIAN ASSISTANT

## 2023-02-27 PROCEDURE — 3008F PR BODY MASS INDEX (BMI) DOCUMENTED: ICD-10-PCS | Mod: CPTII,S$GLB,, | Performed by: PHYSICIAN ASSISTANT

## 2023-02-27 PROCEDURE — 1160F PR REVIEW ALL MEDS BY PRESCRIBER/CLIN PHARMACIST DOCUMENTED: ICD-10-PCS | Mod: CPTII,S$GLB,, | Performed by: PHYSICIAN ASSISTANT

## 2023-02-27 RX ORDER — ACETAMINOPHEN 500 MG
TABLET ORAL
COMMUNITY
Start: 2019-01-01

## 2023-02-27 RX ORDER — DICLOFENAC SODIUM 75 MG/1
75 TABLET, DELAYED RELEASE ORAL 2 TIMES DAILY
Qty: 60 TABLET | Refills: 2 | Status: SHIPPED | OUTPATIENT
Start: 2023-02-27

## 2023-02-27 RX ORDER — BERBERINE CHLOR/SEAWEED/CHROM 500-250 MG
CAPSULE ORAL
COMMUNITY
Start: 2023-02-04

## 2023-02-27 RX ORDER — METHOCARBAMOL 500 MG/1
500 TABLET, FILM COATED ORAL 4 TIMES DAILY
Qty: 120 TABLET | Refills: 2 | Status: SHIPPED | OUTPATIENT
Start: 2023-02-27

## 2023-03-01 NOTE — PROGRESS NOTES
Subjective:       Patient ID: Yanna Kwong is a 43 y.o. female.    Chief Complaint: Annual Exam    HPI    Established pt of Lexie Chisholm MD (new to me)    Here for annual physical.     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     Overall doing well.     Occ back pain/piriformis flares due to prolonged sitting/less activity desires refill of robaxin diclofenac for prn use    On chart review, overdue for f/u thyroid us for cyst seen in 2017.       Past Medical History:   Diagnosis Date    Thyroid nodule      Social History     Tobacco Use    Smoking status: Never    Smokeless tobacco: Never   Substance Use Topics    Alcohol use: No    Drug use: Never     Review of patient's allergies indicates:   Allergen Reactions    Vicodin [hydrocodone-acetaminophen] Other (See Comments)     tongue    Erythromycin Itching and Rash    Ilosone Rash       Review of Systems   Constitutional:  Negative for activity change and unexpected weight change.   HENT:  Negative for hearing loss, rhinorrhea and trouble swallowing.    Eyes:  Negative for discharge and visual disturbance.   Respiratory:  Negative for chest tightness and wheezing.    Cardiovascular:  Negative for chest pain and palpitations.   Gastrointestinal:  Negative for blood in stool, constipation, diarrhea and vomiting.   Endocrine: Negative for polydipsia and polyuria.   Genitourinary:  Negative for difficulty urinating, dysuria, hematuria and menstrual problem.   Musculoskeletal:  Negative for arthralgias, joint swelling and neck pain.   Neurological:  Negative for weakness and headaches.   Psychiatric/Behavioral:  Negative for confusion and dysphoric mood.      Answers submitted by the patient for this visit:  Review of Systems Questionnaire (Submitted on 2/27/2023)  activity change: No  unexpected weight change: No  neck pain: No  hearing loss: No  rhinorrhea: No  trouble swallowing: No  eye discharge: No  visual disturbance:  "No  chest tightness: No  wheezing: No  chest pain: No  palpitations: No  blood in stool: No  constipation: No  vomiting: No  diarrhea: No  polydipsia: No  polyuria: No  difficulty urinating: No  hematuria: No  menstrual problem: No  dysuria: No  joint swelling: No  arthralgias: No  headaches: No  weakness: No  confusion: No  dysphoric mood: No  Objective: /64 (BP Location: Left arm, Patient Position: Sitting, BP Method: Large (Manual))   Pulse 75   Ht 5' 3" (1.6 m)   Wt 85.7 kg (188 lb 15 oz)   SpO2 97%   BMI 33.47 kg/m²         Physical Exam  Vitals reviewed.   Constitutional:       General: She is not in acute distress.     Appearance: She is well-developed. She is not ill-appearing.   HENT:      Head: Normocephalic and atraumatic.      Right Ear: Tympanic membrane, ear canal and external ear normal.      Left Ear: Tympanic membrane, ear canal and external ear normal.   Cardiovascular:      Rate and Rhythm: Normal rate and regular rhythm.      Heart sounds: No murmur heard.  Pulmonary:      Effort: Pulmonary effort is normal.      Breath sounds: Normal breath sounds. No wheezing or rales.   Abdominal:      General: Bowel sounds are normal.      Palpations: Abdomen is soft.      Tenderness: There is no abdominal tenderness.   Musculoskeletal:      Right lower leg: No edema.      Left lower leg: No edema.   Lymphadenopathy:      Cervical: No cervical adenopathy.   Skin:     General: Skin is warm and dry.      Findings: No rash.   Neurological:      Mental Status: She is alert and oriented to person, place, and time.   Psychiatric:         Mood and Affect: Mood normal.       Assessment:       Problem List Items Addressed This Visit    None  Visit Diagnoses       Annual physical exam    -  Primary    Relevant Orders    CBC Auto Differential    Comprehensive Metabolic Panel    TSH    Lipid Panel    Hemoglobin A1C    Screening-pulmonary TB        Relevant Orders    QUANTIFERON GOLD TB    Thyroid cyst        " Relevant Orders    US Soft Tissue Head Neck Thyroid              Plan:       Yanan was seen today for annual exam.    Diagnoses and all orders for this visit:    Annual physical exam  HM reviewed and updated  -     CBC Auto Differential; Future  -     Comprehensive Metabolic Panel; Future  -     TSH; Future  -     Lipid Panel; Future  -     Hemoglobin A1C; Future    Screening-pulmonary TB  -     QUANTIFERON GOLD TB; Future    Thyroid cyst  -     US Soft Tissue Head Neck Thyroid; Future    Other orders  -     diclofenac (VOLTAREN) 75 MG EC tablet; Take 1 tablet (75 mg total) by mouth 2 (two) times daily.  -     methocarbamoL (ROBAXIN) 500 MG Tab; Take 1 tablet (500 mg total) by mouth 4 (four) times daily.    RTC in 1 year for next annual or sooner if needed.         DARIO DasilvaC

## 2023-03-03 ENCOUNTER — LAB VISIT (OUTPATIENT)
Dept: LAB | Facility: HOSPITAL | Age: 43
End: 2023-03-03
Attending: PHYSICIAN ASSISTANT
Payer: COMMERCIAL

## 2023-03-03 DIAGNOSIS — Z11.1 SCREENING-PULMONARY TB: ICD-10-CM

## 2023-03-03 DIAGNOSIS — Z00.00 ANNUAL PHYSICAL EXAM: ICD-10-CM

## 2023-03-03 LAB
ALBUMIN SERPL BCP-MCNC: 3.8 G/DL (ref 3.5–5.2)
ALP SERPL-CCNC: 46 U/L (ref 55–135)
ALT SERPL W/O P-5'-P-CCNC: 12 U/L (ref 10–44)
ANION GAP SERPL CALC-SCNC: 8 MMOL/L (ref 8–16)
AST SERPL-CCNC: 18 U/L (ref 10–40)
BASOPHILS # BLD AUTO: 0.04 K/UL (ref 0–0.2)
BASOPHILS NFR BLD: 0.7 % (ref 0–1.9)
BILIRUB SERPL-MCNC: 0.9 MG/DL (ref 0.1–1)
BUN SERPL-MCNC: 6 MG/DL (ref 6–20)
CALCIUM SERPL-MCNC: 9.3 MG/DL (ref 8.7–10.5)
CHLORIDE SERPL-SCNC: 108 MMOL/L (ref 95–110)
CHOLEST SERPL-MCNC: 213 MG/DL (ref 120–199)
CHOLEST/HDLC SERPL: 3.9 {RATIO} (ref 2–5)
CO2 SERPL-SCNC: 24 MMOL/L (ref 23–29)
CREAT SERPL-MCNC: 0.9 MG/DL (ref 0.5–1.4)
DIFFERENTIAL METHOD: ABNORMAL
EOSINOPHIL # BLD AUTO: 0 K/UL (ref 0–0.5)
EOSINOPHIL NFR BLD: 0.7 % (ref 0–8)
ERYTHROCYTE [DISTWIDTH] IN BLOOD BY AUTOMATED COUNT: 13.2 % (ref 11.5–14.5)
EST. GFR  (NO RACE VARIABLE): >60 ML/MIN/1.73 M^2
GLUCOSE SERPL-MCNC: 87 MG/DL (ref 70–110)
HCT VFR BLD AUTO: 40.4 % (ref 37–48.5)
HDLC SERPL-MCNC: 55 MG/DL (ref 40–75)
HDLC SERPL: 25.8 % (ref 20–50)
HGB BLD-MCNC: 12.5 G/DL (ref 12–16)
IMM GRANULOCYTES # BLD AUTO: 0 K/UL (ref 0–0.04)
IMM GRANULOCYTES NFR BLD AUTO: 0 % (ref 0–0.5)
LDLC SERPL CALC-MCNC: 145.4 MG/DL (ref 63–159)
LYMPHOCYTES # BLD AUTO: 1.6 K/UL (ref 1–4.8)
LYMPHOCYTES NFR BLD: 29.8 % (ref 18–48)
MCH RBC QN AUTO: 28.3 PG (ref 27–31)
MCHC RBC AUTO-ENTMCNC: 30.9 G/DL (ref 32–36)
MCV RBC AUTO: 92 FL (ref 82–98)
MONOCYTES # BLD AUTO: 0.2 K/UL (ref 0.3–1)
MONOCYTES NFR BLD: 3.8 % (ref 4–15)
NEUTROPHILS # BLD AUTO: 3.6 K/UL (ref 1.8–7.7)
NEUTROPHILS NFR BLD: 65 % (ref 38–73)
NONHDLC SERPL-MCNC: 158 MG/DL
NRBC BLD-RTO: 0 /100 WBC
PLATELET # BLD AUTO: 410 K/UL (ref 150–450)
PMV BLD AUTO: 10 FL (ref 9.2–12.9)
POTASSIUM SERPL-SCNC: 3.9 MMOL/L (ref 3.5–5.1)
PROT SERPL-MCNC: 7.3 G/DL (ref 6–8.4)
RBC # BLD AUTO: 4.41 M/UL (ref 4–5.4)
SODIUM SERPL-SCNC: 140 MMOL/L (ref 136–145)
TRIGL SERPL-MCNC: 63 MG/DL (ref 30–150)
TSH SERPL DL<=0.005 MIU/L-ACNC: 0.48 UIU/ML (ref 0.4–4)
WBC # BLD AUTO: 5.47 K/UL (ref 3.9–12.7)

## 2023-03-03 PROCEDURE — 85025 COMPLETE CBC W/AUTO DIFF WBC: CPT | Performed by: PHYSICIAN ASSISTANT

## 2023-03-03 PROCEDURE — 80053 COMPREHEN METABOLIC PANEL: CPT | Performed by: PHYSICIAN ASSISTANT

## 2023-03-03 PROCEDURE — 84443 ASSAY THYROID STIM HORMONE: CPT | Performed by: PHYSICIAN ASSISTANT

## 2023-03-03 PROCEDURE — 86480 TB TEST CELL IMMUN MEASURE: CPT | Performed by: PHYSICIAN ASSISTANT

## 2023-03-03 PROCEDURE — 36415 COLL VENOUS BLD VENIPUNCTURE: CPT | Mod: PO | Performed by: PHYSICIAN ASSISTANT

## 2023-03-03 PROCEDURE — 83036 HEMOGLOBIN GLYCOSYLATED A1C: CPT | Performed by: PHYSICIAN ASSISTANT

## 2023-03-03 PROCEDURE — 80061 LIPID PANEL: CPT | Performed by: PHYSICIAN ASSISTANT

## 2023-03-04 LAB
ESTIMATED AVG GLUCOSE: 100 MG/DL (ref 68–131)
HBA1C MFR BLD: 5.1 % (ref 4–5.6)

## 2023-03-06 LAB
GAMMA INTERFERON BACKGROUND BLD IA-ACNC: 0.01 IU/ML
M TB IFN-G CD4+ BCKGRND COR BLD-ACNC: -0.01 IU/ML
MITOGEN IGNF BCKGRD COR BLD-ACNC: 9.99 IU/ML
TB GOLD PLUS: NEGATIVE
TB2 - NIL: -0.01 IU/ML

## 2023-03-07 ENCOUNTER — PATIENT MESSAGE (OUTPATIENT)
Dept: INTERNAL MEDICINE | Facility: CLINIC | Age: 43
End: 2023-03-07
Payer: COMMERCIAL

## 2023-03-08 ENCOUNTER — PATIENT MESSAGE (OUTPATIENT)
Dept: INTERNAL MEDICINE | Facility: CLINIC | Age: 43
End: 2023-03-08
Payer: COMMERCIAL

## 2023-03-08 ENCOUNTER — HOSPITAL ENCOUNTER (OUTPATIENT)
Dept: RADIOLOGY | Facility: HOSPITAL | Age: 43
Discharge: HOME | End: 2023-03-08
Attending: PHYSICIAN ASSISTANT
Payer: COMMERCIAL

## 2023-03-08 DIAGNOSIS — E04.1 THYROID CYST: ICD-10-CM

## 2023-03-08 PROCEDURE — 76536 US EXAM OF HEAD AND NECK: CPT | Mod: TC

## 2023-03-08 PROCEDURE — 76536 US EXAM OF HEAD AND NECK: CPT | Mod: 26,,, | Performed by: RADIOLOGY

## 2023-03-08 PROCEDURE — 76536 US SOFT TISSUE HEAD NECK THYROID: ICD-10-PCS | Mod: 26,,, | Performed by: RADIOLOGY

## 2023-03-08 NOTE — TELEPHONE ENCOUNTER
"Pt requesting TB signature    Printed pt's form requested Quantiferon TB Results    Both form and results printed and placed in "Sign" tray  "

## 2023-03-31 ENCOUNTER — OFFICE VISIT (OUTPATIENT)
Dept: OBSTETRICS AND GYNECOLOGY | Facility: CLINIC | Age: 43
End: 2023-03-31
Payer: COMMERCIAL

## 2023-03-31 VITALS
SYSTOLIC BLOOD PRESSURE: 114 MMHG | WEIGHT: 186.94 LBS | DIASTOLIC BLOOD PRESSURE: 76 MMHG | BODY MASS INDEX: 33.12 KG/M2

## 2023-03-31 DIAGNOSIS — Z01.419 WELL WOMAN EXAM WITH ROUTINE GYNECOLOGICAL EXAM: Primary | ICD-10-CM

## 2023-03-31 DIAGNOSIS — Z12.4 ROUTINE CERVICAL SMEAR: ICD-10-CM

## 2023-03-31 DIAGNOSIS — Z12.31 ENCOUNTER FOR SCREENING MAMMOGRAM FOR MALIGNANT NEOPLASM OF BREAST: ICD-10-CM

## 2023-03-31 PROCEDURE — 3008F PR BODY MASS INDEX (BMI) DOCUMENTED: ICD-10-PCS | Mod: CPTII,S$GLB,, | Performed by: OBSTETRICS & GYNECOLOGY

## 2023-03-31 PROCEDURE — 3074F SYST BP LT 130 MM HG: CPT | Mod: CPTII,S$GLB,, | Performed by: OBSTETRICS & GYNECOLOGY

## 2023-03-31 PROCEDURE — 3044F PR MOST RECENT HEMOGLOBIN A1C LEVEL <7.0%: ICD-10-PCS | Mod: CPTII,S$GLB,, | Performed by: OBSTETRICS & GYNECOLOGY

## 2023-03-31 PROCEDURE — 1159F PR MEDICATION LIST DOCUMENTED IN MEDICAL RECORD: ICD-10-PCS | Mod: CPTII,S$GLB,, | Performed by: OBSTETRICS & GYNECOLOGY

## 2023-03-31 PROCEDURE — 99999 PR PBB SHADOW E&M-EST. PATIENT-LVL III: ICD-10-PCS | Mod: PBBFAC,,, | Performed by: OBSTETRICS & GYNECOLOGY

## 2023-03-31 PROCEDURE — 99999 PR PBB SHADOW E&M-EST. PATIENT-LVL III: CPT | Mod: PBBFAC,,, | Performed by: OBSTETRICS & GYNECOLOGY

## 2023-03-31 PROCEDURE — 99396 PREV VISIT EST AGE 40-64: CPT | Mod: S$GLB,,, | Performed by: OBSTETRICS & GYNECOLOGY

## 2023-03-31 PROCEDURE — 1159F MED LIST DOCD IN RCRD: CPT | Mod: CPTII,S$GLB,, | Performed by: OBSTETRICS & GYNECOLOGY

## 2023-03-31 PROCEDURE — 3044F HG A1C LEVEL LT 7.0%: CPT | Mod: CPTII,S$GLB,, | Performed by: OBSTETRICS & GYNECOLOGY

## 2023-03-31 PROCEDURE — 1160F RVW MEDS BY RX/DR IN RCRD: CPT | Mod: CPTII,S$GLB,, | Performed by: OBSTETRICS & GYNECOLOGY

## 2023-03-31 PROCEDURE — 1160F PR REVIEW ALL MEDS BY PRESCRIBER/CLIN PHARMACIST DOCUMENTED: ICD-10-PCS | Mod: CPTII,S$GLB,, | Performed by: OBSTETRICS & GYNECOLOGY

## 2023-03-31 PROCEDURE — 88175 CYTOPATH C/V AUTO FLUID REDO: CPT | Performed by: OBSTETRICS & GYNECOLOGY

## 2023-03-31 PROCEDURE — 3074F PR MOST RECENT SYSTOLIC BLOOD PRESSURE < 130 MM HG: ICD-10-PCS | Mod: CPTII,S$GLB,, | Performed by: OBSTETRICS & GYNECOLOGY

## 2023-03-31 PROCEDURE — 3008F BODY MASS INDEX DOCD: CPT | Mod: CPTII,S$GLB,, | Performed by: OBSTETRICS & GYNECOLOGY

## 2023-03-31 PROCEDURE — 87624 HPV HI-RISK TYP POOLED RSLT: CPT | Performed by: OBSTETRICS & GYNECOLOGY

## 2023-03-31 PROCEDURE — 99396 PR PREVENTIVE VISIT,EST,40-64: ICD-10-PCS | Mod: S$GLB,,, | Performed by: OBSTETRICS & GYNECOLOGY

## 2023-03-31 PROCEDURE — 3078F DIAST BP <80 MM HG: CPT | Mod: CPTII,S$GLB,, | Performed by: OBSTETRICS & GYNECOLOGY

## 2023-03-31 PROCEDURE — 3078F PR MOST RECENT DIASTOLIC BLOOD PRESSURE < 80 MM HG: ICD-10-PCS | Mod: CPTII,S$GLB,, | Performed by: OBSTETRICS & GYNECOLOGY

## 2023-03-31 NOTE — PROGRESS NOTES
OBSTETRIC HISTORY:   OB History          0    Para        Term   0            AB        Living             SAB        IAB        Ectopic        Multiple        Live Births                    COMPREHENSIVE GYN HISTORY:  PAP History: Denies abnormal Paps.  Infection History: Denies STDs. Denies PID.  Benign History: Denies uterine fibroids. Denies ovarian cysts. Denies endometriosis.   Cancer History: Denies cervical cancer. Denies uterine cancer or hyperplasia. Denies ovarian cancer. Denies vulvar cancer or pre-cancer. Denies vaginal cancer or pre-cancer. Denies breast cancer. Denies colon cancer.  Sexual Activity History:   reports that she does not engage in sexual activity.   Menstrual History: Every 21-23 days, flows for 5 days. Moderate to heavy flow.  Dysmenorrhea History: Reports severe dysmenorrhea. (takes Excedrin)  Contraception: None        HPI:   43 y.o.  Patient's last menstrual period was 2023.   Patient is  here for her annual gynecologic exam.  She has no GYN complaints. She denies bladder, breast and bowel complaints.    ROS:  GENERAL: Denies weight gain or weight loss. Feeling well overall.   SKIN: Denies rash or lesions.   HEAD: Denies headache.   NODES: Denies enlarged lymph nodes.   CHEST: Denies shortness of breath.   ABDOMEN: No abdominal pain, constipation, diarrhea, nausea, vomiting or rectal bleeding.   URINARY: No frequency, dysuria, hematuria, or burning on urination.  REPRODUCTIVE: See HPI.   BREASTS: The patient denies pain, lumps, or nipple discharge.   HEMATOLOGIC: No easy bruisability.   MUSCULOSKELETAL: Denies joint pain or back pain.   NEUROLOGIC: Denies weakness.   PSYCHIATRIC: Denies depression, anxiety or mood swings.    PE:   /76   Wt 84.8 kg (186 lb 15.2 oz)   LMP 2023   BMI 33.12 kg/m²   APPEARANCE: Well nourished, well developed, in no acute distress.  NECK: Neck symmetric without  thyromegaly.  NODES: No inguinal, cervical lymph node  enlargement.  CHEST: Lungs clear to auscultation.  HEART: Regular rate and rhythm, no murmurs, rubs or gallops.  ABDOMEN: Soft. No tenderness or masses. No hernias.  BREASTS: Symmetrical, no skin changes or visible lesions. No palpable masses, nipple discharge or adenopathy bilaterally.  PELVIC:   VULVA: No lesions. Normal female genitalia.  URETHRAL MEATUS: Normal size and location, no lesions, no prolapse.  URETHRA: No masses, tenderness, prolapse or scarring.  VAGINA: Moist and well rugated, no discharge, no significant cystocele or rectocele.  CERVIX: No lesions and discharge.  UTERUS: Normal size, regular shape, mobile, non-tender, bladder base nontender.  ADNEXA: No masses or tenderness.    PROCEDURES:  Pap smear  HRHPV    Assessment:  Normal Gynecologic Exam    Plan:  Mammogram and Colonoscopy if indicated by current recommendations.  Return to clinic in one year or for any problems or complaints.    Counseling:  Patient was counseled today on A.C.S. Pap guidelines and recommendations for yearly pelvic exams and monthly self breast exams; to see her PCP for other health maintenance. Regular exercise and healthy diet.     As of April 1, 2021, the Cures Act has been passed nationally. This new law requires that all doctors progress notes, lab results, pathology reports and radiology reports be released IMMEDIATELY to the patient in the patient portal. That means that the results are released to you at the EXACT same time they are released to me. Therefore, with all of the patients that I have I am not able to reply to each patient exactly when the results come in. So there will be a delay from when you see the results to when I see them and have time to come up with a response to send you. Also I only see these results when I am on the computer at work. So if the results come in over the weekend or after 5 pm of a work day, I will not see them until the next business day. As you can tell, this is a challenge as  a physician to give every patient the quick response they hope for and deserve. So please be patient!     Thanks for understanding,

## 2023-04-05 LAB
HPV HR 12 DNA SPEC QL NAA+PROBE: NEGATIVE
HPV16 AG SPEC QL: NEGATIVE
HPV18 DNA SPEC QL NAA+PROBE: NEGATIVE

## 2023-04-11 LAB
FINAL PATHOLOGIC DIAGNOSIS: NORMAL
Lab: NORMAL

## 2023-05-09 ENCOUNTER — OFFICE VISIT (OUTPATIENT)
Dept: OPTOMETRY | Facility: CLINIC | Age: 43
End: 2023-05-09
Payer: COMMERCIAL

## 2023-05-09 DIAGNOSIS — H52.03 HYPEROPIA OF BOTH EYES: Primary | ICD-10-CM

## 2023-05-09 PROCEDURE — 99999 PR PBB SHADOW E&M-EST. PATIENT-LVL III: ICD-10-PCS | Mod: PBBFAC,,, | Performed by: OPTOMETRIST

## 2023-05-09 PROCEDURE — 92015 PR REFRACTION: ICD-10-PCS | Mod: S$GLB,,, | Performed by: OPTOMETRIST

## 2023-05-09 PROCEDURE — 99999 PR PBB SHADOW E&M-EST. PATIENT-LVL III: CPT | Mod: PBBFAC,,, | Performed by: OPTOMETRIST

## 2023-05-09 PROCEDURE — 92014 COMPRE OPH EXAM EST PT 1/>: CPT | Mod: S$GLB,,, | Performed by: OPTOMETRIST

## 2023-05-09 PROCEDURE — 92015 DETERMINE REFRACTIVE STATE: CPT | Mod: S$GLB,,, | Performed by: OPTOMETRIST

## 2023-05-09 PROCEDURE — 92014 PR EYE EXAM, EST PATIENT,COMPREHESV: ICD-10-PCS | Mod: S$GLB,,, | Performed by: OPTOMETRIST

## 2023-05-09 NOTE — PROGRESS NOTES
HPI    Yanna Kwong is a 43 y.o. female who returns, for continued eye care.   Yanna's last exam with me was on 2/7/22.  SHe has a history of latent   bilateral hyperopia. Low plus glasses have tata prescribed for   near/computer use. Today, she reports having increasing difficulty with   near vision along with Asthenopia, and eye fatigue without glasses over   the last few months. She explains that she's having to hold reading   materials farther away to see them clearly. She uses her glasses for   computer work.     (+)blurred vision near  (--)Headaches  (--)diplopia  (--)flashes  (+)floaters  (--)pain  (--)Itching  (--)tearing  (--)burning  (--)Dryness  (+) OTC Drops Rarely unknown brand  (--)Photophobia  Last edited by Sergio Soto, OD on 5/9/2023 10:37 AM.        For exam results, see encounter report    Mild, Bilateral Hyperopia and presbyopia  - Ok to continue +0.75 glasses for near and computer work  - Also ok to fill PAL spec rx per below AS NEEDED  Glasses Prescription (5/9/2023)          Sphere Cylinder Add    Right Eldridge Sphere +1.25    Left Eldridge Sphere +1.25      Type: PAL    Expiration Date: 5/9/2024          Glasses Prescription (5/9/2023)  #2         Sphere Cylinder Add    Right +0.75 Sphere     Left +0.75 Sphere       Type: near -SVL    Expiration Date: 5/9/2024          2. Meibomian Gland dysfunction with blepharitis   - Use hot compresses to both eyelids, 20 minutes daily for 1 week, then 2-3 x week  - Use a hypochlorous acid eyelid cleanser twice daily (Avenova, Mamadou, Ocusoft HypoChlor, TheraTears Steri-Lid)     3. Ocular Health intact    Patient education; RTC in 1 year, sooner as needed at [facility]

## 2023-05-09 NOTE — PATIENT INSTRUCTIONS
Meibomian Gland Dysfunction    The meibomian glands are located in the eyelids, near the eyelashes. Secretions from these glands make up the lipid (oily) layer of the tear film. This oily layer sits on top of the liquid (aqueous) tears to prevent rapid evaporation of the tears.         Failure of these glands to produce or secrete oil - due to chronic blockage, thickening of the oils, infection or inflammation, will affect the stability and quality of the tear film. This is known as meibomian gland dysfunction.        Meibomian Gland Dysfunction can be treated in several ways:  Warm Compresses: Heat applied to the eyelid margins (once to twice daily) liquefies the thickened oils, as well as helps to open the meibomian glands so that proper function can be restored.  Options for heated masks to unclog meibomian glands:  https://www.[employer]/Aroma-Season-Flaxseed-Syndrome-Blepharitis/dp/U20XQW258A/ref=sr_1_3?dchild=1&keywords=lipiflow&tqw=5251964793&sr=8-3    https://[employer]/Aroma-Season-[email or web address]-Blepharitis/dp/J572NEWGQ0/ref=sr_1_4?dchild=1&keywords=lipiflow&cii=5321731338&sr=8-4    [email or web address]/Mamadou-Activated-Dysfunction-Recommended-Professional/dp/P25E135LVG/ref=sr_1_19?dchild=1&keywords=lipiflow&otz=5400942154&sr=8-19    2. Eyelid Cleanser: It is important to keep the eyelid margins free of excess bacteria and debris. A cleanser specifically formulated for the delicate eye area is an ideal way to accomplish this. Gently cleaning the eyelash/eyelid margins once to twice a day will also stimulate the meibomian glands to properly secrete their oils. Using baby shampoo to clean the eye area strips away the natural oils which protect the eye, often leading to more irritation and problems. DO NOT USE ANY FORM OF BABY SHAMPOO TO CLEAN EYES!   Options for the most effective commercially available eyelid cleansers:   Avenova Lid and Lash Solution                        [facility]  Eyelid Solution    Thera Tears SteriLid    Ocusoft HypoChlor              3. Topical antibiotic ointment  4.  Oral Antibiotic   5. Omega 3 supplement daily (ex: Thera Tears Nutrition (3 sofgels po daily)

## 2023-06-15 ENCOUNTER — TELEPHONE (OUTPATIENT)
Dept: ADMINISTRATIVE | Facility: OTHER | Age: 43
End: 2023-06-15
Payer: COMMERCIAL

## 2023-08-03 ENCOUNTER — HOSPITAL ENCOUNTER (OUTPATIENT)
Dept: RADIOLOGY | Facility: HOSPITAL | Age: 43
Discharge: HOME | End: 2023-08-03
Attending: OBSTETRICS & GYNECOLOGY
Payer: COMMERCIAL

## 2023-08-03 VITALS — BODY MASS INDEX: 32.91 KG/M2 | WEIGHT: 185.81 LBS

## 2023-08-03 DIAGNOSIS — Z12.31 ENCOUNTER FOR SCREENING MAMMOGRAM FOR MALIGNANT NEOPLASM OF BREAST: ICD-10-CM

## 2023-08-03 PROCEDURE — 77063 BREAST TOMOSYNTHESIS BI: CPT | Mod: 26,,, | Performed by: RADIOLOGY

## 2023-08-03 PROCEDURE — 77067 SCR MAMMO BI INCL CAD: CPT | Mod: 26,,, | Performed by: RADIOLOGY

## 2023-08-03 PROCEDURE — 77063 MAMMO DIGITAL SCREENING BILAT WITH TOMO: ICD-10-PCS | Mod: 26,,, | Performed by: RADIOLOGY

## 2023-08-03 PROCEDURE — 77067 MAMMO DIGITAL SCREENING BILAT WITH TOMO: ICD-10-PCS | Mod: 26,,, | Performed by: RADIOLOGY

## 2023-08-03 PROCEDURE — 77067 SCR MAMMO BI INCL CAD: CPT | Mod: TC

## 2023-09-14 ENCOUNTER — IMMUNIZATION (OUTPATIENT)
Dept: INTERNAL MEDICINE | Facility: CLINIC | Age: 43
End: 2023-09-14
Payer: COMMERCIAL

## 2023-09-14 PROCEDURE — 90471 IMMUNIZATION ADMIN: CPT | Mod: S$GLB,,, | Performed by: INTERNAL MEDICINE

## 2023-09-14 PROCEDURE — 90471 FLU VACCINE (QUAD) GREATER THAN OR EQUAL TO 3YO PRESERVATIVE FREE IM: ICD-10-PCS | Mod: S$GLB,,, | Performed by: INTERNAL MEDICINE

## 2023-09-14 PROCEDURE — 90686 FLU VACCINE (QUAD) GREATER THAN OR EQUAL TO 3YO PRESERVATIVE FREE IM: ICD-10-PCS | Mod: S$GLB,,, | Performed by: INTERNAL MEDICINE

## 2023-09-14 PROCEDURE — 90686 IIV4 VACC NO PRSV 0.5 ML IM: CPT | Mod: S$GLB,,, | Performed by: INTERNAL MEDICINE

## 2023-09-21 ENCOUNTER — PATIENT MESSAGE (OUTPATIENT)
Dept: INTERNAL MEDICINE | Facility: CLINIC | Age: 43
End: 2023-09-21
Payer: COMMERCIAL

## 2023-09-21 NOTE — TELEPHONE ENCOUNTER
Obtained vaccine information from [facility]    Filed in GLYNN Gibson's drawer for future ease of finding for this pt

## 2024-01-24 ENCOUNTER — OFFICE VISIT (OUTPATIENT)
Dept: INTERNAL MEDICINE | Facility: CLINIC | Age: 44
End: 2024-01-24
Payer: COMMERCIAL

## 2024-01-24 ENCOUNTER — LAB VISIT (OUTPATIENT)
Dept: LAB | Facility: HOSPITAL | Age: 44
End: 2024-01-24
Payer: COMMERCIAL

## 2024-01-24 VITALS
SYSTOLIC BLOOD PRESSURE: 120 MMHG | HEIGHT: 63 IN | WEIGHT: 188.06 LBS | OXYGEN SATURATION: 99 % | BODY MASS INDEX: 33.32 KG/M2 | DIASTOLIC BLOOD PRESSURE: 76 MMHG | HEART RATE: 75 BPM

## 2024-01-24 DIAGNOSIS — E66.09 CLASS 1 OBESITY DUE TO EXCESS CALORIES WITHOUT SERIOUS COMORBIDITY WITH BODY MASS INDEX (BMI) OF 33.0 TO 33.9 IN ADULT: ICD-10-CM

## 2024-01-24 DIAGNOSIS — Z11.1 SCREENING-PULMONARY TB: ICD-10-CM

## 2024-01-24 DIAGNOSIS — Z00.00 ANNUAL PHYSICAL EXAM: Primary | ICD-10-CM

## 2024-01-24 PROCEDURE — 3074F SYST BP LT 130 MM HG: CPT | Mod: CPTII,S$GLB,, | Performed by: PHYSICIAN ASSISTANT

## 2024-01-24 PROCEDURE — 3008F BODY MASS INDEX DOCD: CPT | Mod: CPTII,S$GLB,, | Performed by: PHYSICIAN ASSISTANT

## 2024-01-24 PROCEDURE — 1160F RVW MEDS BY RX/DR IN RCRD: CPT | Mod: CPTII,S$GLB,, | Performed by: PHYSICIAN ASSISTANT

## 2024-01-24 PROCEDURE — 99396 PREV VISIT EST AGE 40-64: CPT | Mod: S$GLB,,, | Performed by: PHYSICIAN ASSISTANT

## 2024-01-24 PROCEDURE — 1159F MED LIST DOCD IN RCRD: CPT | Mod: CPTII,S$GLB,, | Performed by: PHYSICIAN ASSISTANT

## 2024-01-24 PROCEDURE — 86480 TB TEST CELL IMMUN MEASURE: CPT | Performed by: PHYSICIAN ASSISTANT

## 2024-01-24 PROCEDURE — 3078F DIAST BP <80 MM HG: CPT | Mod: CPTII,S$GLB,, | Performed by: PHYSICIAN ASSISTANT

## 2024-01-24 PROCEDURE — 99999 PR PBB SHADOW E&M-EST. PATIENT-LVL V: CPT | Mod: PBBFAC,,, | Performed by: PHYSICIAN ASSISTANT

## 2024-01-24 NOTE — PROGRESS NOTES
"Subjective     Patient ID: Yanna Kwong is a 43 y.o. female.    Chief Complaint: Annual Exam    HPI    Established pt of No, Primary Doctor     Here for annual exam.   Overall doing well.   Graduating nursing school in Aug  Needs TB screen and form signed  Stress eating weight stable, open to see nutrition.       Past Medical History:   Diagnosis Date    Thyroid nodule      Social History     Tobacco Use    Smoking status: Never    Smokeless tobacco: Never   Substance Use Topics    Alcohol use: No    Drug use: Never     Review of patient's allergies indicates:   Allergen Reactions    Vicodin [hydrocodone-acetaminophen] Other (See Comments)     tongue    Erythromycin Itching and Rash    Ilosone Rash       Review of Systems   Constitutional:  Negative for chills, fever and unexpected weight change.   Eyes:  Negative for visual disturbance.   Respiratory:  Negative for cough, shortness of breath and wheezing.    Cardiovascular:  Negative for chest pain and leg swelling.   Gastrointestinal:  Negative for abdominal pain, nausea and vomiting.   Endocrine: Negative for polydipsia, polyphagia and polyuria.   Integumentary:  Negative for rash.   Neurological:  Negative for weakness, light-headedness and headaches.          Objective  /76 (BP Location: Left arm, Patient Position: Sitting, BP Method: Medium (Manual))   Pulse 75   Ht 5' 3" (1.6 m)   Wt 85.3 kg (188 lb 0.8 oz)   SpO2 99%   BMI 33.31 kg/m²       Physical Exam  Vitals reviewed.   Constitutional:       General: She is not in acute distress.     Appearance: She is well-developed.   HENT:      Head: Normocephalic and atraumatic.      Right Ear: Tympanic membrane, ear canal and external ear normal.      Left Ear: Tympanic membrane, ear canal and external ear normal.   Cardiovascular:      Rate and Rhythm: Normal rate and regular rhythm.      Heart sounds: No murmur heard.  Pulmonary:      Effort: Pulmonary effort is normal.      Breath sounds: Normal " breath sounds. No wheezing or rales.   Abdominal:      General: Bowel sounds are normal.      Palpations: Abdomen is soft.      Tenderness: There is no abdominal tenderness.   Musculoskeletal:      Right lower leg: No edema.      Left lower leg: No edema.   Skin:     General: Skin is warm and dry.      Findings: No rash.   Neurological:      Mental Status: She is alert and oriented to person, place, and time.   Psychiatric:         Mood and Affect: Mood normal.            Assessment and Plan     1. Annual physical exam  HM reviewed and updated  -     CBC Auto Differential; Future; Expected date: 01/24/2024  -     Comprehensive Metabolic Panel; Future; Expected date: 01/24/2024  -     TSH; Future; Expected date: 01/24/2024  -     Lipid Panel; Future; Expected date: 01/24/2024  -     Hemoglobin A1C; Future; Expected date: 01/24/2024    2. Screening-pulmonary TB  -     QUANTIFERON GOLD TB; Future; Expected date: 01/24/2024    3. Class 1 obesity due to excess calories without serious comorbidity with body mass index (BMI) of 33.0 to 33.9 in adult  -     Ambulatory Referral/Consult to Lifestyle Nutrition; Future; Expected date: 01/31/2024        Opal Gibson PA-C

## 2024-01-26 LAB
GAMMA INTERFERON BACKGROUND BLD IA-ACNC: 0.03 IU/ML
M TB IFN-G CD4+ BCKGRND COR BLD-ACNC: -0 IU/ML
M TB IFN-G CD4+ BCKGRND COR BLD-ACNC: -0 IU/ML
MITOGEN IGNF BCKGRD COR BLD-ACNC: 9.97 IU/ML
TB GOLD PLUS: NEGATIVE

## 2024-02-06 ENCOUNTER — OFFICE VISIT (OUTPATIENT)
Dept: URGENT CARE | Facility: CLINIC | Age: 44
End: 2024-02-06
Payer: COMMERCIAL

## 2024-02-06 VITALS
DIASTOLIC BLOOD PRESSURE: 80 MMHG | SYSTOLIC BLOOD PRESSURE: 124 MMHG | RESPIRATION RATE: 18 BRPM | BODY MASS INDEX: 33.31 KG/M2 | OXYGEN SATURATION: 96 % | HEIGHT: 63 IN | HEART RATE: 80 BPM | WEIGHT: 188 LBS | TEMPERATURE: 98 F

## 2024-02-06 DIAGNOSIS — J02.9 SORE THROAT: ICD-10-CM

## 2024-02-06 DIAGNOSIS — J02.9 ACUTE VIRAL PHARYNGITIS: ICD-10-CM

## 2024-02-06 DIAGNOSIS — Z11.3 SCREEN FOR STD (SEXUALLY TRANSMITTED DISEASE): Primary | ICD-10-CM

## 2024-02-06 DIAGNOSIS — K21.9 GASTROESOPHAGEAL REFLUX DISEASE, UNSPECIFIED WHETHER ESOPHAGITIS PRESENT: ICD-10-CM

## 2024-02-06 LAB
CTP QC/QA: YES
MOLECULAR STREP A: NEGATIVE
POC MOLECULAR INFLUENZA A AGN: NEGATIVE
POC MOLECULAR INFLUENZA B AGN: NEGATIVE
SARS-COV-2 AG RESP QL IA.RAPID: NEGATIVE

## 2024-02-06 PROCEDURE — 81514 NFCT DS BV&VAGINITIS DNA ALG: CPT

## 2024-02-06 PROCEDURE — 87651 STREP A DNA AMP PROBE: CPT | Mod: QW,S$GLB,,

## 2024-02-06 PROCEDURE — 87502 INFLUENZA DNA AMP PROBE: CPT | Mod: QW,S$GLB,,

## 2024-02-06 PROCEDURE — 99214 OFFICE O/P EST MOD 30 MIN: CPT | Mod: S$GLB,,,

## 2024-02-06 PROCEDURE — 87389 HIV-1 AG W/HIV-1&-2 AB AG IA: CPT

## 2024-02-06 PROCEDURE — 87491 CHLMYD TRACH DNA AMP PROBE: CPT

## 2024-02-06 PROCEDURE — 86592 SYPHILIS TEST NON-TREP QUAL: CPT

## 2024-02-06 PROCEDURE — 87811 SARS-COV-2 COVID19 W/OPTIC: CPT | Mod: QW,S$GLB,,

## 2024-02-06 PROCEDURE — 87529 HSV DNA AMP PROBE: CPT | Mod: 59

## 2024-02-06 NOTE — PATIENT INSTRUCTIONS
- Take 20 mg of pepcid at night for acid reflux. Acid reflux friendly diet attached.       PLEASE READ YOUR DISCHARGE INSTRUCTIONS ENTIRELY AS IT CONTAINS IMPORTANT INFORMATION.     STD Screening     You were tested for sexual transmitted diseases today, we will call you in 3-7 days with the results of the testing. If you need more medication when the labs result come in, we will call you and phone them in for you.  Please remain abstinent until further notice.      REMEMBER WEAR CONDOMS AND GET TESTED OFTEN.            IF POSITIVE:  Notify sexual partners of the need for testing.    NO sexual intercourse until given all lab results and appropriate treatment.      Complete ALL medications prescribed (if required).  Please supplement with OTC probiotics and yogurt if prescribed antibiotics.   NO sexual intercourse for 7-14 days after treatment (if required).      Retest to ensure infection has cleared - there are infections that require more agressive treatment.  Retest for all in 6 weeks (if still have symptoms) and again in 6 months to ensure true negative results.          TODAY'S TESTING WILL GIVE NO CREDIBLE INFORMATION IF YOU HAVE UNPROTECTED SEXUAL ACTIVITIES GOING FORWARD    - Rest.    - Drink plenty of fluids. Increasing your fluid intake will help loosen up mucous.  - Viral upper respiratory infections typically run their course in 10-14 days.      - Chloraseptic throat spray can help numb the throat.     - Warm salt water gargles can help with sore throat.  - Warm tea with honey can help with sore throat and cough. Honey is a natural cough suppressant.      - You must understand that you have received an Urgent Care treatment only and that you may be released before all of your medical problems are known or treated.   - You, the patient, will arrange for follow up care as instructed.   - If your condition worsens or fails to improve we recommend that you receive another evaluation at the ER immediately or  contact your PCP to discuss your concerns or return here.   - Follow up with your PCP or specialty clinic as directed in the next 1-2 weeks if not improved or as needed.  You can call [phone] to schedule an appointment with the appropriate provider.    If your symptoms do not improve or worsen, go to the emergency room immediately.

## 2024-02-06 NOTE — PROGRESS NOTES
"Subjective:      Patient ID: Yanna Kwong is a 43 y.o. female.    Vitals:  height is 5' 2.99" (1.6 m) and weight is 85.3 kg (188 lb). Her oral temperature is 98.4 °F (36.9 °C). Her blood pressure is 124/80 and her pulse is 80. Her respiration is 18 and oxygen saturation is 96%.     Chief Complaint: Sore Throat (Entered by patient)    This is a 43 y.o. female who presents today with a chief complaint of sore throat, body aches and fatigue. Patient states that it started on Friday and still very irritated.     Sore Throat   This is a new problem. The current episode started in the past 7 days. The problem has been unchanged. There has been no fever. The pain is at a severity of 0/10. The patient is experiencing no pain. Associated symptoms include swollen glands and trouble swallowing. Treatments tried: warm salt gargles. The treatment provided no relief.       HENT:  Positive for sore throat and trouble swallowing.    Neurological:  Negative for disorientation and altered mental status.   Psychiatric/Behavioral:  Negative for altered mental status and disorientation.       Objective:     Physical Exam   Constitutional: She is oriented to person, place, and time. She appears well-developed. She is cooperative.  Non-toxic appearance. She does not appear ill. No distress.      Comments:Patient sits comfortably in exam chair. Answers questions in complete sentences. Does not show any signs of distress or discoloration.      HENT:   Head: Normocephalic and atraumatic.   Ears:   Right Ear: Hearing, tympanic membrane, external ear and ear canal normal. impacted cerumen  Left Ear: Hearing, tympanic membrane, external ear and ear canal normal. impacted cerumen  Nose: Nose normal. No mucosal edema, rhinorrhea, nasal deformity or congestion. No epistaxis. Right sinus exhibits no maxillary sinus tenderness and no frontal sinus tenderness. Left sinus exhibits no maxillary sinus tenderness and no frontal sinus tenderness. "   Mouth/Throat: Uvula is midline and mucous membranes are normal. No trismus in the jaw. Normal dentition. No uvula swelling. Posterior oropharyngeal erythema present. No oropharyngeal exudate or posterior oropharyngeal edema.   Eyes: Conjunctivae and lids are normal. No scleral icterus.   Neck: Trachea normal and phonation normal. Neck supple. No edema present. No erythema present. No neck rigidity present.   Cardiovascular: Normal rate, regular rhythm, normal heart sounds and normal pulses.   Pulmonary/Chest: Effort normal and breath sounds normal. No stridor. No respiratory distress. She has no decreased breath sounds. She has no wheezes. She has no rhonchi. She has no rales. She exhibits no tenderness.   Abdominal: Normal appearance.   Musculoskeletal: Normal range of motion.         General: No deformity. Normal range of motion.   Lymphadenopathy:     She has no cervical adenopathy.        Right cervical: No superficial cervical, no deep cervical and no posterior cervical adenopathy present.       Left cervical: No superficial cervical, no deep cervical and no posterior cervical adenopathy present.   Neurological: She is alert and oriented to person, place, and time. She exhibits normal muscle tone. Coordination normal.   Skin: Skin is warm, dry, intact, not diaphoretic and not pale.   Psychiatric: Her speech is normal and behavior is normal. Judgment and thought content normal.   Nursing note and vitals reviewed.    Results for orders placed or performed in visit on 02/06/24   SARS Coronavirus 2 Antigen, POCT Manual Read   Result Value Ref Range    SARS Coronavirus 2 Antigen Negative Negative     Acceptable Yes    POCT Strep A, Molecular   Result Value Ref Range    Molecular Strep A, POC Negative Negative     Acceptable Yes    POCT Influenza A/B MOLECULAR   Result Value Ref Range    POC Molecular Influenza A Ag Negative Negative, Not Reported    POC Molecular Influenza B Ag  Negative Negative, Not Reported     Acceptable Yes        Assessment:     1. Screen for STD (sexually transmitted disease)    2. Sore throat    3. Acute viral pharyngitis    4. Gastroesophageal reflux disease, unspecified whether esophagitis present        Plan:       Screen for STD (sexually transmitted disease)  -     HIV 1/2 Ag/Ab (4th Gen)  -     RPR  -     HERPES SIMPLEX VIRUS (HSV) TYPE 1 & 2 DNA BY PCR  -     VAGINOSIS SCREEN BY DNA PROBE  -     C. trachomatis/N. gonorrhoeae by AMP DNA [facility]; Vagina    Sore throat  -     SARS Coronavirus 2 Antigen, POCT Manual Read  -     POCT Strep A, Molecular  -     POCT Influenza A/B MOLECULAR    Acute viral pharyngitis    Gastroesophageal reflux disease, unspecified whether esophagitis present                Patient Instructions   - Take 20 mg of pepcid at night for acid reflux. Acid reflux friendly diet attached.       PLEASE READ YOUR DISCHARGE INSTRUCTIONS ENTIRELY AS IT CONTAINS IMPORTANT INFORMATION.     STD Screening     You were tested for sexual transmitted diseases today, we will call you in 3-7 days with the results of the testing. If you need more medication when the labs result come in, we will call you and phone them in for you.  Please remain abstinent until further notice.      REMEMBER WEAR CONDOMS AND GET TESTED OFTEN.            IF POSITIVE:  Notify sexual partners of the need for testing.    NO sexual intercourse until given all lab results and appropriate treatment.      Complete ALL medications prescribed (if required).  Please supplement with OTC probiotics and yogurt if prescribed antibiotics.   NO sexual intercourse for 7-14 days after treatment (if required).      Retest to ensure infection has cleared - there are infections that require more agressive treatment.  Retest for all in 6 weeks (if still have symptoms) and again in 6 months to ensure true negative results.          TODAY'S TESTING WILL GIVE NO CREDIBLE INFORMATION IF  YOU HAVE UNPROTECTED SEXUAL ACTIVITIES GOING FORWARD    - Rest.    - Drink plenty of fluids. Increasing your fluid intake will help loosen up mucous.  - Viral upper respiratory infections typically run their course in 10-14 days.      - Chloraseptic throat spray can help numb the throat.     - Warm salt water gargles can help with sore throat.  - Warm tea with honey can help with sore throat and cough. Honey is a natural cough suppressant.      - You must understand that you have received an Urgent Care treatment only and that you may be released before all of your medical problems are known or treated.   - You, the patient, will arrange for follow up care as instructed.   - If your condition worsens or fails to improve we recommend that you receive another evaluation at the ER immediately or contact your PCP to discuss your concerns or return here.   - Follow up with your PCP or specialty clinic as directed in the next 1-2 weeks if not improved or as needed.  You can call [phone] to schedule an appointment with the appropriate provider.    If your symptoms do not improve or worsen, go to the emergency room immediately.

## 2024-02-07 LAB — HIV 1+2 AB+HIV1 P24 AG SERPL QL IA: NORMAL

## 2024-02-08 LAB — RPR SER QL: NORMAL

## 2024-02-09 ENCOUNTER — TELEPHONE (OUTPATIENT)
Dept: URGENT CARE | Facility: CLINIC | Age: 44
End: 2024-02-09
Payer: COMMERCIAL

## 2024-02-09 LAB
HSV-1 DNA BY PCR: NEGATIVE
HSV-2 DNA BY PCR: NEGATIVE

## 2024-02-10 LAB
BACTERIAL VAGINOSIS DNA: POSITIVE
C TRACH DNA SPEC QL NAA+PROBE: NOT DETECTED
CANDIDA GLABRATA DNA: NEGATIVE
CANDIDA KRUSEI DNA: NEGATIVE
CANDIDA RRNA VAG QL PROBE: NEGATIVE
N GONORRHOEA DNA SPEC QL NAA+PROBE: NOT DETECTED
T VAGINALIS RRNA GENITAL QL PROBE: NEGATIVE

## 2024-02-14 ENCOUNTER — TELEPHONE (OUTPATIENT)
Dept: URGENT CARE | Facility: CLINIC | Age: 44
End: 2024-02-14
Payer: COMMERCIAL

## 2024-02-14 DIAGNOSIS — N76.0 BV (BACTERIAL VAGINOSIS): Primary | ICD-10-CM

## 2024-02-14 DIAGNOSIS — B96.89 BV (BACTERIAL VAGINOSIS): Primary | ICD-10-CM

## 2024-02-14 RX ORDER — METRONIDAZOLE 500 MG/1
500 TABLET ORAL 2 TIMES DAILY
Qty: 14 TABLET | Refills: 0 | Status: SHIPPED | OUTPATIENT
Start: 2024-02-14 | End: 2024-02-21

## 2024-02-14 NOTE — TELEPHONE ENCOUNTER
I spoke with patient and reported result positive for BV.  Send Rx for Flagyl b.i.d. for 7 days.  No alcohol with this med or within 3 days of finishing.  All other testing negative.  Advised recheck with problems

## 2024-03-05 ENCOUNTER — OFFICE VISIT (OUTPATIENT)
Dept: URGENT CARE | Facility: CLINIC | Age: 44
End: 2024-03-05
Payer: COMMERCIAL

## 2024-03-05 VITALS
DIASTOLIC BLOOD PRESSURE: 74 MMHG | TEMPERATURE: 98 F | WEIGHT: 188.06 LBS | RESPIRATION RATE: 16 BRPM | HEART RATE: 85 BPM | HEIGHT: 63 IN | BODY MASS INDEX: 33.32 KG/M2 | OXYGEN SATURATION: 97 % | SYSTOLIC BLOOD PRESSURE: 107 MMHG

## 2024-03-05 DIAGNOSIS — Z11.3 SCREENING FOR STD (SEXUALLY TRANSMITTED DISEASE): ICD-10-CM

## 2024-03-05 DIAGNOSIS — J02.9 SORE THROAT: Primary | ICD-10-CM

## 2024-03-05 LAB
CTP QC/QA: YES
CTP QC/QA: YES
HBV SURFACE AB SER-ACNC: 245.87 MIU/ML
HBV SURFACE AB SER-ACNC: REACTIVE M[IU]/ML
HBV SURFACE AG SERPL QL IA: NORMAL
HCV AB SERPL QL IA: NORMAL
HIV 1+2 AB+HIV1 P24 AG SERPL QL IA: NORMAL
MOLECULAR STREP A: NEGATIVE
SARS-COV-2 AG RESP QL IA.RAPID: NEGATIVE

## 2024-03-05 PROCEDURE — 87651 STREP A DNA AMP PROBE: CPT | Mod: QW,S$GLB,, | Performed by: FAMILY MEDICINE

## 2024-03-05 PROCEDURE — 86706 HEP B SURFACE ANTIBODY: CPT | Performed by: FAMILY MEDICINE

## 2024-03-05 PROCEDURE — 87389 HIV-1 AG W/HIV-1&-2 AB AG IA: CPT | Performed by: FAMILY MEDICINE

## 2024-03-05 PROCEDURE — 86592 SYPHILIS TEST NON-TREP QUAL: CPT | Performed by: FAMILY MEDICINE

## 2024-03-05 PROCEDURE — 87591 N.GONORRHOEAE DNA AMP PROB: CPT | Performed by: FAMILY MEDICINE

## 2024-03-05 PROCEDURE — 87811 SARS-COV-2 COVID19 W/OPTIC: CPT | Mod: QW,S$GLB,, | Performed by: FAMILY MEDICINE

## 2024-03-05 PROCEDURE — 86696 HERPES SIMPLEX TYPE 2 TEST: CPT | Performed by: FAMILY MEDICINE

## 2024-03-05 PROCEDURE — 86803 HEPATITIS C AB TEST: CPT | Performed by: FAMILY MEDICINE

## 2024-03-05 PROCEDURE — 99214 OFFICE O/P EST MOD 30 MIN: CPT | Mod: S$GLB,,, | Performed by: FAMILY MEDICINE

## 2024-03-05 PROCEDURE — 87340 HEPATITIS B SURFACE AG IA: CPT | Performed by: FAMILY MEDICINE

## 2024-03-05 NOTE — PATIENT INSTRUCTIONS
Will call with results  Will treat per results of labs  Follow-up with PCP at 2 months and 6 months for repeat labs    Follow up with PCP      You must understand that you have received treatment at an Urgent Care facility only, and that you may be  released before all of your medical problems are known or treated. Urgent Care facilities are not equipped to  handle life threatening emergencies. It is recommended that you seek care at an Emergency Department for  further evaluation of worsening or concerning symptoms, or possibly life threatening conditions as  discussed.      If you develop chest pain, shortness of breath, throat swelling, tongue swelling, lightheadedness or any other causes for concern, proceed to ER.

## 2024-03-05 NOTE — PROGRESS NOTES
"Subjective:      Patient ID: Yanna Kwong is a 44 y.o. female.    Vitals:  height is 5' 2.99" (1.6 m) and weight is 85.3 kg (188 lb 0.8 oz). Her oral temperature is 98.2 °F (36.8 °C). Her blood pressure is 107/74 and her pulse is 85. Her respiration is 16 and oxygen saturation is 97%.     Chief Complaint: Sore Throat (Entered by patient)    Patient reports with a sore throat that presented on Friday and gradually worsened by Saturday, she reports without taking anything for hier current symptoms.     Pt is a 45 yo F who presents with sore throat since Saturday.  It does not hurt to swallow.  She denies fever body aches and chills.  She had an unprotected oral sex encounter with a friend on Friday.  Sore throat presented shortly after.  Patient is concerned for STD in her throat as well as other STD's    Sore Throat   This is a new problem. The current episode started in the past 7 days. The problem has been gradually worsening. Neither side of throat is experiencing more pain than the other. There has been no fever. The pain is at a severity of 2/10. The pain is mild. Pertinent negatives include no abdominal pain, congestion, coughing, diarrhea, drooling, ear discharge, ear pain, headaches, hoarse voice, plugged ear sensation, neck pain, shortness of breath, stridor, swollen glands, trouble swallowing or vomiting. She has had no exposure to strep or mono. She has tried nothing for the symptoms.       HENT:  Positive for sore throat. Negative for ear pain, ear discharge, drooling, congestion and trouble swallowing.    Neck: Negative for neck pain.   Respiratory:  Negative for cough, shortness of breath and stridor.    Gastrointestinal:  Negative for abdominal pain, vomiting and diarrhea.   Neurological:  Negative for headaches.      Objective:     Physical Exam   Constitutional: She is oriented to person, place, and time. She appears well-developed. She is cooperative.  Non-toxic appearance. She does not appear " ill. No distress.   HENT:   Head: Normocephalic and atraumatic.   Ears:   Right Ear: Hearing, tympanic membrane, external ear and ear canal normal.   Left Ear: Hearing, tympanic membrane, external ear and ear canal normal.   Nose: Nose normal. No mucosal edema, rhinorrhea or nasal deformity. No epistaxis. Right sinus exhibits no maxillary sinus tenderness and no frontal sinus tenderness. Left sinus exhibits no maxillary sinus tenderness and no frontal sinus tenderness.   Mouth/Throat: Uvula is midline, oropharynx is clear and moist and mucous membranes are normal. No trismus in the jaw. Normal dentition. No uvula swelling. No oropharyngeal exudate, posterior oropharyngeal edema or posterior oropharyngeal erythema.   Eyes: Conjunctivae and lids are normal. No scleral icterus.   Neck: Trachea normal and phonation normal. Neck supple. No edema present. No erythema present. No neck rigidity present.   Cardiovascular: Normal rate, regular rhythm, normal heart sounds and normal pulses.   Pulmonary/Chest: Effort normal and breath sounds normal. No respiratory distress. She has no decreased breath sounds. She has no rhonchi.   Abdominal: Normal appearance.   Musculoskeletal: Normal range of motion.         General: No deformity. Normal range of motion.   Neurological: She is alert and oriented to person, place, and time. She exhibits normal muscle tone. Coordination normal.   Skin: Skin is warm, dry, intact, not diaphoretic and not pale.   Psychiatric: Her speech is normal and behavior is normal. Judgment and thought content normal.   Nursing note and vitals reviewed.    Results for orders placed or performed in visit on 03/05/24   SARS Coronavirus 2 Antigen, POCT Manual Read   Result Value Ref Range    SARS Coronavirus 2 Antigen Negative Negative     Acceptable Yes    POCT Strep A, Molecular   Result Value Ref Range    Molecular Strep A, POC Negative Negative     Acceptable Yes           Assessment:     1. Sore throat    2. Screening for STD (sexually transmitted disease)        Plan:       Sore throat  -     SARS Coronavirus 2 Antigen, POCT Manual Read  -     Cancel: POCT Influenza A/B MOLECULAR  -     C.trach/N.gonor AMP RNA  -     POCT Strep A, Molecular    Screening for STD (sexually transmitted disease)  -     RPR  -     HIV 1/2 Ag/Ab (4th Gen)  -     Hepatitis C Ab  -     HEPATITIS B SURFACE ANTIGEN  -     Hepatitis B Surface Ab, Qualitative  -     HERPES SIMPLEX 1&2 IGG  -     C.trach/N.gonor AMP RNA                Patient Instructions   Will call with results  Will treat per results of labs  Follow-up with PCP at 2 months and 6 months for repeat labs    Follow up with PCP      You must understand that you have received treatment at an Urgent Care facility only, and that you may be  released before all of your medical problems are known or treated. Urgent Care facilities are not equipped to  handle life threatening emergencies. It is recommended that you seek care at an Emergency Department for  further evaluation of worsening or concerning symptoms, or possibly life threatening conditions as  discussed.      If you develop chest pain, shortness of breath, throat swelling, tongue swelling, lightheadedness or any other causes for concern, proceed to ER.

## 2024-03-06 LAB
HSV1 IGG SERPL QL IA: NEGATIVE
HSV2 IGG SERPL QL IA: NEGATIVE
RPR SER QL: NORMAL

## 2024-03-07 ENCOUNTER — TELEPHONE (OUTPATIENT)
Dept: URGENT CARE | Facility: CLINIC | Age: 44
End: 2024-03-07
Payer: COMMERCIAL

## 2024-03-07 LAB
C TRACH RRNA SPEC QL NAA+PROBE: NEGATIVE
N GONORRHOEA RRNA SPEC QL NAA+PROBE: NEGATIVE
N.GONORROHEAE, AMP RNA SOURCE: NORMAL
SPECIMEN SOURCE: NORMAL

## 2024-03-07 NOTE — TELEPHONE ENCOUNTER
Call placed to speak with the patient's secondary to results., all unremarkable; pending results for chlamydia and gonorrhea.  Patient verbalized understanding.  Reports complaint of previous sore throat has greatly improved.  Denies fever, difficulty swallowing.  Advised patient to return if symptoms should return and or follow-up with her PCP; verbalized understanding. Advised she will be notified regarding pending results.    Results for orders placed or performed in visit on 03/05/24   RPR   Result Value Ref Range    RPR Non-reactive Non-reactive   HIV 1/2 Ag/Ab (4th Gen)   Result Value Ref Range    HIV 1/2 Ag/Ab Non-reactive Non-reactive   Hepatitis C Ab   Result Value Ref Range    Hepatitis C Ab Non-reactive Non-reactive   HEPATITIS B SURFACE ANTIGEN   Result Value Ref Range    Hepatitis B Surface Ag Non-reactive Non-reactive   Hepatitis B Surface Ab, Qualitative   Result Value Ref Range    Hep B S Ab 245.87 mIU/mL    Hep B S Ab Reactive    HERPES SIMPLEX 1&2 IGG   Result Value Ref Range    HSV 1 IgG Negative Negative    HSV 2 IgG Negative Negative   C.trach/N.gonor AMP RNA   Result Value Ref Range    C. trach. RNA Source Throat     N.gonorroheae, RNA Source Throat    SARS Coronavirus 2 Antigen, POCT Manual Read   Result Value Ref Range    SARS Coronavirus 2 Antigen Negative Negative     Acceptable Yes    POCT Strep A, Molecular   Result Value Ref Range    Molecular Strep A, POC Negative Negative     Acceptable Yes

## 2024-09-04 ENCOUNTER — TELEPHONE (OUTPATIENT)
Dept: OBSTETRICS AND GYNECOLOGY | Facility: CLINIC | Age: 44
End: 2024-09-04
Payer: COMMERCIAL

## 2024-09-04 ENCOUNTER — PATIENT MESSAGE (OUTPATIENT)
Dept: INTERNAL MEDICINE | Facility: CLINIC | Age: 44
End: 2024-09-04
Payer: COMMERCIAL

## 2024-09-04 DIAGNOSIS — Z12.31 ENCOUNTER FOR SCREENING MAMMOGRAM FOR BREAST CANCER: Primary | ICD-10-CM

## 2024-09-04 NOTE — TELEPHONE ENCOUNTER
----- Message from Jacquelyn Alcantara sent at 9/4/2024  2:04 PM CDT -----  Regarding: appt  Contact: [phone]  Pt is calling to speak to someone in the office to scheduled her f/u appt for mammao   for; no available appts in Epic. Please call to advise. [phone].     Patient's DX:     Additional Info:    pt requesting orders for Mammao

## 2024-09-16 ENCOUNTER — OFFICE VISIT (OUTPATIENT)
Dept: OBSTETRICS AND GYNECOLOGY | Facility: CLINIC | Age: 44
End: 2024-09-16
Payer: COMMERCIAL

## 2024-09-16 ENCOUNTER — HOSPITAL ENCOUNTER (OUTPATIENT)
Dept: RADIOLOGY | Facility: HOSPITAL | Age: 44
Discharge: HOME | End: 2024-09-16
Attending: OBSTETRICS & GYNECOLOGY
Payer: COMMERCIAL

## 2024-09-16 VITALS — BODY MASS INDEX: 32.94 KG/M2 | HEIGHT: 62 IN | WEIGHT: 179 LBS

## 2024-09-16 VITALS
SYSTOLIC BLOOD PRESSURE: 105 MMHG | BODY MASS INDEX: 31.75 KG/M2 | DIASTOLIC BLOOD PRESSURE: 68 MMHG | WEIGHT: 179.19 LBS

## 2024-09-16 DIAGNOSIS — Z01.419 WELL WOMAN EXAM WITH ROUTINE GYNECOLOGICAL EXAM: Primary | ICD-10-CM

## 2024-09-16 DIAGNOSIS — Z12.4 ROUTINE CERVICAL SMEAR: ICD-10-CM

## 2024-09-16 DIAGNOSIS — Z12.31 ENCOUNTER FOR SCREENING MAMMOGRAM FOR BREAST CANCER: ICD-10-CM

## 2024-09-16 PROCEDURE — 3078F DIAST BP <80 MM HG: CPT | Mod: CPTII,S$GLB,, | Performed by: OBSTETRICS & GYNECOLOGY

## 2024-09-16 PROCEDURE — 3074F SYST BP LT 130 MM HG: CPT | Mod: CPTII,S$GLB,, | Performed by: OBSTETRICS & GYNECOLOGY

## 2024-09-16 PROCEDURE — 1160F RVW MEDS BY RX/DR IN RCRD: CPT | Mod: CPTII,S$GLB,, | Performed by: OBSTETRICS & GYNECOLOGY

## 2024-09-16 PROCEDURE — 3008F BODY MASS INDEX DOCD: CPT | Mod: CPTII,S$GLB,, | Performed by: OBSTETRICS & GYNECOLOGY

## 2024-09-16 PROCEDURE — 88175 CYTOPATH C/V AUTO FLUID REDO: CPT | Performed by: OBSTETRICS & GYNECOLOGY

## 2024-09-16 PROCEDURE — 3044F HG A1C LEVEL LT 7.0%: CPT | Mod: CPTII,S$GLB,, | Performed by: OBSTETRICS & GYNECOLOGY

## 2024-09-16 PROCEDURE — 77063 BREAST TOMOSYNTHESIS BI: CPT | Mod: TC

## 2024-09-16 PROCEDURE — 99999 PR PBB SHADOW E&M-EST. PATIENT-LVL III: CPT | Mod: PBBFAC,,, | Performed by: OBSTETRICS & GYNECOLOGY

## 2024-09-16 PROCEDURE — 77067 SCR MAMMO BI INCL CAD: CPT | Mod: TC

## 2024-09-16 PROCEDURE — 99396 PREV VISIT EST AGE 40-64: CPT | Mod: S$GLB,,, | Performed by: OBSTETRICS & GYNECOLOGY

## 2024-09-16 PROCEDURE — 1159F MED LIST DOCD IN RCRD: CPT | Mod: CPTII,S$GLB,, | Performed by: OBSTETRICS & GYNECOLOGY

## 2024-09-16 NOTE — PROGRESS NOTES
OBSTETRIC HISTORY:   OB History          0    Para        Term   0            AB        Living             SAB        IAB        Ectopic        Multiple        Live Births                    COMPREHENSIVE GYN HISTORY:  PAP History: Denies abnormal Paps.  Infection History: Denies STDs. Denies PID.  Benign History: Denies uterine fibroids. Denies ovarian cysts. Denies endometriosis.   Cancer History: Denies cervical cancer. Denies uterine cancer or hyperplasia. Denies ovarian cancer. Denies vulvar cancer or pre-cancer. Denies vaginal cancer or pre-cancer. Denies breast cancer. Denies colon cancer.  Sexual Activity History:   reports that she does not engage in sexual activity.   Menstrual History: Every 21-23 days, flows for 5 days. Moderate to heavy flow.  Dysmenorrhea History: Reports severe dysmenorrhea. 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  Patient's last menstrual period was 2024.   Patient is  here for her annual gynecologic exam.  She has no GYN complaints. She denies bladder, breast and bowel complaints.    ROS:  GENERAL: No weight gain or weight loss.   CHEST: No shortness of breath.   ABDOMEN: No abdominal pain, constipation, diarrhea, nausea, vomiting or rectal bleeding.   URINARY: No frequency, dysuria, hematuria, or burning on urination.  REPRODUCTIVE: See HPI.   BREASTS: No breast pain, lumps, or nipple discharge.   PSYCHIATRIC: No depression or anxiety.    PE:   /68   Wt 81.3 kg (179 lb 3 oz)   LMP 2024   BMI 31.75 kg/m²   APPEARANCE: Well nourished, well developed, in no acute distress.  NECK: Neck symmetric without  thyromegaly.  NODES: No inguinal, cervical lymph node enlargement.  CHEST: Lungs clear to auscultation.  HEART: Regular rate and rhythm, no murmurs, rubs or gallops.  ABDOMEN: Soft. No tenderness or masses. No hernias.  BREASTS: Symmetrical, no skin changes or visible lesions. No palpable masses,  nipple discharge or adenopathy bilaterally.  PELVIC:   VULVA: No lesions. Normal female genitalia.  URETHRAL MEATUS: Normal size and location, no lesions, no prolapse.  URETHRA: No masses, tenderness, prolapse or scarring.  VAGINA: Moist and well rugated, bloody discharge, no significant cystocele or rectocele.  CERVIX: No lesions and discharge.  UTERUS: Normal size, regular shape, mobile, non-tender, bladder base nontender.  ADNEXA: No masses or tenderness.    PROCEDURES:  Pap smear  HRHPV    Assessment:  Normal Gynecologic Exam    Recommendations routine screening and no risk factors:  Mammogram at age 40  Colonoscopy age 45  Bone density age 65  Return to clinic as needed for any problems.  Return to clinic in one year. It is recommended to have a physician breast exam and pelvic exam annually. This is different than doing a Pap smear.         As of April 1, 2021, the Cures Act has been passed nationally. This new law requires that all doctors progress notes, lab results, pathology reports and radiology reports be released IMMEDIATELY to the patient in the patient portal. That means that the results are released to you at the EXACT same time they are released to me. Therefore, with all of the patients that I have I am not able to reply to each patient exactly when the results come in. So there will be a delay from when you see the results to when I see them and have time to come up with a response to send you. Also I only see these results when I am on the computer at work. So if the results come in over the weekend or after 5 pm of a work day, I will not see them until the next business day. As you can tell, this is a challenge as a physician to give every patient the quick response they hope for and deserve. So please be patient!     Thanks for understanding,

## 2024-09-20 LAB
FINAL PATHOLOGIC DIAGNOSIS: NORMAL
Lab: NORMAL

## 2025-04-04 ENCOUNTER — TELEPHONE (OUTPATIENT)
Dept: ORTHOPEDICS | Facility: CLINIC | Age: 45
End: 2025-04-04
Payer: COMMERCIAL

## 2025-04-04 DIAGNOSIS — R52 PAIN: Primary | ICD-10-CM

## 2025-04-07 ENCOUNTER — HOSPITAL ENCOUNTER (OUTPATIENT)
Dept: RADIOLOGY | Facility: OTHER | Age: 45
Discharge: HOME | End: 2025-04-07
Attending: SPECIALIST/TECHNOLOGIST
Payer: COMMERCIAL

## 2025-04-07 ENCOUNTER — OFFICE VISIT (OUTPATIENT)
Dept: ORTHOPEDICS | Facility: CLINIC | Age: 45
End: 2025-04-07
Payer: COMMERCIAL

## 2025-04-07 DIAGNOSIS — R52 PAIN: ICD-10-CM

## 2025-04-07 DIAGNOSIS — S63.630A SPRAIN OF INTERPHALANGEAL JOINT OF RIGHT INDEX FINGER, INITIAL ENCOUNTER: Primary | ICD-10-CM

## 2025-04-07 PROCEDURE — 73130 X-RAY EXAM OF HAND: CPT | Mod: TC,FY,RT

## 2025-04-07 PROCEDURE — 99204 OFFICE O/P NEW MOD 45 MIN: CPT | Mod: S$GLB,,, | Performed by: SPECIALIST/TECHNOLOGIST

## 2025-04-07 PROCEDURE — 73130 X-RAY EXAM OF HAND: CPT | Mod: 26,RT,, | Performed by: RADIOLOGY

## 2025-04-07 PROCEDURE — 99999 PR PBB SHADOW E&M-EST. PATIENT-LVL III: CPT | Mod: PBBFAC,,, | Performed by: SPECIALIST/TECHNOLOGIST

## 2025-04-07 PROCEDURE — 1159F MED LIST DOCD IN RCRD: CPT | Mod: CPTII,S$GLB,, | Performed by: SPECIALIST/TECHNOLOGIST

## 2025-04-07 NOTE — PROGRESS NOTES
Patient ID: Yanna Kwong is a 45 y.o. female.    Chief Complaint: Pain and Swelling of the Right Hand    History of Present Illness    CHIEF COMPLAINT:  - Right middle finger pain and swelling    HPI:  Yanna presents with right middle finger swelling and tightness that began on Sunday after work. She fell asleep while watching television and woke up with the finger feeling tight. Her right middle finger appears visibly different compared to the others, with noticeable swelling. She reports tenderness along the lateral aspect of the finger, particularly when pressure is applied or when moving the finger downward. The swelling causes a different sensation in the affected area, with irritation more prominent from the medial joint up to the fingertip. She has difficulty performing work-related tasks, such as starting IVs, due to the finger issue.    This is not the first occurrence of such an issue; she had a similar problem with her left hand in 2016, which required PT. Yanna, who works as a nurse in the ED, attempted to tape the finger herself but found it challenging due to the time elapsed since her previous experience.    She denies any tingling in the affected finger. She denies any fractures or mallet finger.    PREVIOUS TREATMENTS:  - PT: Conducted at [facility] in 2016 for a similar issue on the left hand, provided relief. She saw Kandi for this treatment.    WORK STATUS:  - Nurse in the ED (Emergency Department)  - Recently started working downstairs, with last shifts in January  - Experiencing difficulty performing work tasks, such as starting IVs, due to finger issue      ROS:  ROS as indicated in HPI.          Hand/Wrist Musculoskeletal Exam    Inspection    Right      Erythema: none      Ecchymosis: none      Edema: none      Deformity: none    Left      Erythema: none      Ecchymosis: none      Edema: none      Deformity: none    Palpation    Left      Index tenderness to palpation: distal  interphalangeal    Range of Motion        Range of motion additional comments: Able to make a composite fist.    Neurovascular    Right       Radial pulse: normal      Capillary refill: brisk      Ulnar nerve sensory distribution: normal      Median nerve sensory distribution: normal      Superficial radial nerve sensory distribution: normal    Left       Radial pulse: normal      Capillary refill: brisk      Ulnar nerve sensory distribution: normal      Median nerve sensory distribution: normal      Superficial radial nerve sensory distribution: normal    Neurovascular additional comments:         Physical Exam    Musculoskeletal: Tenderness along the lateral aspect of the finger. Flexor tendons intact. Extensor tendons intact.  IMAGING:  - XR Right Middle Finger           IMAGING  Right hand XR  Personal interpretation of the XR reveals no signs of fractures or dislocations      PLAN  Assessment & Plan    45-year-old female presents to clinic today with a right index DIP joint sprain.  - Applied duong strap to patient's affected fingers to keep them together.  - Continue range of motion exercises.  - Use putty for hand exercises at home.  - Referred to PT for finger rehabilitation.  - Yanna to attend 1-2 sessions per week.              Anatoly Yanez PA-C, ATC  Hand and Upper Extremity   [facility]    This note was generated with the assistance of ambient listening technology. Verbal consent was obtained by the patient and accompanying visitor(s) for the recording of patient appointment to facilitate this note. I attest to having reviewed and edited the generated note for accuracy, though some syntax or spelling errors may persist. Please contact the author of this note for any clarification.

## 2025-04-10 ENCOUNTER — CLINICAL SUPPORT (OUTPATIENT)
Dept: REHABILITATION | Facility: HOSPITAL | Age: 45
End: 2025-04-10
Attending: SPECIALIST/TECHNOLOGIST
Payer: COMMERCIAL

## 2025-04-10 DIAGNOSIS — S63.630A SPRAIN OF INTERPHALANGEAL JOINT OF RIGHT INDEX FINGER, INITIAL ENCOUNTER: ICD-10-CM

## 2025-04-10 DIAGNOSIS — R29.898 DECREASED PINCH STRENGTH: ICD-10-CM

## 2025-04-10 DIAGNOSIS — M79.89 SWELLING OF FINGER, RIGHT: ICD-10-CM

## 2025-04-10 DIAGNOSIS — M79.644 FINGER PAIN, RIGHT: Primary | ICD-10-CM

## 2025-04-10 PROCEDURE — 97110 THERAPEUTIC EXERCISES: CPT

## 2025-04-10 PROCEDURE — 97018 PARAFFIN BATH THERAPY: CPT

## 2025-04-10 PROCEDURE — 97165 OT EVAL LOW COMPLEX 30 MIN: CPT

## 2025-04-10 NOTE — PATIENT INSTRUCTIONS
"[facility] - OCCUPATIONAL THERAPY  HOME EXERCISE PROGRAM   Soak hand in hot water, use hot wet compress or run under hot water for 5-10 min in the morning or before the exercises to decrease stiffness.     Massage with ice at night x 5 min to decrease pain, inflammation, swelling; edema finger sleeve at night.     Complete the following exercises with 10 repetitions each, 3-4x/day.     AROM: DIP Flexion / Extension  Pinch middle knuckle to prevent bending. Bend end knuckle until stretch is felt.   Hold 3 seconds. Relax. Straighten finger as far as possible.    AROM: PIP Flexion / Extension  Pinch bottom knuckle  to prevent bending. Actively bend middle knuckle until stretch is felt.   Hold 3 seconds. Relax. Straighten finger as far as possible.    AROM: Isolated PIP Flexion  Bend only middle joint of your finger, keeping other fingers straight with other hand.    AROM: Isolated MCP Flexion / Extension ("Wave")   Bend only your large, bottom knuckles. Hold 3 seconds. Keep the tips of your fingers straight. Straighten fingers.    AROM: Isolated IPJ Flexion / Extension ("Hook")MIAH TAPE   Bend only your middle and end knuckles. Hold 3 seconds.   Straighten your fingers.     AROM: MCP and PIP Flexion / Extension ("Straight Fist")MIAH TAPE   Bend your bottom and middle knuckles, keeping the tips of your fingers straight. Try to touch the pads of your fingers on your palm. Hold 3 seconds. Straighten your fingers.     AROM: Composite Flexion / Extension ("Full Fist")MIAH TAPE   Bend every joint in your hand into a fist. Hold 3 seconds. Straighten your fingers.     AROM: Composte Extension ("Finger Lifts")  Lift your finger off of the table one at a time. Hold 3 seconds. Relax your finger.        Copyright © I. All rights reserved.     Therapist: TG Espinoza CHT    "

## 2025-04-10 NOTE — PROGRESS NOTES
Outpatient Rehab    Occupational Therapy Evaluation    Patient Name: Yanna Kwong  MRN: [id number]  YOB: 1980  Encounter Date: 4/10/2025    Therapy Diagnosis:   Encounter Diagnoses   Name Primary?    Sprain of interphalangeal joint of right index finger, initial encounter     Finger pain, right Yes    Decreased pinch strength     Swelling of finger, right      Physician: Allen Yanez PA-C    Physician Orders: Eval and Treat  Medical Diagnosis: Sprain of interphalangeal joint of right index finger, initial encounter    Visit # / Visits Authorized: 1 / 1  Insurance Authorization Period: 4/7/2025 to 12/31/2025  Date of Evaluation: 4/10/2025  Plan of Care Certification: 4/10/2025 to 5/22/25     Time In: 1115   Time Out: 1200  Total Time: 45   Total Billable Time:  45    Intake Outcome Measure for FOTO Survey    Therapist reviewed FOTO scores for Yanna Kwong on 4/10/2025.   FOTO report - see Media section or FOTO account episode details.     Intake Score:  % not assessed          Subjective   History of Present Illness  Yanna is a 45 y.o. female who reports to occupational therapy with a chief concern of finger pain and swelling, right.     The patient reports a medical diagnosis of S63.630A (ICD-10-CM) - Sprain of interphalangeal joint of right index finger, initial encounter.    Diagnostic tests related to this condition: X-ray.        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  She reports for evaluation and treatment of right index DIP sprain of collateral ligaments.  She is unable to identify any injury, just pain and swelling after waking up and stiffness noted. She reports some limitations with work activities like IV's, pulling on gloves, writing, typing.     Activities of Daily Living  Social history was obtained from Patient.      Pain     Patient reports a current pain level  of 0/10. Pain at best is reported as 0/10. Pain at worst is reported as 3/10.   Location: DIP joint right index  Clinical Progression (since onset): Stable  Pain Qualities: Tightness, Tenderness, Pressure  Pain-Relieving Factors: Activity modification, Rest  Pain-Aggravating Factors: Holding objects         Employment  Employment Status: Employed full-time   Works as nurse full time in [employer] ER      Past Medical History/Physical Systems Review:   Yanna Kwong  has a past medical history of Thyroid nodule.    Yanna Kwong  has a past surgical history that includes [place] tooth extraction; Breast biopsy (Right, 2016); and Breast biopsy (Right, 2021).    Yanna has a current medication list which includes the following prescription(s): ascorbic acid (vitamin c), b complex vitamins, biotin, cetirizine, cholecalciferol (vitamin d3), diclofenac, fish oil-omega-3 fatty acids, fluocinonide, folic acid, ketoconazole, ketoconazole, methocarbamol, multivitamin, omeprazole, tretinoin, and vitamin e.    Review of patient's allergies indicates:   Allergen Reactions    Vicodin [hydrocodone-acetaminophen] Other (See Comments)     tongue    Erythromycin Itching and Rash    Ilosone Rash        Objective      Right Hand Digit Swelling   Proximal Phalanx IP PIP Middle Phalanx DIP Distal Phalanx   Thumb     N/A N/A N/A     Index 5.9 cm N/A 5.8 cm 5.3 cm 4.7 cm 4.5 cm   Middle   N/A           Ring   N/A           Little   N/A                Digit 2 - Index Finger Active Range of Motion  Right Index Finger   Extension (deg) Flexion (deg) Pain   MCP 0 75     PIP 0 100     DIP 0 60     SALVADOR: 235    /Pinch Details  Assess pinch in 1-2 weeks as pain subsides     Treatment:  Therapeutic Exercise  TE 1: Blocking FDP, FDS, Isolated FDS glide, hook, wave, flat, full fist, digit ab/ad, digit extension x 10 reps each, 3x daily  TE 2: Reviewed modality use including MH prior to therex and ice massage at night for  "pain/swelling  TE 3: Provided LMB size AA for left small finger "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  TE 4: Provided 2 KT tape edema sleeves for night use.  Modalities  Paraffin Bath: Paraffin bath x 10 min to  hand to increase blood flow, circulation and tissue elasticity prior to therex.    Education  Education was done with Patient. The patient's learning style includes Pictures/video. The patient Verbalizes understanding.         Instructed patient in HEP and modality use for pain/stiffness including MH prior to exercises and CP or ice massage for nightly use to decrease pain, swelling and inflammation.       Time Entry(in minutes):  OT Evaluation (Low) Time Entry: 20  Paraffin Bath Time Entry: 10  Therapeutic Exercise Time Entry: 15    Assessment & Plan   Assessment  Yanna presents with a condition of Low complexity.   Presentation of Symptoms: Stable       Work Limitations: Job performance  Functional Limitations: Activity tolerance, Fine motor coordination, Manipulating objects, Pain with ADLs/IADLs, Range of motion         Patient Goal for Therapy (OT): Regain full movement, decrease swelling and pain  Prognosis: Excellent    Assessment Details: Patient would benefit from skilled OT services to increase finger ROM, decrease finger swelling and improve pinch strength for work/leisure activities     Plan  From an occupational therapy perspective, the patient would benefit from: Skilled Rehab Services    Planned therapy interventions include: Therapeutic exercise, Therapeutic activities, and Manual therapy.    Planned modalities to include: Contrast bath, Paraffin bath, and Ultrasound.        Visit Frequency: 1 times Per Week for 6 Weeks.       This plan was discussed with Patient.   Plan details: Will initiate OT 1x week x 6 weeks in pursuit of below listed goals     Patient's spiritual, cultural, and educational needs considered and patient agreeable to plan of care " and goals.     Goals:   Active       LTGs       1. Increase pinch strength 1-4 psi's for writing, typing, texting and FM activities        Start:  04/10/25    Expected End:  05/22/25            2.  Patient to return to work/leisure activities with pain of 3 or less        Start:  04/10/25    Expected End:  05/22/25               STGs       1. Patient to be IND with HEP & modalities for pain management         Start:  04/10/25    Expected End:  05/01/25            2. Increase ROM 3-10 degrees to increase functional hand use for ADLs/work/leisure activities        Start:  04/10/25    Expected End:  05/01/25            3. Decrease edema .1-.5mm to increase joint mobility/ flexibility for  functional hand use        Start:  04/10/25    Expected End:  05/01/25            4. Assess  pinch strength and update goals accordingly        Start:  04/10/25    Expected End:  05/01/25                Carolina Acosta, OT, CHT

## 2025-04-15 ENCOUNTER — CLINICAL SUPPORT (OUTPATIENT)
Dept: REHABILITATION | Facility: HOSPITAL | Age: 45
End: 2025-04-15
Payer: COMMERCIAL

## 2025-04-15 DIAGNOSIS — R29.898 DECREASED PINCH STRENGTH: ICD-10-CM

## 2025-04-15 DIAGNOSIS — M79.89 SWELLING OF FINGER, RIGHT: ICD-10-CM

## 2025-04-15 DIAGNOSIS — M79.644 FINGER PAIN, RIGHT: Primary | ICD-10-CM

## 2025-04-15 PROCEDURE — 97018 PARAFFIN BATH THERAPY: CPT

## 2025-04-15 PROCEDURE — 97110 THERAPEUTIC EXERCISES: CPT

## 2025-04-15 PROCEDURE — 97140 MANUAL THERAPY 1/> REGIONS: CPT

## 2025-04-15 NOTE — PATIENT INSTRUCTIONS
[facility]  OCCUPATIONAL THERAPY  HOME EXERCISE PROGRAM     Complete the following strengthening program 1x/day.   10 reps 1-2 x daily                 2 pt - thumb and index   3 pt - thumb, index, middle finger   _   Biscuit shape - key pinch - thumb and side of index     TG Espinoza, GINO  Certified Hand Therapist  Occupational Therapist

## 2025-04-15 NOTE — PROGRESS NOTES
Outpatient Rehab    Occupational Therapy Visit    Patient Name: Yanna Kwong  MRN: [id number]  YOB: 1980  Encounter Date: 4/15/2025    Therapy Diagnosis:   Encounter Diagnoses   Name Primary?    Finger pain, right Yes    Decreased pinch strength     Swelling of finger, right      Physician: Allen Yanez PA-C    Physician Orders: Eval and Treat  Medical Diagnosis: Sprain of interphalangeal joint of right index finger, initial encounter    Visit # / Visits Authorized: 1 / 20  Insurance Authorization Period: 4/14/2025 to 9/26/2026  Date of Evaluation: 4/10/25  Plan of Care Certification: 4/10/2025 to 5/22/25      Time In: 0750   Time Out: 0835  Total Time: 45   Total Billable Time:  45    FOTO: not assessed   Intake Score:  %  Survey Score 1:  %  Survey Score 2:  %         Subjective   A little bit of stiffness, or in the morning..  Pain reported as 2/10.      Objective        Right Pinch Strength   Trial 1 (lbs) Trial 2 (lbs) Trial 3 (lbs) Average (lbs) Pain   Lateral (Key Pinch) 11 11 11 11     Three Point (Three Jaw Mario) 11 11 11 11     Two Point (Tip to Tip) 8 8 8 8 Yes       Left Pinch Strength   Trial 1 (lbs) Trial 2 (lbs) Trial 3 (lbs) Average (lbs) Pain   Lateral (Key Pinch) 12 12 12 12     Three Point (Three Jaw Mario) 12 12 12 12     Two Point (Tip to Tip) 10 10 10 10                 Treatment:  Therapeutic Exercise  TE 1: Blocking FDP, FDS, isolated FDS glide, hook, wave, fist, digit extension and ab/ad x 10 reps each  TE 2: 4# green clothespin with pom poms for pinch strength  TE 3: JAKUB varigrip red 50% resistance for index press x 20 reps  TE 4: pink putty for gross , key, 2 and 3 pt pinch x 10 reps ; provided putty and upgraded HEP.  TE 5: Assessed pinch strength  Manual Therapy  MT 1: Massage to DIP collateral ligaments with mini vibrator and IASTYM tool to increase blood flow, circulation and to promote healing.  MT 2: ice massage following tx session  MT 3: KT tape to form edema  finger sleeve for night use with tan tape  Modalities  Paraffin Bath: Paraffin bath x 10 min to  hand to increase blood flow, circulation and tissue elasticity prior to therex.    Time Entry(in minutes):  Paraffin Bath Time Entry: 10  Manual Therapy Time Entry: 15  Therapeutic Exercise Time Entry: 20    Assessment & Plan   Assessment: DIP flexion slightly limited at endrange; progressing well; tolerated strengthening with putty exercises. Will progress as tolerated.  Evaluation/Treatment Tolerance: Patient tolerated treatment well    Patient will continue to benefit from skilled outpatient occupational therapy to address the deficits listed in the problem list box on initial evaluation, provide pt/family education and to maximize pt's level of independence in the home and community environment.     Patient's spiritual, cultural, and educational needs considered and patient agreeable to plan of care and goals.           Plan: Cont OT 1x week for ROM, edema and pain control    Goals:   Active       LTGs       1. Increase pinch strength 1-4 psi's for writing, typing, texting and FM activities  (Progressing)       Start:  04/10/25    Expected End:  05/22/25            2.  Patient to return to work/leisure activities with pain of 3 or less  (Progressing)       Start:  04/10/25    Expected End:  05/22/25               STGs       1. Patient to be IND with HEP & modalities for pain management   (Progressing)       Start:  04/10/25    Expected End:  05/01/25            2. Increase ROM 3-10 degrees to increase functional hand use for ADLs/work/leisure activities  (Progressing)       Start:  04/10/25    Expected End:  05/01/25            3. Decrease edema .1-.5mm to increase joint mobility/ flexibility for  functional hand use  (Progressing)       Start:  04/10/25    Expected End:  05/01/25            4. Assess  pinch strength and update goals accordingly  (Progressing)       Start:  04/10/25    Expected End:  05/01/25                 Carolina Acosta, OT, CHT

## 2025-04-23 ENCOUNTER — CLINICAL SUPPORT (OUTPATIENT)
Dept: REHABILITATION | Facility: HOSPITAL | Age: 45
End: 2025-04-23
Payer: COMMERCIAL

## 2025-04-23 DIAGNOSIS — M79.644 FINGER PAIN, RIGHT: Primary | ICD-10-CM

## 2025-04-23 DIAGNOSIS — R29.898 DECREASED PINCH STRENGTH: ICD-10-CM

## 2025-04-23 DIAGNOSIS — M79.89 SWELLING OF FINGER, RIGHT: ICD-10-CM

## 2025-04-23 PROCEDURE — 97018 PARAFFIN BATH THERAPY: CPT

## 2025-04-23 PROCEDURE — 97140 MANUAL THERAPY 1/> REGIONS: CPT

## 2025-04-23 PROCEDURE — 97110 THERAPEUTIC EXERCISES: CPT

## 2025-04-23 NOTE — PROGRESS NOTES
"    Outpatient Rehab    Occupational Therapy Visit    Patient Name: Yanna Kwong  MRN: [id number]  YOB: 1980  Encounter Date: 4/23/2025    Therapy Diagnosis:   Encounter Diagnoses   Name Primary?    Finger pain, right Yes    Decreased pinch strength     Swelling of finger, right      Physician: Allen Yanez PA-C    Physician Orders: Eval and Treat  Medical Diagnosis: Sprain of interphalangeal joint of right index finger, initial encounter    Visit # / Visits Authorized: 3 / 20  Insurance Authorization Period: 4/14/2025 to 9/26/2026  Date of Evaluation: 4/10/25  Plan of Care Certification: 4/10/2025 to 5/22/25      Time In: 0915   Time Out: 1000  Total Time: 45   Total Billable Time:  45 minutes     FOTO:  Intake Score:  %  Survey Score 1:  %  Survey Score 2:  %         Subjective   "I feel like it's getting a little better, still a little stiff in the mornings and i can feel it if I move a certain way".  Pain reported as 2/10.      Objective           Treatment:  Therapeutic Exercise  TE 1: Blocking FDP, FDS, isolated FDS glide, hook, wave, fist, digit extension and ab/ad x 10 reps each +adduction foam squeeze and RB for finger spreads  TE 2: 4# green clothespin with pom poms for pinch strength  TE 3: JAKUB varigrip red 50% resistance for index press x 20 reps  TE 4: pink putty for gross , key, 2 and 3 pt pinch x 10 reps ; + intrinsic scrape and raking  TE 5: isospheres x 2 min  TE 6: dowel fist<>hook rolls  Manual Therapy  MT 1: Massage to DIP collateral ligaments with mini vibrator and IASTYM tool to increase blood flow, circulation and to promote healing.  Modalities  Paraffin Bath: Patient tolerates paraffin w/ MHP to affected hand(s) for 10 min, pre-tx to decrease pain & increase tissue extensibility concurrent with assessment of deficits.    Time Entry(in minutes):  Paraffin Bath Time Entry: 10  Manual Therapy Time Entry: 15  Therapeutic Exercise Time Entry: 30    Assessment & Plan "   Assessment: Upon arrival, patient states that she still has some occasional pain as well as morning stiffness at the DIPJ of the RIF. She continues to do wll with all established ax as well as new ones added. She demos about 75 deg of DIP flex following tx session today. Will progress as tolerated  Evaluation/Treatment Tolerance: Patient tolerated treatment well    Patient will continue to benefit from skilled outpatient occupational therapy to address the deficits listed in the problem list box on initial evaluation, provide pt/family education and to maximize pt's level of independence in the home and community environment.     Patient's spiritual, cultural, and educational needs considered and patient agreeable to plan of care and goals.           Plan: Cont OT 1x week for ROM, edema and pain control    Goals:   Active       LTGs       1. Increase pinch strength 1-4 psi's for writing, typing, texting and FM activities  (Progressing)       Start:  04/10/25    Expected End:  05/22/25            2.  Patient to return to work/leisure activities with pain of 3 or less  (Progressing)       Start:  04/10/25    Expected End:  05/22/25               STGs       1. Patient to be IND with HEP & modalities for pain management   (Progressing)       Start:  04/10/25    Expected End:  05/01/25            2. Increase ROM 3-10 degrees to increase functional hand use for ADLs/work/leisure activities  (Progressing)       Start:  04/10/25    Expected End:  05/01/25            3. Decrease edema .1-.5mm to increase joint mobility/ flexibility for  functional hand use  (Progressing)       Start:  04/10/25    Expected End:  05/01/25            4. Assess  pinch strength and update goals accordingly  (Met)       Start:  04/10/25    Expected End:  05/01/25    Resolved:  04/23/25             Hafsa Zuleta, OT

## 2025-04-28 ENCOUNTER — CLINICAL SUPPORT (OUTPATIENT)
Dept: REHABILITATION | Facility: HOSPITAL | Age: 45
End: 2025-04-28
Payer: COMMERCIAL

## 2025-04-28 DIAGNOSIS — M79.89 SWELLING OF FINGER, RIGHT: ICD-10-CM

## 2025-04-28 DIAGNOSIS — M79.644 FINGER PAIN, RIGHT: Primary | ICD-10-CM

## 2025-04-28 DIAGNOSIS — R29.898 DECREASED PINCH STRENGTH: ICD-10-CM

## 2025-04-28 PROCEDURE — 97140 MANUAL THERAPY 1/> REGIONS: CPT

## 2025-04-28 PROCEDURE — 97018 PARAFFIN BATH THERAPY: CPT

## 2025-04-28 PROCEDURE — 97110 THERAPEUTIC EXERCISES: CPT

## 2025-04-28 NOTE — PROGRESS NOTES
Outpatient Rehab    Occupational Therapy Visit    Patient Name: Yanna Kwong  MRN: [id number]  YOB: 1980  Encounter Date: 4/28/2025    Therapy Diagnosis:   Encounter Diagnoses   Name Primary?    Finger pain, right Yes    Decreased pinch strength     Swelling of finger, right      Physician: Allen Yanez PA-C    Physician Orders: Eval and Treat  Medical Diagnosis: Sprain of interphalangeal joint of right index finger, initial encounter    Visit # / Visits Authorized: 3 / 20  Insurance Authorization Period: 4/14/2025 to 9/26/2026  Date of Evaluation: 4/10/25  Plan of Care Certification: 4/10/2025 to 5/22/25      Time In: 0748   Time Out: 0833  Total Time: 45   Total Billable Time:  45    FOTO:not assessed   Intake Score:  %  Survey Score 1:  %  Survey Score 2:  %        Assessed pinch 4/15/25  Subjective   No pain, just can't make a tight fist, feels loose.  Tender on top today (DIP crease, ORL ligament).  Pain reported as 0/10.      Objective                   Treatment:  Therapeutic Exercise  TE 1: Blocking FDP, FDS, isolated FDS glide, hook, wave, fist, digit extension and ab/ad x 10 reps each +adduction foam squeeze and RB for finger spreads  TE 2: 4# green clothespin with pom poms for pinch strength  TE 3: JAKUB varigrip red 50% resistance for index press x 20 reps  TE 4: pink putty for gross , key, 2 and 3 pt pinch x 10 reps ; raking, reverse raking x 10 reps each  Manual Therapy  MT 1: Massage to DIP collateral ligaments, ORL and EI with  IASTYM tool to increase blood flow, circulation and to promote healing.  MT 2: ice massage following tx session  Modalities  Paraffin Bath: Paraffin bath x 10 min to  hand to increase blood flow, circulation and tissue elasticity prior to therex.    Time Entry(in minutes):  Paraffin Bath Time Entry: 10  Manual Therapy Time Entry: 15  Therapeutic Exercise Time Entry: 20    Assessment & Plan   Assessment: No pain reported this date; minimal tightness noted in  ORL with DIP flexion. Able to make near full fist. Will progress with pinch and ROM.  Evaluation/Treatment Tolerance: Patient tolerated treatment well    Patient will continue to benefit from skilled outpatient occupational therapy to address the deficits listed in the problem list box on initial evaluation, provide pt/family education and to maximize pt's level of independence in the home and community environment.     Patient's spiritual, cultural, and educational needs considered and patient agreeable to plan of care and goals.           Plan: Cont OT 1x week for ROM, edema and pain control    Goals:   Active       LTGs       1. Increase pinch strength 1-4 psi's for writing, typing, texting and FM activities  (Progressing)       Start:  04/10/25    Expected End:  05/22/25            2.  Patient to return to work/leisure activities with pain of 3 or less  (Met)       Start:  04/10/25    Expected End:  05/22/25    Resolved:  04/28/25            STGs       1. Patient to be IND with HEP & modalities for pain management   (Met)       Start:  04/10/25    Expected End:  05/01/25    Resolved:  04/28/25         2. Increase ROM 3-10 degrees to increase functional hand use for ADLs/work/leisure activities  (Progressing)       Start:  04/10/25    Expected End:  05/01/25            3. Decrease edema .1-.5mm to increase joint mobility/ flexibility for  functional hand use  (Progressing)       Start:  04/10/25    Expected End:  05/01/25            4. Assess  pinch strength and update goals accordingly  (Met)       Start:  04/10/25    Expected End:  05/01/25    Resolved:  04/23/25             Carolina Acosta, OT, CHT

## 2025-05-06 ENCOUNTER — CLINICAL SUPPORT (OUTPATIENT)
Dept: REHABILITATION | Facility: HOSPITAL | Age: 45
End: 2025-05-06
Payer: COMMERCIAL

## 2025-05-06 DIAGNOSIS — M79.89 SWELLING OF FINGER, RIGHT: ICD-10-CM

## 2025-05-06 DIAGNOSIS — R29.898 DECREASED PINCH STRENGTH: ICD-10-CM

## 2025-05-06 DIAGNOSIS — M79.644 FINGER PAIN, RIGHT: Primary | ICD-10-CM

## 2025-05-06 PROCEDURE — 97018 PARAFFIN BATH THERAPY: CPT

## 2025-05-06 PROCEDURE — 97110 THERAPEUTIC EXERCISES: CPT

## 2025-05-06 PROCEDURE — 97140 MANUAL THERAPY 1/> REGIONS: CPT

## 2025-05-12 NOTE — PROGRESS NOTES
Outpatient Rehab    Occupational Therapy Visit    Patient Name: Yanna Kwong  MRN: [id number]  YOB: 1980  Encounter Date: 5/6/2025    Therapy Diagnosis:   Encounter Diagnoses   Name Primary?    Finger pain, right Yes    Decreased pinch strength     Swelling of finger, right      Physician: Allen Yanez PA-C    Physician Orders: Eval and Treat  Medical Diagnosis: Sprain of interphalangeal joint of right index finger, initial encounter    Visit # / Visits Authorized: 4 / 20  Insurance Authorization Period: 4/14/2025 to 9/26/2026  Date of Evaluation: 4/7/2025 4/14/2025  Plan of Care Certification:       Time In: 1515   Time Out: 1600  Total Time (in minutes): 45   Total Billable Time (in minutes):  45    FOTO: N/a  Intake Score:  %  Survey Score 2:  %  Survey Score 3:  %    Precautions:       Subjective   No pain, just can't make a tight fist, feels loose.  Tender on top today (DIP crease, ORL ligament).  Pain reported as 0/10.      Objective                    Treatment:  Therapeutic Exercise  TE 1: Blocking FDP, FDS, isolated FDS glide, hook, wave, fist, digit extension and ab/ad x 10 reps each +adduction foam squeeze and RB for finger spreads  TE 2: 4# green clothespin with pom poms for pinch strength  TE 3: JAKUB varigrip red 75% resistance for index press x 20 reps  TE 4: pink putty for gross , key, 2 and 3 pt pinch x 10 reps ; raking, reverse raking x 10 reps each; add putty tool #3 for opening water bottle  TE 5: red digi flex for isolated/composite finger flexion x 10 reps each  Manual Therapy  MT 1: Massage to DIP collateral ligaments, ORL and EI with  IASTYM tool to increase blood flow, circulation and to promote healing.  MT 2: ice massage following tx session  Modalities  Paraffin Bath: Paraffin bath x 10 min to  hand to increase blood flow, circulation and tissue elasticity prior to therex.    Time Entry(in minutes):  Paraffin Bath Time Entry: 10  Manual Therapy Time Entry:  15  Therapeutic Exercise Time Entry: 20    Assessment & Plan   Assessment: No pain reported this date; minimal tightness noted in ORL with DIP flexion. Able to make near full fist. Will progress with pinch and ROM.  Evaluation/Treatment Tolerance: Patient tolerated treatment well    Patient will continue to benefit from skilled outpatient occupational therapy to address the deficits listed in the problem list box on initial evaluation, provide pt/family education and to maximize pt's level of independence in the home and community environment.     Patient's spiritual, cultural, and educational needs considered and patient agreeable to plan of care and goals.           Plan: Cont OT 1x week for ROM, edema and pain control    Goals:   Active       LTGs       1. Increase pinch strength 1-4 psi's for writing, typing, texting and FM activities  (Progressing)       Start:  04/10/25    Expected End:  05/22/25            2.  Patient to return to work/leisure activities with pain of 3 or less  (Met)       Start:  04/10/25    Expected End:  05/22/25    Resolved:  04/28/25            STGs       1. Patient to be IND with HEP & modalities for pain management   (Met)       Start:  04/10/25    Expected End:  05/01/25    Resolved:  04/28/25         2. Increase ROM 3-10 degrees to increase functional hand use for ADLs/work/leisure activities  (Progressing)       Start:  04/10/25    Expected End:  05/01/25            3. Decrease edema .1-.5mm to increase joint mobility/ flexibility for  functional hand use  (Progressing)       Start:  04/10/25    Expected End:  05/01/25            4. Assess  pinch strength and update goals accordingly  (Met)       Start:  04/10/25    Expected End:  05/01/25    Resolved:  04/23/25             Carolina Acosta, OT, CHT

## 2025-05-13 ENCOUNTER — CLINICAL SUPPORT (OUTPATIENT)
Dept: REHABILITATION | Facility: HOSPITAL | Age: 45
End: 2025-05-13
Payer: COMMERCIAL

## 2025-05-13 DIAGNOSIS — M79.644 FINGER PAIN, RIGHT: Primary | ICD-10-CM

## 2025-05-13 DIAGNOSIS — M79.89 SWELLING OF FINGER, RIGHT: ICD-10-CM

## 2025-05-13 DIAGNOSIS — R29.898 DECREASED PINCH STRENGTH: ICD-10-CM

## 2025-05-13 PROCEDURE — 97018 PARAFFIN BATH THERAPY: CPT

## 2025-05-13 PROCEDURE — 97110 THERAPEUTIC EXERCISES: CPT

## 2025-05-13 PROCEDURE — 97140 MANUAL THERAPY 1/> REGIONS: CPT

## 2025-05-13 NOTE — PROGRESS NOTES
Outpatient Rehab    Occupational Therapy Visit    Patient Name: Yanna Kwong  MRN: [id number]  YOB: 1980  Encounter Date: 5/13/2025    Therapy Diagnosis:   Encounter Diagnoses   Name Primary?    Finger pain, right Yes    Decreased pinch strength     Swelling of finger, right      Physician: Allen Yanez PA-C    Physician Orders: Eval and Treat  Medical Diagnosis: Sprain of interphalangeal joint of right index finger, initial encounter    Visit # / Visits Authorized: 5 / 20  Insurance Authorization Period: 4/14/2025 to 9/26/2026  Date of Evaluation: 4/7/2025 4/14/2025  Plan of Care Certification: 4/10/2025 to 5/22/2025      Time In: 0730   Time Out: 0815  Total Time (in minutes): 45   Total Billable Time (in minutes):  45    FOTO: N/A  Intake Score:  %  Survey Score 2:  %  Survey Score 3:  %    Precautions:       Subjective   No pain,  across the tip (Dorsal DIP crease) when I make a fist.  Pain reported as 0/10.      Objective            Treatment:  Therapeutic Exercise  TE 1: Blocking FDP, FDS, isolated FDS glide, hook, wave, fist, digit extension and ab/ad x 10 reps each +adduction foam squeeze and RB for finger spreads  TE 2: 4# green clothespin with pom poms for pinch strength  TE 3: JAKUB varigrip red 75% resistance for index press x 20 reps  TE 4: pink putty for gross , key, 2 and 3 pt pinch x 10 reps ; raking, reverse raking x 10 reps each; add putty tool #3 for opening water bottle  TE 5: red digi flex for isolated/composite finger flexion x 10 reps each  TE 6: Added theraband black w/slit to stretch composite IP flexion x 3 min; provided for home use  Manual Therapy  MT 1: Massage to DIP collateral ligaments, ORL and EI with  IASTYM tool to increase blood flow, circulation and to promote healing.  MT 2: ice massage following tx session  Modalities  Paraffin Bath: Paraffin bath x 10 min to  hand to increase blood flow, circulation and tissue elasticity prior to therex.    Time  Entry(in minutes):  Paraffin Bath Time Entry: 10  Manual Therapy Time Entry: 15  Therapeutic Exercise Time Entry: 20    Assessment & Plan   Assessment: No pain reported this date; minimal tightness noted in ORL with DIP flexion. Able to make near full fist. Will progress with pinch and ROM.  Evaluation/Treatment Tolerance: Patient tolerated treatment well    Patient will continue to benefit from skilled outpatient occupational therapy to address the deficits listed in the problem list box on initial evaluation, provide pt/family education and to maximize pt's level of independence in the home and community environment.     Patient's spiritual, cultural, and educational needs considered and patient agreeable to plan of care and goals.           Plan: Cont OT 1x week for ROM, edema and pain control    Goals:   Active       LTGs       1. Increase pinch strength 1-4 psi's for writing, typing, texting and FM activities  (Progressing)       Start:  04/10/25    Expected End:  05/22/25            2.  Patient to return to work/leisure activities with pain of 3 or less  (Met)       Start:  04/10/25    Expected End:  05/22/25    Resolved:  04/28/25            STGs       1. Patient to be IND with HEP & modalities for pain management   (Met)       Start:  04/10/25    Expected End:  05/01/25    Resolved:  04/28/25         2. Increase ROM 3-10 degrees to increase functional hand use for ADLs/work/leisure activities  (Progressing)       Start:  04/10/25    Expected End:  05/01/25            3. Decrease edema .1-.5mm to increase joint mobility/ flexibility for  functional hand use  (Progressing)       Start:  04/10/25    Expected End:  05/01/25            4. Assess  pinch strength and update goals accordingly  (Met)       Start:  04/10/25    Expected End:  05/01/25    Resolved:  04/23/25             Carolina Acosta, OT, CHT

## 2025-05-22 ENCOUNTER — CLINICAL SUPPORT (OUTPATIENT)
Dept: REHABILITATION | Facility: HOSPITAL | Age: 45
End: 2025-05-22
Payer: COMMERCIAL

## 2025-05-22 DIAGNOSIS — M79.644 FINGER PAIN, RIGHT: Primary | ICD-10-CM

## 2025-05-22 DIAGNOSIS — M79.89 SWELLING OF FINGER, RIGHT: ICD-10-CM

## 2025-05-22 DIAGNOSIS — R29.898 DECREASED PINCH STRENGTH: ICD-10-CM

## 2025-05-22 PROCEDURE — 97018 PARAFFIN BATH THERAPY: CPT

## 2025-05-22 PROCEDURE — 97140 MANUAL THERAPY 1/> REGIONS: CPT

## 2025-05-22 PROCEDURE — 97110 THERAPEUTIC EXERCISES: CPT

## 2025-05-22 NOTE — PROGRESS NOTES
Outpatient Rehab    Occupational Therapy Visit    Patient Name: Yanna Kwong  MRN: [id number]  YOB: 1980  Encounter Date: 5/22/2025    Therapy Diagnosis:   Encounter Diagnoses   Name Primary?    Finger pain, right Yes    Decreased pinch strength     Swelling of finger, right      Physician: Allen Yanez PA-C    Physician Orders: Eval and Treat  Medical Diagnosis: Sprain of interphalangeal joint of right index finger, initial encounter    Visit # / Visits Authorized: 6 / 20  Insurance Authorization Period: 4/14/2025 to 9/26/2026  Date of Evaluation: 4/7/2025 4/14/2025  Plan of Care Certification: 4/10/2025 to 5/22/2025      Time In: 1100   Time Out: 1145  Total Time (in minutes): 45   Total Billable Time (in minutes):  45    FOTO: N/a  Intake Score:  %  Survey Score 2:  %  Survey Score 3:  %    Precautions:       Subjective   No pain, doing well,.  Pain reported as 0/10.      Objective            Treatment:  Therapeutic Exercise  TE 1: Blocking FDP, FDS, isolated FDS glide, hook, wave, fist, digit extension and ab/ad x 10 reps each +adduction foam squeeze and RB for finger spreads  TE 2: 4# green clothespin with pom poms for pinch strength  TE 3: JAKUB varigrip red 75% resistance for index press x 20 reps  TE 4: pink putty for gross , key, 2 and 3 pt pinch x 10 reps ; raking, reverse raking x 10 reps each; add putty tool #3 for opening water bottle  TE 5: red digi flex for isolated/composite finger flexion x 10 reps each  Modalities  Paraffin Bath: Paraffin bath x 10 min to  hand to increase blood flow, circulation and tissue elasticity prior to therex.    Time Entry(in minutes):  Paraffin Bath Time Entry: 10  Manual Therapy Time Entry: 15  Therapeutic Exercise Time Entry: 20    Assessment & Plan   Assessment: No pain reported this date; minimal tightness noted in ORL with DIP flexion. Able to make near full fist. Will progress with pinch and ROM.  Evaluation/Treatment Tolerance: Patient tolerated  treatment well    The patient will continue to benefit from skilled outpatient occupational therapy in order to address the deficits listed in the problem list on the initial evaluation, provide patient and family education, and maximize the patients level of independence in the home and community environments.     The patient's spiritual, cultural, and educational needs were considered, and the patient is agreeable to the plan of care and goals.           Plan: Cont OT 1x week for ROM, edema and pain control    Goals:   Active       LTGs       1. Increase pinch strength 1-4 psi's for writing, typing, texting and FM activities  (Progressing)       Start:  04/10/25    Expected End:  05/22/25            2.  Patient to return to work/leisure activities with pain of 3 or less  (Met)       Start:  04/10/25    Expected End:  05/22/25    Resolved:  04/28/25            STGs       1. Patient to be IND with HEP & modalities for pain management   (Met)       Start:  04/10/25    Expected End:  05/01/25    Resolved:  04/28/25         2. Increase ROM 3-10 degrees to increase functional hand use for ADLs/work/leisure activities  (Progressing)       Start:  04/10/25    Expected End:  05/01/25            3. Decrease edema .1-.5mm to increase joint mobility/ flexibility for  functional hand use  (Progressing)       Start:  04/10/25    Expected End:  05/01/25            4. Assess  pinch strength and update goals accordingly  (Met)       Start:  04/10/25    Expected End:  05/01/25    Resolved:  04/23/25             Carolina Acosta OT, CHT

## 2025-05-28 ENCOUNTER — CLINICAL SUPPORT (OUTPATIENT)
Dept: REHABILITATION | Facility: HOSPITAL | Age: 45
End: 2025-05-28
Payer: COMMERCIAL

## 2025-05-28 DIAGNOSIS — M79.89 SWELLING OF FINGER, RIGHT: ICD-10-CM

## 2025-05-28 DIAGNOSIS — M79.644 FINGER PAIN, RIGHT: Primary | ICD-10-CM

## 2025-05-28 DIAGNOSIS — R29.898 DECREASED PINCH STRENGTH: ICD-10-CM

## 2025-05-28 PROCEDURE — 97110 THERAPEUTIC EXERCISES: CPT

## 2025-05-28 PROCEDURE — 97018 PARAFFIN BATH THERAPY: CPT

## 2025-05-28 PROCEDURE — 97140 MANUAL THERAPY 1/> REGIONS: CPT

## 2025-05-29 NOTE — PROGRESS NOTES
Outpatient Rehab    Occupational Therapy Discharge    Patient Name: Yanna Kwong  MRN: [id number]  YOB: 1980  Encounter Date: 5/28/2025    Therapy Diagnosis:   Encounter Diagnoses   Name Primary?    Finger pain, right Yes    Decreased pinch strength     Swelling of finger, right      Physician: Allen Yanez PA-C    Physician Orders: Eval and Treat  Medical Diagnosis: Sprain of interphalangeal joint of right index finger, initial encounter    Visit # / Visits Authorized: 7 / 20  Insurance Authorization Period: 4/14/2025 to 9/26/2026  Date of Evaluation: 4/7/2025 4/14/2025  Plan of Care Certification: 4/10/2025 to 5/22/2025      Time In: 1415   Time Out: 1500  Total Time (in minutes): 45   Total Billable Time (in minutes):  45    FOTO: n/a   Intake Score:  %  Survey Score 2:  %  Survey Score 3:  %    Precautions:       Subjective   I think its doing ok, just little tender around the tip, otherwise doing fine.  Pain reported as 0/10.      Objective      Right Hand Digit Swelling   Proximal Phalanx PIP Middle Phalanx DIP Distal Phalanx   Index 5.8 cm (-0.1) 5.5 cm (-0.3) 4.9 cm (-0.4) 4.6 cm (-0.1) 4.1 cm (-0.4)      Digit 2 - Index Finger Active Range of Motion  Right Index Finger   Extension (deg) Flexion (deg) Pain   MCP 0 90     PIP 0 119     DIP 0 80     SALVADOR: 289 (+54)     Right Pinch Strength   Trial 1 (lbs) Trial 2 (lbs) Trial 3 (lbs) Average (lbs) Pain   Lateral (Key Pinch) 15 15 15 15 (+4)     Three Point (Three Jaw Mario) 10 10 10 10 (-1)     Two Point (Tip to Tip) 9 9 9 9(+1)            Treatment:  Therapeutic Exercise  TE 1: Blocking FDP, FDS, isolated FDS glide, hook, wave, fist, digit extension and ab/ad x 10 reps each +adduction foam squeeze and RB for finger spreads  TE 2: 4# green clothespin with pom poms for pinch strength  TE 3: JAKUB varigrip red 75% resistance for index press x 20 reps  TE 4: pink putty for gross , key, 2 and 3 pt pinch x 10 reps ; raking, reverse raking x 10  reps each; add putty tool #3 for opening water bottle  TE 5: red digi flex for isolated/composite finger flexion x 10 reps each  Manual Therapy  MT 1: Massage to DIP collateral ligaments, ORL and EI with  IASTYM tool to increase blood flow, circulation and to promote healing.  MT 2: ice massage following tx session  Modalities  Paraffin Bath: Paraffin bath x 10 min to  hand to increase blood flow, circulation and tissue elasticity prior to therex.    Time Entry(in minutes):  Paraffin Bath Time Entry: 10  Manual Therapy Time Entry: 15  Therapeutic Exercise Time Entry: 20    Assessment & Plan   Assessment: Increased ROM noted, decreased pain, decreased swelling and improved pinch strength noted.  Patient in agreement with discharge at this time with all goals met.       The patient's spiritual, cultural, and educational needs were considered, and the patient is agreeable to the plan of care and goals.           Plan: Will discharge at this time with all goals met and patient in agreement    Goals:   Resolved       LTGs       1. Increase pinch strength 1-4 psi's for writing, typing, texting and FM activities  (Met)       Start:  04/10/25    Expected End:  05/22/25    Resolved:  05/29/25         2.  Patient to return to work/leisure activities with pain of 3 or less  (Met)       Start:  04/10/25    Expected End:  05/22/25    Resolved:  04/28/25            STGs       1. Patient to be IND with HEP & modalities for pain management   (Met)       Start:  04/10/25    Expected End:  05/01/25    Resolved:  04/28/25         2. Increase ROM 3-10 degrees to increase functional hand use for ADLs/work/leisure activities  (Met)       Start:  04/10/25    Expected End:  05/01/25    Resolved:  05/29/25         3. Decrease edema .1-.5mm to increase joint mobility/ flexibility for  functional hand use  (Met)       Start:  04/10/25    Expected End:  05/01/25    Resolved:  05/29/25         4. Assess  pinch strength and update goals  accordingly  (Met)       Start:  04/10/25    Expected End:  05/01/25    Resolved:  04/23/25             Carolina Acosta OT, CHT